# Patient Record
Sex: MALE | Race: BLACK OR AFRICAN AMERICAN | NOT HISPANIC OR LATINO
[De-identification: names, ages, dates, MRNs, and addresses within clinical notes are randomized per-mention and may not be internally consistent; named-entity substitution may affect disease eponyms.]

---

## 2017-07-14 ENCOUNTER — FORM ENCOUNTER (OUTPATIENT)
Age: 82
End: 2017-07-14

## 2017-07-15 ENCOUNTER — OUTPATIENT (OUTPATIENT)
Dept: OUTPATIENT SERVICES | Facility: HOSPITAL | Age: 82
LOS: 1 days | End: 2017-07-15
Payer: MEDICARE

## 2017-07-15 PROCEDURE — 70544 MR ANGIOGRAPHY HEAD W/O DYE: CPT

## 2017-07-15 PROCEDURE — 70544 MR ANGIOGRAPHY HEAD W/O DYE: CPT | Mod: 26

## 2018-10-07 ENCOUNTER — FORM ENCOUNTER (OUTPATIENT)
Age: 83
End: 2018-10-07

## 2018-10-08 ENCOUNTER — APPOINTMENT (OUTPATIENT)
Dept: MRI IMAGING | Facility: HOSPITAL | Age: 83
End: 2018-10-08
Payer: MEDICARE

## 2018-10-08 ENCOUNTER — OUTPATIENT (OUTPATIENT)
Dept: OUTPATIENT SERVICES | Facility: HOSPITAL | Age: 83
LOS: 1 days | End: 2018-10-08
Payer: MEDICARE

## 2018-10-08 PROCEDURE — 70544 MR ANGIOGRAPHY HEAD W/O DYE: CPT | Mod: 26

## 2018-10-08 PROCEDURE — 70544 MR ANGIOGRAPHY HEAD W/O DYE: CPT

## 2019-10-14 ENCOUNTER — FORM ENCOUNTER (OUTPATIENT)
Age: 84
End: 2019-10-14

## 2019-10-15 ENCOUNTER — APPOINTMENT (OUTPATIENT)
Dept: MRI IMAGING | Facility: HOSPITAL | Age: 84
End: 2019-10-15
Payer: MEDICARE

## 2019-10-15 ENCOUNTER — OUTPATIENT (OUTPATIENT)
Dept: OUTPATIENT SERVICES | Facility: HOSPITAL | Age: 84
LOS: 1 days | End: 2019-10-15
Payer: MEDICARE

## 2019-10-15 PROCEDURE — A9579: CPT

## 2019-10-15 PROCEDURE — 70546 MR ANGIOGRAPH HEAD W/O&W/DYE: CPT | Mod: 26

## 2019-10-15 PROCEDURE — A9585: CPT

## 2019-10-15 PROCEDURE — 70546 MR ANGIOGRAPH HEAD W/O&W/DYE: CPT

## 2020-05-05 ENCOUNTER — TRANSCRIPTION ENCOUNTER (OUTPATIENT)
Age: 85
End: 2020-05-05

## 2020-09-28 ENCOUNTER — APPOINTMENT (OUTPATIENT)
Dept: MRI IMAGING | Facility: HOSPITAL | Age: 85
End: 2020-09-28
Payer: MEDICARE

## 2020-09-28 ENCOUNTER — OUTPATIENT (OUTPATIENT)
Dept: OUTPATIENT SERVICES | Facility: HOSPITAL | Age: 85
LOS: 1 days | End: 2020-09-28
Payer: MEDICARE

## 2020-09-28 PROCEDURE — 70551 MRI BRAIN STEM W/O DYE: CPT

## 2020-09-28 PROCEDURE — 70551 MRI BRAIN STEM W/O DYE: CPT | Mod: 26

## 2020-10-15 DIAGNOSIS — D35.2 BENIGN NEOPLASM OF PITUITARY GLAND: ICD-10-CM

## 2020-10-27 ENCOUNTER — APPOINTMENT (OUTPATIENT)
Dept: ENDOCRINOLOGY | Facility: CLINIC | Age: 85
End: 2020-10-27
Payer: MEDICARE

## 2020-10-27 VITALS
HEIGHT: 70 IN | SYSTOLIC BLOOD PRESSURE: 192 MMHG | HEART RATE: 72 BPM | BODY MASS INDEX: 28.77 KG/M2 | WEIGHT: 201 LBS | DIASTOLIC BLOOD PRESSURE: 91 MMHG

## 2020-10-27 PROCEDURE — 99203 OFFICE O/P NEW LOW 30 MIN: CPT | Mod: 25

## 2020-10-28 RX ORDER — LOSARTAN POTASSIUM 100 MG/1
100 TABLET, FILM COATED ORAL
Qty: 90 | Refills: 0 | Status: ACTIVE | COMMUNITY
Start: 2020-09-22
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
## 2020-10-28 NOTE — REASON FOR VISIT
[Initial Evaluation] : an initial evaluation [Pituitary Evaluation/ Disorder] : pituitary evaluation/disorder [FreeTextEntry2] : Dr Alfaro

## 2020-10-28 NOTE — ASSESSMENT
[FreeTextEntry1] : Possible pituitary adenoma seen on MRI\par will check pituitary hormones.  symptomatically may have low testosterone but given his age would likely not replace testosterone unless it is very low (< 200).  Though if low testosterone is due to hyperprolactinemia, it will likely normalize with dopamine agonist.  Since timing of symptoms is coincident with bleeding episode, low T could be stress related but then also should recover once stress has resolved.\par If hormones are normal, then no further treatment or workup necessary.\par

## 2020-10-28 NOTE — PHYSICAL EXAM
[Alert] : alert [Healthy Appearance] : healthy appearance [No Proptosis] : no proptosis [No Lid Lag] : no lid lag [Normal Hearing] : hearing was normal [No LAD] : no lymphadenopathy [Thyroid Not Enlarged] : the thyroid was not enlarged [Clear to Auscultation] : lungs were clear to auscultation bilaterally [Normal S1, S2] : normal S1 and S2 [Regular Rhythm] : with a regular rhythm [Normal Affect] : the affect was normal [Normal Mood] : the mood was normal [Acanthosis Nigricans] : no acanthosis nigricans [de-identified] : looks younger than stated age [de-identified] : no acromegaly or Cushings features

## 2020-10-28 NOTE — DATA REVIEWED
[FreeTextEntry1] : MRI, 9/20:\par approx 3mm focus of hyperintensity medial for R cav segment of ICA -- may represent thrombosed aneurysm or R sided 3mm pit adenoma\par mild microvascular disease \par 3.5mm outpouching of supraclinoid L ICA No

## 2020-10-30 LAB
CORTIS SERPL-MCNC: 10 UG/DL
FSH SERPL-MCNC: 44.5 IU/L
LH SERPL-ACNC: 21.3 IU/L
PROLACTIN SERPL-MCNC: 14.7 NG/ML
T4 FREE SERPL-MCNC: 1.2 NG/DL
T4 SERPL-MCNC: 7 UG/DL
TESTOST SERPL-MCNC: 332 NG/DL
TSH SERPL-ACNC: 1.9 UIU/ML

## 2020-11-04 LAB
ALBUMIN SERPL ELPH-MCNC: 4.2 G/DL
ALP BLD-CCNC: 59 U/L
ALT SERPL-CCNC: 20 U/L
ANION GAP SERPL CALC-SCNC: 11 MMOL/L
AST SERPL-CCNC: 22 U/L
BILIRUB SERPL-MCNC: 0.7 MG/DL
BUN SERPL-MCNC: 21 MG/DL
CALCIUM SERPL-MCNC: 9.6 MG/DL
CHLORIDE SERPL-SCNC: 108 MMOL/L
CO2 SERPL-SCNC: 26 MMOL/L
CREAT SERPL-MCNC: 1.33 MG/DL
GLUCOSE SERPL-MCNC: 96 MG/DL
IGF-1 INTERP: NORMAL
IGF-I BLD-MCNC: 68 NG/ML
POTASSIUM SERPL-SCNC: 4.2 MMOL/L
PROT SERPL-MCNC: 7.2 G/DL
SODIUM SERPL-SCNC: 145 MMOL/L

## 2021-09-28 ENCOUNTER — OUTPATIENT (OUTPATIENT)
Dept: OUTPATIENT SERVICES | Facility: HOSPITAL | Age: 86
LOS: 1 days | End: 2021-09-28
Payer: MEDICARE

## 2021-09-28 ENCOUNTER — APPOINTMENT (OUTPATIENT)
Dept: MRI IMAGING | Facility: HOSPITAL | Age: 86
End: 2021-09-28
Payer: MEDICARE

## 2021-09-28 PROCEDURE — 70544 MR ANGIOGRAPHY HEAD W/O DYE: CPT | Mod: 26,MH

## 2021-09-28 PROCEDURE — 70544 MR ANGIOGRAPHY HEAD W/O DYE: CPT

## 2021-10-14 ENCOUNTER — TRANSCRIPTION ENCOUNTER (OUTPATIENT)
Age: 86
End: 2021-10-14

## 2021-10-15 ENCOUNTER — TRANSCRIPTION ENCOUNTER (OUTPATIENT)
Age: 86
End: 2021-10-15

## 2021-10-24 ENCOUNTER — NON-APPOINTMENT (OUTPATIENT)
Age: 86
End: 2021-10-24

## 2021-10-28 ENCOUNTER — APPOINTMENT (OUTPATIENT)
Dept: ENDOCRINOLOGY | Facility: CLINIC | Age: 86
End: 2021-10-28
Payer: MEDICARE

## 2021-10-28 VITALS
WEIGHT: 201 LBS | HEIGHT: 70 IN | DIASTOLIC BLOOD PRESSURE: 65 MMHG | HEART RATE: 66 BPM | SYSTOLIC BLOOD PRESSURE: 153 MMHG | BODY MASS INDEX: 28.77 KG/M2

## 2021-10-28 PROCEDURE — 99213 OFFICE O/P EST LOW 20 MIN: CPT

## 2021-10-28 NOTE — HISTORY OF PRESENT ILLNESS
[FreeTextEntry1] : no health issues since last visit\par energy is good.  appetite is reduced but he is eating\par no nausea, dizziness\par no falls or problems with balance\par walks dog twice/day, 30-45 minutes\par no headache or vision changes\par weight is the same as last year\par cleans own house, lives with wife of 66 years\par sleep is ok, but interrupted.\par getting injections from primary every 6 weeks ( ? testosterone).  Says PCP is checking labs also; labs done last week, has follow up next week to go over results.\par got flu vaccine, waiting for Moderna booster\par MRI reports from 9/21 reviewed: stable 3mm pit adenoma\par \par Meds:\par metformin 500mg/day for pre diabetes \par amlodipine 10mg, atenolol 50mg, losartan 100mg\par folic acid, iron\par Cialis 5mg for BPH

## 2021-10-28 NOTE — ASSESSMENT
[FreeTextEntry1] : PItuitary adenoma, non functional\par Hypogonadism, primary (high LH, FSH).  on T replacement.\par PT can follow with PCP and return to me if labs become abnormal, or if new symptoms develop (headache, vision changes, significant weight changes, uncontrolled BP or glucose)\par RTO prn

## 2021-10-28 NOTE — DATA REVIEWED
[FreeTextEntry1] : 10/20: IGF1 68, prolactin 14.7, tot T 332, LH 21.3, FSH 44.5, pm cortisol 10, free T4 1.2, TSH 1.90\par \par \par MRI, 9/21: \par 3mm rounded focus on R side of pituitary c/w cystic or proteinaceous pit adenoma\par \par MRI, 9/20:\par approx 3mm focus of hyperintensity medial for R cav segment of ICA -- may represent thrombosed aneurysm or R sided 3mm pit adenoma\par mild microvascular disease \par 3.5mm outpouching of supraclinoid L ICA

## 2021-10-28 NOTE — PHYSICAL EXAM
[Alert] : alert [Healthy Appearance] : healthy appearance [No Proptosis] : no proptosis [No Lid Lag] : no lid lag [Normal Hearing] : hearing was normal [No LAD] : no lymphadenopathy [Thyroid Not Enlarged] : the thyroid was not enlarged [Clear to Auscultation] : lungs were clear to auscultation bilaterally [Normal S1, S2] : normal S1 and S2 [Regular Rhythm] : with a regular rhythm [Acanthosis Nigricans] : no acanthosis nigricans [Normal Affect] : the affect was normal [Normal Mood] : the mood was normal [de-identified] : looks younger than stated age [de-identified] : no acromegaly or Cushings features

## 2022-01-01 ENCOUNTER — OUTPATIENT (OUTPATIENT)
Dept: OUTPATIENT SERVICES | Facility: HOSPITAL | Age: 87
LOS: 1 days | End: 2022-01-01
Payer: MEDICARE

## 2022-01-01 ENCOUNTER — APPOINTMENT (OUTPATIENT)
Dept: MRI IMAGING | Facility: HOSPITAL | Age: 87
End: 2022-01-01

## 2022-01-01 PROCEDURE — 70544 MR ANGIOGRAPHY HEAD W/O DYE: CPT

## 2022-01-01 PROCEDURE — 70544 MR ANGIOGRAPHY HEAD W/O DYE: CPT | Mod: 26,MH

## 2023-01-01 ENCOUNTER — NON-APPOINTMENT (OUTPATIENT)
Age: 88
End: 2023-01-01

## 2023-01-01 ENCOUNTER — APPOINTMENT (OUTPATIENT)
Dept: OTOLARYNGOLOGY | Facility: CLINIC | Age: 88
End: 2023-01-01

## 2023-01-01 ENCOUNTER — RESULT REVIEW (OUTPATIENT)
Age: 88
End: 2023-01-01

## 2023-01-01 ENCOUNTER — APPOINTMENT (OUTPATIENT)
Dept: HEMATOLOGY ONCOLOGY | Facility: CLINIC | Age: 88
End: 2023-01-01

## 2023-01-01 ENCOUNTER — TRANSCRIPTION ENCOUNTER (OUTPATIENT)
Age: 88
End: 2023-01-01

## 2023-01-01 ENCOUNTER — APPOINTMENT (OUTPATIENT)
Dept: INTERVENTIONAL RADIOLOGY/VASCULAR | Facility: HOSPITAL | Age: 88
End: 2023-01-01

## 2023-01-01 ENCOUNTER — OUTPATIENT (OUTPATIENT)
Dept: OUTPATIENT SERVICES | Facility: HOSPITAL | Age: 88
LOS: 1 days | End: 2023-01-01
Payer: MEDICARE

## 2023-01-01 ENCOUNTER — INPATIENT (INPATIENT)
Facility: HOSPITAL | Age: 88
LOS: 0 days | DRG: 951 | End: 2023-09-01
Attending: GENERAL ACUTE CARE HOSPITAL | Admitting: INTERNAL MEDICINE
Payer: MEDICARE

## 2023-01-01 ENCOUNTER — APPOINTMENT (OUTPATIENT)
Dept: HEMATOLOGY ONCOLOGY | Facility: CLINIC | Age: 88
End: 2023-01-01
Payer: MEDICARE

## 2023-01-01 ENCOUNTER — APPOINTMENT (OUTPATIENT)
Dept: OTOLARYNGOLOGY | Facility: CLINIC | Age: 88
End: 2023-01-01
Payer: MEDICARE

## 2023-01-01 ENCOUNTER — APPOINTMENT (OUTPATIENT)
Dept: RADIATION ONCOLOGY | Facility: CLINIC | Age: 88
End: 2023-01-01

## 2023-01-01 ENCOUNTER — APPOINTMENT (OUTPATIENT)
Dept: CT IMAGING | Facility: HOSPITAL | Age: 88
End: 2023-01-01

## 2023-01-01 ENCOUNTER — INPATIENT (INPATIENT)
Facility: HOSPITAL | Age: 88
LOS: 7 days | Discharge: HOSPICE | DRG: 326 | End: 2023-08-25
Attending: GENERAL ACUTE CARE HOSPITAL | Admitting: INTERNAL MEDICINE
Payer: MEDICARE

## 2023-01-01 ENCOUNTER — EMERGENCY (EMERGENCY)
Facility: HOSPITAL | Age: 88
LOS: 1 days | Discharge: HOME | End: 2023-01-01
Attending: EMERGENCY MEDICINE | Admitting: EMERGENCY MEDICINE
Payer: MEDICARE

## 2023-01-01 VITALS
HEART RATE: 69 BPM | SYSTOLIC BLOOD PRESSURE: 178 MMHG | DIASTOLIC BLOOD PRESSURE: 77 MMHG | RESPIRATION RATE: 18 BRPM | WEIGHT: 169.98 LBS

## 2023-01-01 VITALS
HEIGHT: 70 IN | TEMPERATURE: 98.4 F | WEIGHT: 185 LBS | OXYGEN SATURATION: 99 % | BODY MASS INDEX: 26.48 KG/M2 | DIASTOLIC BLOOD PRESSURE: 74 MMHG | HEART RATE: 61 BPM | SYSTOLIC BLOOD PRESSURE: 158 MMHG

## 2023-01-01 VITALS
DIASTOLIC BLOOD PRESSURE: 66 MMHG | HEART RATE: 71 BPM | BODY MASS INDEX: 24.77 KG/M2 | SYSTOLIC BLOOD PRESSURE: 181 MMHG | TEMPERATURE: 98 F | HEIGHT: 70 IN | OXYGEN SATURATION: 97 % | WEIGHT: 173 LBS | RESPIRATION RATE: 16 BRPM

## 2023-01-01 VITALS
WEIGHT: 173.94 LBS | TEMPERATURE: 98 F | HEIGHT: 66 IN | RESPIRATION RATE: 27 BRPM | HEART RATE: 81 BPM | DIASTOLIC BLOOD PRESSURE: 80 MMHG | OXYGEN SATURATION: 90 % | SYSTOLIC BLOOD PRESSURE: 173 MMHG

## 2023-01-01 VITALS
HEART RATE: 67 BPM | RESPIRATION RATE: 18 BRPM | OXYGEN SATURATION: 97 % | SYSTOLIC BLOOD PRESSURE: 170 MMHG | TEMPERATURE: 98 F | DIASTOLIC BLOOD PRESSURE: 80 MMHG

## 2023-01-01 VITALS
DIASTOLIC BLOOD PRESSURE: 73 MMHG | SYSTOLIC BLOOD PRESSURE: 163 MMHG | HEART RATE: 70 BPM | RESPIRATION RATE: 18 BRPM | OXYGEN SATURATION: 97 % | TEMPERATURE: 98 F

## 2023-01-01 VITALS
OXYGEN SATURATION: 96 % | HEIGHT: 66 IN | HEART RATE: 80 BPM | DIASTOLIC BLOOD PRESSURE: 94 MMHG | RESPIRATION RATE: 18 BRPM | SYSTOLIC BLOOD PRESSURE: 138 MMHG

## 2023-01-01 VITALS — RESPIRATION RATE: 18 BRPM

## 2023-01-01 DIAGNOSIS — Z86.79 PERSONAL HISTORY OF OTHER DISEASES OF THE CIRCULATORY SYSTEM: ICD-10-CM

## 2023-01-01 DIAGNOSIS — J96.90 RESPIRATORY FAILURE, UNSPECIFIED, UNSPECIFIED WHETHER WITH HYPOXIA OR HYPERCAPNIA: ICD-10-CM

## 2023-01-01 DIAGNOSIS — I10 ESSENTIAL (PRIMARY) HYPERTENSION: ICD-10-CM

## 2023-01-01 DIAGNOSIS — D63.0 ANEMIA IN NEOPLASTIC DISEASE: ICD-10-CM

## 2023-01-01 DIAGNOSIS — D13.0 BENIGN NEOPLASM OF ESOPHAGUS: ICD-10-CM

## 2023-01-01 DIAGNOSIS — R59.1 GENERALIZED ENLARGED LYMPH NODES: ICD-10-CM

## 2023-01-01 DIAGNOSIS — K22.89 OTHER SPECIFIED DISEASE OF ESOPHAGUS: ICD-10-CM

## 2023-01-01 DIAGNOSIS — C15.9 MALIGNANT NEOPLASM OF ESOPHAGUS, UNSPECIFIED: ICD-10-CM

## 2023-01-01 DIAGNOSIS — E87.0 HYPEROSMOLALITY AND HYPERNATREMIA: ICD-10-CM

## 2023-01-01 DIAGNOSIS — D50.9 IRON DEFICIENCY ANEMIA, UNSPECIFIED: ICD-10-CM

## 2023-01-01 DIAGNOSIS — J96.02 ACUTE RESPIRATORY FAILURE WITH HYPERCAPNIA: ICD-10-CM

## 2023-01-01 DIAGNOSIS — R63.8 OTHER SYMPTOMS AND SIGNS CONCERNING FOOD AND FLUID INTAKE: ICD-10-CM

## 2023-01-01 DIAGNOSIS — R06.02 SHORTNESS OF BREATH: ICD-10-CM

## 2023-01-01 DIAGNOSIS — T50.8X5A ADVERSE EFFECT OF DIAGNOSTIC AGENTS, INITIAL ENCOUNTER: ICD-10-CM

## 2023-01-01 DIAGNOSIS — H91.93 UNSPECIFIED HEARING LOSS, BILATERAL: ICD-10-CM

## 2023-01-01 DIAGNOSIS — J38.01 PARALYSIS OF VOCAL CORDS AND LARYNX, UNILATERAL: ICD-10-CM

## 2023-01-01 DIAGNOSIS — Z51.5 ENCOUNTER FOR PALLIATIVE CARE: ICD-10-CM

## 2023-01-01 DIAGNOSIS — R53.2 FUNCTIONAL QUADRIPLEGIA: ICD-10-CM

## 2023-01-01 DIAGNOSIS — J38.00 PARALYSIS OF VOCAL CORDS AND LARYNX, UNSPECIFIED: ICD-10-CM

## 2023-01-01 DIAGNOSIS — C77.1 SECONDARY AND UNSPECIFIED MALIGNANT NEOPLASM OF INTRATHORACIC LYMPH NODES: ICD-10-CM

## 2023-01-01 DIAGNOSIS — N17.9 ACUTE KIDNEY FAILURE, UNSPECIFIED: ICD-10-CM

## 2023-01-01 DIAGNOSIS — E27.9 DISORDER OF ADRENAL GLAND, UNSPECIFIED: ICD-10-CM

## 2023-01-01 DIAGNOSIS — Z87.891 PERSONAL HISTORY OF NICOTINE DEPENDENCE: ICD-10-CM

## 2023-01-01 DIAGNOSIS — Z66 DO NOT RESUSCITATE: ICD-10-CM

## 2023-01-01 DIAGNOSIS — R53.81 OTHER MALAISE: ICD-10-CM

## 2023-01-01 DIAGNOSIS — J96.01 ACUTE RESPIRATORY FAILURE WITH HYPOXIA: ICD-10-CM

## 2023-01-01 DIAGNOSIS — J15.6 PNEUMONIA DUE TO OTHER GRAM-NEGATIVE BACTERIA: ICD-10-CM

## 2023-01-01 DIAGNOSIS — R49.8 OTHER VOICE AND RESONANCE DISORDERS: ICD-10-CM

## 2023-01-01 DIAGNOSIS — E87.20 ACIDOSIS, UNSPECIFIED: ICD-10-CM

## 2023-01-01 DIAGNOSIS — R63.4 ABNORMAL WEIGHT LOSS: ICD-10-CM

## 2023-01-01 DIAGNOSIS — D64.9 ANEMIA, UNSPECIFIED: ICD-10-CM

## 2023-01-01 DIAGNOSIS — J39.8 OTHER SPECIFIED DISEASES OF UPPER RESPIRATORY TRACT: ICD-10-CM

## 2023-01-01 DIAGNOSIS — Z71.89 OTHER SPECIFIED COUNSELING: ICD-10-CM

## 2023-01-01 DIAGNOSIS — Z87.39 PERSONAL HISTORY OF OTHER DISEASES OF THE MUSCULOSKELETAL SYSTEM AND CONNECTIVE TISSUE: ICD-10-CM
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
## 2023-01-01 DIAGNOSIS — K29.70 GASTRITIS, UNSPECIFIED, WITHOUT BLEEDING: ICD-10-CM

## 2023-01-01 DIAGNOSIS — R13.19 OTHER DYSPHAGIA: ICD-10-CM

## 2023-01-01 DIAGNOSIS — M19.90 UNSPECIFIED OSTEOARTHRITIS, UNSPECIFIED SITE: ICD-10-CM

## 2023-01-01 DIAGNOSIS — Z79.82 LONG TERM (CURRENT) USE OF ASPIRIN: ICD-10-CM

## 2023-01-01 DIAGNOSIS — R59.0 LOCALIZED ENLARGED LYMPH NODES: ICD-10-CM

## 2023-01-01 DIAGNOSIS — K22.2 ESOPHAGEAL OBSTRUCTION: ICD-10-CM

## 2023-01-01 DIAGNOSIS — J69.0 PNEUMONITIS DUE TO INHALATION OF FOOD AND VOMIT: ICD-10-CM

## 2023-01-01 DIAGNOSIS — T17.928A FOOD IN RESPIRATORY TRACT, PART UNSPECIFIED CAUSING OTHER INJURY, INITIAL ENCOUNTER: ICD-10-CM

## 2023-01-01 DIAGNOSIS — Z86.19 PERSONAL HISTORY OF OTHER INFECTIOUS AND PARASITIC DISEASES: ICD-10-CM

## 2023-01-01 DIAGNOSIS — R65.20 SEVERE SEPSIS WITHOUT SEPTIC SHOCK: ICD-10-CM

## 2023-01-01 DIAGNOSIS — G93.40 ENCEPHALOPATHY, UNSPECIFIED: ICD-10-CM

## 2023-01-01 DIAGNOSIS — N14.11 CONTRAST-INDUCED NEPHROPATHY: ICD-10-CM

## 2023-01-01 DIAGNOSIS — E43 UNSPECIFIED SEVERE PROTEIN-CALORIE MALNUTRITION: ICD-10-CM

## 2023-01-01 DIAGNOSIS — J18.9 PNEUMONIA, UNSPECIFIED ORGANISM: ICD-10-CM

## 2023-01-01 DIAGNOSIS — I44.7 LEFT BUNDLE-BRANCH BLOCK, UNSPECIFIED: ICD-10-CM

## 2023-01-01 DIAGNOSIS — Z91.148 PATIENT'S OTHER NONCOMPLIANCE WITH MEDICATION REGIMEN FOR OTHER REASON: ICD-10-CM

## 2023-01-01 DIAGNOSIS — K08.109 COMPLETE LOSS OF TEETH, UNSPECIFIED CAUSE, UNSPECIFIED CLASS: ICD-10-CM

## 2023-01-01 DIAGNOSIS — R49.0 DYSPHONIA: ICD-10-CM

## 2023-01-01 DIAGNOSIS — A41.9 SEPSIS, UNSPECIFIED ORGANISM: ICD-10-CM

## 2023-01-01 DIAGNOSIS — J38.4 EDEMA OF LARYNX: ICD-10-CM

## 2023-01-01 DIAGNOSIS — T46.5X6A UNDERDOSING OF OTHER ANTIHYPERTENSIVE DRUGS, INITIAL ENCOUNTER: ICD-10-CM

## 2023-01-01 DIAGNOSIS — K29.80 DUODENITIS WITHOUT BLEEDING: ICD-10-CM

## 2023-01-01 DIAGNOSIS — R13.14 DYSPHAGIA, PHARYNGOESOPHAGEAL PHASE: ICD-10-CM

## 2023-01-01 DIAGNOSIS — D49.0 NEOPLASM OF UNSPECIFIED BEHAVIOR OF DIGESTIVE SYSTEM: ICD-10-CM

## 2023-01-01 DIAGNOSIS — R13.10 DYSPHAGIA, UNSPECIFIED: ICD-10-CM

## 2023-01-01 DIAGNOSIS — R59.9 ENLARGED LYMPH NODES, UNSPECIFIED: ICD-10-CM

## 2023-01-01 DIAGNOSIS — R06.1 STRIDOR: ICD-10-CM

## 2023-01-01 LAB
-  AMPICILLIN/SULBACTAM: SIGNIFICANT CHANGE UP
-  CEFEPIME: SIGNIFICANT CHANGE UP
-  CEFTRIAXONE: SIGNIFICANT CHANGE UP
-  CIPROFLOXACIN: SIGNIFICANT CHANGE UP
-  GENTAMICIN: SIGNIFICANT CHANGE UP
-  TOBRAMYCIN: SIGNIFICANT CHANGE UP
-  TRIMETHOPRIM/SULFAMETHOXAZOLE: SIGNIFICANT CHANGE UP
ALBUMIN SERPL ELPH-MCNC: 3.1 G/DL — LOW (ref 3.3–5)
ALBUMIN SERPL ELPH-MCNC: 3.3 G/DL — SIGNIFICANT CHANGE UP (ref 3.3–5)
ALBUMIN SERPL ELPH-MCNC: 3.9 G/DL — SIGNIFICANT CHANGE UP (ref 3.3–5)
ALBUMIN SERPL ELPH-MCNC: 4.1 G/DL
ALP BLD-CCNC: 57 U/L
ALP SERPL-CCNC: 46 U/L — SIGNIFICANT CHANGE UP (ref 40–120)
ALP SERPL-CCNC: 47 U/L — SIGNIFICANT CHANGE UP (ref 40–120)
ALP SERPL-CCNC: 57 U/L — SIGNIFICANT CHANGE UP (ref 40–120)
ALT FLD-CCNC: 10 U/L — SIGNIFICANT CHANGE UP (ref 10–45)
ALT FLD-CCNC: 14 U/L — SIGNIFICANT CHANGE UP (ref 10–45)
ALT FLD-CCNC: 15 U/L — SIGNIFICANT CHANGE UP (ref 10–45)
ALT SERPL-CCNC: 14 U/L
ANION GAP SERPL CALC-SCNC: 11 MMOL/L — SIGNIFICANT CHANGE UP (ref 5–17)
ANION GAP SERPL CALC-SCNC: 12 MMOL/L — SIGNIFICANT CHANGE UP (ref 5–17)
ANION GAP SERPL CALC-SCNC: 13 MMOL/L — SIGNIFICANT CHANGE UP (ref 5–17)
ANION GAP SERPL CALC-SCNC: 5 MMOL/L — SIGNIFICANT CHANGE UP (ref 5–17)
ANION GAP SERPL CALC-SCNC: 7 MMOL/L — SIGNIFICANT CHANGE UP (ref 5–17)
ANION GAP SERPL CALC-SCNC: 7 MMOL/L — SIGNIFICANT CHANGE UP (ref 5–17)
ANION GAP SERPL CALC-SCNC: 8 MMOL/L
ANION GAP SERPL CALC-SCNC: 8 MMOL/L — SIGNIFICANT CHANGE UP (ref 5–17)
ANISOCYTOSIS BLD QL: SIGNIFICANT CHANGE UP
ANISOCYTOSIS BLD QL: SLIGHT — SIGNIFICANT CHANGE UP
APPEARANCE UR: CLEAR — SIGNIFICANT CHANGE UP
APTT BLD: 24.9 SEC — SIGNIFICANT CHANGE UP (ref 24.5–35.6)
APTT BLD: 27.6 SEC
APTT BLD: 30.5 SEC — SIGNIFICANT CHANGE UP (ref 24.5–35.6)
AST SERPL-CCNC: 16 U/L — SIGNIFICANT CHANGE UP (ref 10–40)
AST SERPL-CCNC: 19 U/L — SIGNIFICANT CHANGE UP (ref 10–40)
AST SERPL-CCNC: 21 U/L
AST SERPL-CCNC: 28 U/L — SIGNIFICANT CHANGE UP (ref 10–40)
BASE EXCESS BLDA CALC-SCNC: -1.7 MMOL/L — SIGNIFICANT CHANGE UP (ref -2–3)
BASE EXCESS BLDA CALC-SCNC: -2.4 MMOL/L — LOW (ref -2–3)
BASE EXCESS BLDA CALC-SCNC: 0.8 MMOL/L — SIGNIFICANT CHANGE UP (ref -2–3)
BASE EXCESS BLDV CALC-SCNC: -1.5 MMOL/L — SIGNIFICANT CHANGE UP (ref -2–3)
BASE EXCESS BLDV CALC-SCNC: -1.9 MMOL/L — SIGNIFICANT CHANGE UP (ref -2–3)
BASE EXCESS BLDV CALC-SCNC: 1.2 MMOL/L — SIGNIFICANT CHANGE UP (ref -2–3)
BASOPHILS # BLD AUTO: 0 K/UL — SIGNIFICANT CHANGE UP (ref 0–0.2)
BASOPHILS # BLD AUTO: 0.01 K/UL — SIGNIFICANT CHANGE UP (ref 0–0.2)
BASOPHILS # BLD AUTO: 0.03 K/UL — SIGNIFICANT CHANGE UP (ref 0–0.2)
BASOPHILS NFR BLD AUTO: 0 % — SIGNIFICANT CHANGE UP (ref 0–2)
BASOPHILS NFR BLD AUTO: 0.2 % — SIGNIFICANT CHANGE UP (ref 0–2)
BASOPHILS NFR BLD AUTO: 0.5 % — SIGNIFICANT CHANGE UP (ref 0–2)
BILIRUB SERPL-MCNC: 0.3 MG/DL — SIGNIFICANT CHANGE UP (ref 0.2–1.2)
BILIRUB SERPL-MCNC: 0.6 MG/DL — SIGNIFICANT CHANGE UP (ref 0.2–1.2)
BILIRUB SERPL-MCNC: 0.7 MG/DL
BILIRUB SERPL-MCNC: 0.7 MG/DL — SIGNIFICANT CHANGE UP (ref 0.2–1.2)
BILIRUB UR-MCNC: NEGATIVE — SIGNIFICANT CHANGE UP
BLD GP AB SCN SERPL QL: NEGATIVE — SIGNIFICANT CHANGE UP
BLD GP AB SCN SERPL QL: NEGATIVE — SIGNIFICANT CHANGE UP
BUN SERPL-MCNC: 12 MG/DL — SIGNIFICANT CHANGE UP (ref 7–23)
BUN SERPL-MCNC: 14 MG/DL — SIGNIFICANT CHANGE UP (ref 7–23)
BUN SERPL-MCNC: 17 MG/DL
BUN SERPL-MCNC: 23 MG/DL — SIGNIFICANT CHANGE UP (ref 7–23)
BUN SERPL-MCNC: 26 MG/DL — HIGH (ref 7–23)
BUN SERPL-MCNC: 30 MG/DL — HIGH (ref 7–23)
BUN SERPL-MCNC: 35 MG/DL — HIGH (ref 7–23)
BUN SERPL-MCNC: 36 MG/DL — HIGH (ref 7–23)
CA-I SERPL-SCNC: 1.19 MMOL/L — SIGNIFICANT CHANGE UP (ref 1.15–1.33)
CA-I SERPL-SCNC: 1.21 MMOL/L — SIGNIFICANT CHANGE UP (ref 1.15–1.33)
CALCIUM SERPL-MCNC: 8.1 MG/DL — LOW (ref 8.4–10.5)
CALCIUM SERPL-MCNC: 8.7 MG/DL — SIGNIFICANT CHANGE UP (ref 8.4–10.5)
CALCIUM SERPL-MCNC: 8.8 MG/DL — SIGNIFICANT CHANGE UP (ref 8.4–10.5)
CALCIUM SERPL-MCNC: 8.8 MG/DL — SIGNIFICANT CHANGE UP (ref 8.4–10.5)
CALCIUM SERPL-MCNC: 8.9 MG/DL — SIGNIFICANT CHANGE UP (ref 8.4–10.5)
CALCIUM SERPL-MCNC: 9 MG/DL — SIGNIFICANT CHANGE UP (ref 8.4–10.5)
CALCIUM SERPL-MCNC: 9.1 MG/DL — SIGNIFICANT CHANGE UP (ref 8.4–10.5)
CALCIUM SERPL-MCNC: 9.4 MG/DL
CHLORIDE SERPL-SCNC: 101 MMOL/L — SIGNIFICANT CHANGE UP (ref 96–108)
CHLORIDE SERPL-SCNC: 104 MMOL/L — SIGNIFICANT CHANGE UP (ref 96–108)
CHLORIDE SERPL-SCNC: 107 MMOL/L
CHLORIDE SERPL-SCNC: 107 MMOL/L — SIGNIFICANT CHANGE UP (ref 96–108)
CHLORIDE SERPL-SCNC: 107 MMOL/L — SIGNIFICANT CHANGE UP (ref 96–108)
CHLORIDE SERPL-SCNC: 108 MMOL/L — SIGNIFICANT CHANGE UP (ref 96–108)
CHLORIDE SERPL-SCNC: 108 MMOL/L — SIGNIFICANT CHANGE UP (ref 96–108)
CHLORIDE SERPL-SCNC: 112 MMOL/L — HIGH (ref 96–108)
CO2 BLDA-SCNC: 23 MMOL/L — SIGNIFICANT CHANGE UP (ref 19–24)
CO2 BLDA-SCNC: 25 MMOL/L — HIGH (ref 19–24)
CO2 BLDA-SCNC: 25 MMOL/L — HIGH (ref 19–24)
CO2 BLDV-SCNC: 31.2 MMOL/L — HIGH (ref 22–26)
CO2 BLDV-SCNC: 31.7 MMOL/L — HIGH (ref 22–26)
CO2 BLDV-SCNC: 34.3 MMOL/L — HIGH (ref 22–26)
CO2 SERPL-SCNC: 24 MMOL/L — SIGNIFICANT CHANGE UP (ref 22–31)
CO2 SERPL-SCNC: 26 MMOL/L — SIGNIFICANT CHANGE UP (ref 22–31)
CO2 SERPL-SCNC: 27 MMOL/L — SIGNIFICANT CHANGE UP (ref 22–31)
CO2 SERPL-SCNC: 27 MMOL/L — SIGNIFICANT CHANGE UP (ref 22–31)
CO2 SERPL-SCNC: 28 MMOL/L — SIGNIFICANT CHANGE UP (ref 22–31)
CO2 SERPL-SCNC: 29 MMOL/L
CO2 SERPL-SCNC: 29 MMOL/L — SIGNIFICANT CHANGE UP (ref 22–31)
CO2 SERPL-SCNC: 30 MMOL/L — SIGNIFICANT CHANGE UP (ref 22–31)
COLOR SPEC: YELLOW — SIGNIFICANT CHANGE UP
CREAT ?TM UR-MCNC: 184 MG/DL — SIGNIFICANT CHANGE UP
CREAT SERPL-MCNC: 1.06 MG/DL — SIGNIFICANT CHANGE UP (ref 0.5–1.3)
CREAT SERPL-MCNC: 1.09 MG/DL — SIGNIFICANT CHANGE UP (ref 0.5–1.3)
CREAT SERPL-MCNC: 1.21 MG/DL — SIGNIFICANT CHANGE UP (ref 0.5–1.3)
CREAT SERPL-MCNC: 1.23 MG/DL
CREAT SERPL-MCNC: 1.29 MG/DL — SIGNIFICANT CHANGE UP (ref 0.5–1.3)
CREAT SERPL-MCNC: 1.31 MG/DL — HIGH (ref 0.5–1.3)
CREAT SERPL-MCNC: 1.51 MG/DL — HIGH (ref 0.5–1.3)
CREAT SERPL-MCNC: 1.81 MG/DL — HIGH (ref 0.5–1.3)
CULTURE RESULTS: SIGNIFICANT CHANGE UP
DACRYOCYTES BLD QL SMEAR: SLIGHT — SIGNIFICANT CHANGE UP
DIFF PNL FLD: NEGATIVE — SIGNIFICANT CHANGE UP
EGFR: 35 ML/MIN/1.73M2 — LOW
EGFR: 44 ML/MIN/1.73M2 — LOW
EGFR: 52 ML/MIN/1.73M2 — LOW
EGFR: 53 ML/MIN/1.73M2 — LOW
EGFR: 56 ML/MIN/1.73M2
EGFR: 57 ML/MIN/1.73M2 — LOW
EGFR: 64 ML/MIN/1.73M2 — SIGNIFICANT CHANGE UP
EGFR: 67 ML/MIN/1.73M2 — SIGNIFICANT CHANGE UP
EOSINOPHIL # BLD AUTO: 0 K/UL — SIGNIFICANT CHANGE UP (ref 0–0.5)
EOSINOPHIL # BLD AUTO: 0.07 K/UL — SIGNIFICANT CHANGE UP (ref 0–0.5)
EOSINOPHIL # BLD AUTO: 0.18 K/UL — SIGNIFICANT CHANGE UP (ref 0–0.5)
EOSINOPHIL NFR BLD AUTO: 0 % — SIGNIFICANT CHANGE UP (ref 0–6)
EOSINOPHIL NFR BLD AUTO: 1.4 % — SIGNIFICANT CHANGE UP (ref 0–6)
EOSINOPHIL NFR BLD AUTO: 2.9 % — SIGNIFICANT CHANGE UP (ref 0–6)
FERRITIN SERPL-MCNC: 72 NG/ML
FOLATE SERPL-MCNC: >20 NG/ML
GAS PNL BLDA: SIGNIFICANT CHANGE UP
GAS PNL BLDV: 138 MMOL/L — SIGNIFICANT CHANGE UP (ref 136–145)
GAS PNL BLDV: 139 MMOL/L — SIGNIFICANT CHANGE UP (ref 136–145)
GAS PNL BLDV: SIGNIFICANT CHANGE UP
GIANT PLATELETS BLD QL SMEAR: PRESENT — SIGNIFICANT CHANGE UP
GLUCOSE SERPL-MCNC: 103 MG/DL
GLUCOSE SERPL-MCNC: 104 MG/DL — HIGH (ref 70–99)
GLUCOSE SERPL-MCNC: 109 MG/DL — HIGH (ref 70–99)
GLUCOSE SERPL-MCNC: 110 MG/DL — HIGH (ref 70–99)
GLUCOSE SERPL-MCNC: 129 MG/DL — HIGH (ref 70–99)
GLUCOSE SERPL-MCNC: 134 MG/DL — HIGH (ref 70–99)
GLUCOSE SERPL-MCNC: 152 MG/DL — HIGH (ref 70–99)
GLUCOSE SERPL-MCNC: 231 MG/DL — HIGH (ref 70–99)
GLUCOSE UR QL: NEGATIVE — SIGNIFICANT CHANGE UP
GRAM STN FLD: SIGNIFICANT CHANGE UP
HCO3 BLDA-SCNC: 22 MMOL/L — SIGNIFICANT CHANGE UP (ref 21–28)
HCO3 BLDA-SCNC: 24 MMOL/L — SIGNIFICANT CHANGE UP (ref 21–28)
HCO3 BLDA-SCNC: 24 MMOL/L — SIGNIFICANT CHANGE UP (ref 21–28)
HCO3 BLDV-SCNC: 29 MMOL/L — SIGNIFICANT CHANGE UP (ref 22–29)
HCO3 BLDV-SCNC: 29 MMOL/L — SIGNIFICANT CHANGE UP (ref 22–29)
HCO3 BLDV-SCNC: 31 MMOL/L — HIGH (ref 22–29)
HCT VFR BLD CALC: 22.7 % — LOW (ref 39–50)
HCT VFR BLD CALC: 26.1 % — LOW (ref 39–50)
HCT VFR BLD CALC: 29.3 % — LOW (ref 39–50)
HCT VFR BLD CALC: 29.6 % — LOW (ref 39–50)
HCT VFR BLD CALC: 29.8 % — LOW (ref 39–50)
HCT VFR BLD CALC: 30.4 % — LOW (ref 39–50)
HCT VFR BLD CALC: 30.9 % — LOW (ref 39–50)
HGB BLD-MCNC: 7.2 G/DL — LOW (ref 13–17)
HGB BLD-MCNC: 8.4 G/DL — LOW (ref 13–17)
HGB BLD-MCNC: 8.9 G/DL — LOW (ref 13–17)
HGB BLD-MCNC: 8.9 G/DL — LOW (ref 13–17)
HGB BLD-MCNC: 9.1 G/DL — LOW (ref 13–17)
HGB BLD-MCNC: 9.4 G/DL — LOW (ref 13–17)
HGB BLD-MCNC: 9.4 G/DL — LOW (ref 13–17)
HOROWITZ INDEX BLDA+IHG-RTO: 37 — SIGNIFICANT CHANGE UP
HYPOCHROMIA BLD QL: SIGNIFICANT CHANGE UP
IMM GRANULOCYTES NFR BLD AUTO: 0.3 % — SIGNIFICANT CHANGE UP (ref 0–0.9)
IMM GRANULOCYTES NFR BLD AUTO: 0.4 % — SIGNIFICANT CHANGE UP (ref 0–0.9)
IMM GRANULOCYTES NFR BLD AUTO: 0.5 % — SIGNIFICANT CHANGE UP (ref 0–0.9)
IMM GRANULOCYTES NFR BLD AUTO: 0.7 % — SIGNIFICANT CHANGE UP (ref 0–0.9)
IMM GRANULOCYTES NFR BLD AUTO: 0.7 % — SIGNIFICANT CHANGE UP (ref 0–0.9)
INR BLD: 1.04 — SIGNIFICANT CHANGE UP (ref 0.85–1.18)
INR BLD: 1.12 — SIGNIFICANT CHANGE UP (ref 0.85–1.18)
INR PPP: 1.05
IRON SATN MFR SERPL: 17 %
IRON SERPL-MCNC: 36 UG/DL
KETONES UR-MCNC: 15 MG/DL
LACTATE SERPL-SCNC: 1.3 MMOL/L — SIGNIFICANT CHANGE UP (ref 0.5–2)
LACTATE SERPL-SCNC: 1.5 MMOL/L — SIGNIFICANT CHANGE UP (ref 0.5–2)
LEUKOCYTE ESTERASE UR-ACNC: NEGATIVE — SIGNIFICANT CHANGE UP
LYMPHOCYTES # BLD AUTO: 0 % — LOW (ref 13–44)
LYMPHOCYTES # BLD AUTO: 0 K/UL — LOW (ref 1–3.3)
LYMPHOCYTES # BLD AUTO: 0.19 K/UL — LOW (ref 1–3.3)
LYMPHOCYTES # BLD AUTO: 0.3 K/UL — LOW (ref 1–3.3)
LYMPHOCYTES # BLD AUTO: 0.3 K/UL — LOW (ref 1–3.3)
LYMPHOCYTES # BLD AUTO: 0.54 K/UL — LOW (ref 1–3.3)
LYMPHOCYTES # BLD AUTO: 0.69 K/UL — LOW (ref 1–3.3)
LYMPHOCYTES # BLD AUTO: 1.24 K/UL — SIGNIFICANT CHANGE UP (ref 1–3.3)
LYMPHOCYTES # BLD AUTO: 1.7 % — LOW (ref 13–44)
LYMPHOCYTES # BLD AUTO: 13.5 % — SIGNIFICANT CHANGE UP (ref 13–44)
LYMPHOCYTES # BLD AUTO: 20 % — SIGNIFICANT CHANGE UP (ref 13–44)
LYMPHOCYTES # BLD AUTO: 3.6 % — LOW (ref 13–44)
LYMPHOCYTES # BLD AUTO: 3.8 % — LOW (ref 13–44)
LYMPHOCYTES # BLD AUTO: 5.2 % — LOW (ref 13–44)
MACROCYTES BLD QL: SLIGHT — SIGNIFICANT CHANGE UP
MACROCYTES BLD QL: SLIGHT — SIGNIFICANT CHANGE UP
MAGNESIUM SERPL-MCNC: 1.8 MG/DL — SIGNIFICANT CHANGE UP (ref 1.6–2.6)
MAGNESIUM SERPL-MCNC: 1.8 MG/DL — SIGNIFICANT CHANGE UP (ref 1.6–2.6)
MAGNESIUM SERPL-MCNC: 2.3 MG/DL — SIGNIFICANT CHANGE UP (ref 1.6–2.6)
MAGNESIUM SERPL-MCNC: 2.4 MG/DL — SIGNIFICANT CHANGE UP (ref 1.6–2.6)
MAGNESIUM SERPL-MCNC: 2.5 MG/DL — SIGNIFICANT CHANGE UP (ref 1.6–2.6)
MAGNESIUM SERPL-MCNC: 2.6 MG/DL — SIGNIFICANT CHANGE UP (ref 1.6–2.6)
MANUAL SMEAR VERIFICATION: SIGNIFICANT CHANGE UP
MANUAL SMEAR VERIFICATION: SIGNIFICANT CHANGE UP
MCHC RBC-ENTMCNC: 29.6 PG — SIGNIFICANT CHANGE UP (ref 27–34)
MCHC RBC-ENTMCNC: 29.8 PG — SIGNIFICANT CHANGE UP (ref 27–34)
MCHC RBC-ENTMCNC: 29.8 PG — SIGNIFICANT CHANGE UP (ref 27–34)
MCHC RBC-ENTMCNC: 29.9 GM/DL — LOW (ref 32–36)
MCHC RBC-ENTMCNC: 29.9 PG — SIGNIFICANT CHANGE UP (ref 27–34)
MCHC RBC-ENTMCNC: 29.9 PG — SIGNIFICANT CHANGE UP (ref 27–34)
MCHC RBC-ENTMCNC: 30.3 PG — SIGNIFICANT CHANGE UP (ref 27–34)
MCHC RBC-ENTMCNC: 30.4 GM/DL — LOW (ref 32–36)
MCHC RBC-ENTMCNC: 30.4 GM/DL — LOW (ref 32–36)
MCHC RBC-ENTMCNC: 30.7 GM/DL — LOW (ref 32–36)
MCHC RBC-ENTMCNC: 30.9 GM/DL — LOW (ref 32–36)
MCHC RBC-ENTMCNC: 31.1 PG — SIGNIFICANT CHANGE UP (ref 27–34)
MCHC RBC-ENTMCNC: 31.7 GM/DL — LOW (ref 32–36)
MCHC RBC-ENTMCNC: 32.2 GM/DL — SIGNIFICANT CHANGE UP (ref 32–36)
MCV RBC AUTO: 95.4 FL — SIGNIFICANT CHANGE UP (ref 80–100)
MCV RBC AUTO: 96.5 FL — SIGNIFICANT CHANGE UP (ref 80–100)
MCV RBC AUTO: 96.7 FL — SIGNIFICANT CHANGE UP (ref 80–100)
MCV RBC AUTO: 97.4 FL — SIGNIFICANT CHANGE UP (ref 80–100)
MCV RBC AUTO: 98.1 FL — SIGNIFICANT CHANGE UP (ref 80–100)
MCV RBC AUTO: 98.3 FL — SIGNIFICANT CHANGE UP (ref 80–100)
MCV RBC AUTO: 99 FL — SIGNIFICANT CHANGE UP (ref 80–100)
METHOD TYPE: SIGNIFICANT CHANGE UP
MICROCYTES BLD QL: SLIGHT — SIGNIFICANT CHANGE UP
MICROCYTES BLD QL: SLIGHT — SIGNIFICANT CHANGE UP
MONOCYTES # BLD AUTO: 0.15 K/UL — SIGNIFICANT CHANGE UP (ref 0–0.9)
MONOCYTES # BLD AUTO: 0.52 K/UL — SIGNIFICANT CHANGE UP (ref 0–0.9)
MONOCYTES # BLD AUTO: 0.55 K/UL — SIGNIFICANT CHANGE UP (ref 0–0.9)
MONOCYTES # BLD AUTO: 0.58 K/UL — SIGNIFICANT CHANGE UP (ref 0–0.9)
MONOCYTES # BLD AUTO: 0.63 K/UL — SIGNIFICANT CHANGE UP (ref 0–0.9)
MONOCYTES # BLD AUTO: 0.75 K/UL — SIGNIFICANT CHANGE UP (ref 0–0.9)
MONOCYTES # BLD AUTO: 1.25 K/UL — HIGH (ref 0–0.9)
MONOCYTES NFR BLD AUTO: 1.8 % — LOW (ref 2–14)
MONOCYTES NFR BLD AUTO: 12 % — SIGNIFICANT CHANGE UP (ref 2–14)
MONOCYTES NFR BLD AUTO: 12.1 % — SIGNIFICANT CHANGE UP (ref 2–14)
MONOCYTES NFR BLD AUTO: 12.3 % — SIGNIFICANT CHANGE UP (ref 2–14)
MONOCYTES NFR BLD AUTO: 5.2 % — SIGNIFICANT CHANGE UP (ref 2–14)
MONOCYTES NFR BLD AUTO: 6.5 % — SIGNIFICANT CHANGE UP (ref 2–14)
MONOCYTES NFR BLD AUTO: 6.7 % — SIGNIFICANT CHANGE UP (ref 2–14)
MRSA PCR RESULT.: NEGATIVE — SIGNIFICANT CHANGE UP
NEUTROPHILS # BLD AUTO: 10.38 K/UL — HIGH (ref 1.8–7.4)
NEUTROPHILS # BLD AUTO: 3.69 K/UL — SIGNIFICANT CHANGE UP (ref 1.8–7.4)
NEUTROPHILS # BLD AUTO: 3.99 K/UL — SIGNIFICANT CHANGE UP (ref 1.8–7.4)
NEUTROPHILS # BLD AUTO: 7.09 K/UL — SIGNIFICANT CHANGE UP (ref 1.8–7.4)
NEUTROPHILS # BLD AUTO: 7.36 K/UL — SIGNIFICANT CHANGE UP (ref 1.8–7.4)
NEUTROPHILS # BLD AUTO: 7.96 K/UL — HIGH (ref 1.8–7.4)
NEUTROPHILS # BLD AUTO: 8.53 K/UL — HIGH (ref 1.8–7.4)
NEUTROPHILS NFR BLD AUTO: 64.2 % — SIGNIFICANT CHANGE UP (ref 43–77)
NEUTROPHILS NFR BLD AUTO: 72.2 % — SIGNIFICANT CHANGE UP (ref 43–77)
NEUTROPHILS NFR BLD AUTO: 82.1 % — HIGH (ref 43–77)
NEUTROPHILS NFR BLD AUTO: 89 % — HIGH (ref 43–77)
NEUTROPHILS NFR BLD AUTO: 89.2 % — HIGH (ref 43–77)
NEUTROPHILS NFR BLD AUTO: 93.1 % — HIGH (ref 43–77)
NEUTROPHILS NFR BLD AUTO: 98.2 % — HIGH (ref 43–77)
NITRITE UR-MCNC: NEGATIVE — SIGNIFICANT CHANGE UP
NON-GYNECOLOGICAL CYTOLOGY STUDY: SIGNIFICANT CHANGE UP
NRBC # BLD: 0 /100 WBCS — SIGNIFICANT CHANGE UP (ref 0–0)
NRBC # BLD: 1 /100 — HIGH (ref 0–0)
NRBC # BLD: SIGNIFICANT CHANGE UP /100 WBCS (ref 0–0)
NT-PROBNP SERPL-SCNC: 1780 PG/ML — HIGH (ref 0–300)
ORGANISM # SPEC MICROSCOPIC CNT: SIGNIFICANT CHANGE UP
ORGANISM # SPEC MICROSCOPIC CNT: SIGNIFICANT CHANGE UP
OSMOLALITY UR: 503 MOSM/KG — SIGNIFICANT CHANGE UP (ref 300–900)
OVALOCYTES BLD QL SMEAR: SLIGHT — SIGNIFICANT CHANGE UP
OVALOCYTES BLD QL SMEAR: SLIGHT — SIGNIFICANT CHANGE UP
PCO2 BLDA: 34 MMHG — LOW (ref 35–48)
PCO2 BLDA: 34 MMHG — LOW (ref 35–48)
PCO2 BLDA: 45 MMHG — SIGNIFICANT CHANGE UP (ref 35–48)
PCO2 BLDV: 105 MMHG — HIGH (ref 42–55)
PCO2 BLDV: 61 MMHG — HIGH (ref 42–55)
PCO2 BLDV: 80 MMHG — HIGH (ref 42–55)
PH BLDA: 7.33 — LOW (ref 7.35–7.45)
PH BLDA: 7.42 — SIGNIFICANT CHANGE UP (ref 7.35–7.45)
PH BLDA: 7.46 — HIGH (ref 7.35–7.45)
PH BLDV: 7.08 — CRITICAL LOW (ref 7.32–7.43)
PH BLDV: 7.17 — CRITICAL LOW (ref 7.32–7.43)
PH BLDV: 7.29 — LOW (ref 7.32–7.43)
PH UR: 5 — SIGNIFICANT CHANGE UP (ref 5–8)
PHOSPHATE SERPL-MCNC: 2.5 MG/DL — SIGNIFICANT CHANGE UP (ref 2.5–4.5)
PHOSPHATE SERPL-MCNC: 2.6 MG/DL — SIGNIFICANT CHANGE UP (ref 2.5–4.5)
PHOSPHATE SERPL-MCNC: 3 MG/DL — SIGNIFICANT CHANGE UP (ref 2.5–4.5)
PHOSPHATE SERPL-MCNC: 3 MG/DL — SIGNIFICANT CHANGE UP (ref 2.5–4.5)
PHOSPHATE SERPL-MCNC: 3.8 MG/DL — SIGNIFICANT CHANGE UP (ref 2.5–4.5)
PLAT MORPH BLD: ABNORMAL
PLAT MORPH BLD: NORMAL — SIGNIFICANT CHANGE UP
PLATELET # BLD AUTO: 131 K/UL — LOW (ref 150–400)
PLATELET # BLD AUTO: 141 K/UL — LOW (ref 150–400)
PLATELET # BLD AUTO: 145 K/UL — LOW (ref 150–400)
PLATELET # BLD AUTO: 152 K/UL — SIGNIFICANT CHANGE UP (ref 150–400)
PLATELET # BLD AUTO: 164 K/UL — SIGNIFICANT CHANGE UP (ref 150–400)
PLATELET # BLD AUTO: 175 K/UL — SIGNIFICANT CHANGE UP (ref 150–400)
PLATELET # BLD AUTO: 188 K/UL — SIGNIFICANT CHANGE UP (ref 150–400)
PO2 BLDA: 143 MMHG — HIGH (ref 83–108)
PO2 BLDA: 154 MMHG — HIGH (ref 83–108)
PO2 BLDA: 157 MMHG — HIGH (ref 83–108)
PO2 BLDV: 42 MMHG — SIGNIFICANT CHANGE UP (ref 25–45)
PO2 BLDV: 49 MMHG — HIGH (ref 25–45)
PO2 BLDV: 61 MMHG — HIGH (ref 25–45)
POIKILOCYTOSIS BLD QL AUTO: SIGNIFICANT CHANGE UP
POIKILOCYTOSIS BLD QL AUTO: SLIGHT — SIGNIFICANT CHANGE UP
POLYCHROMASIA BLD QL SMEAR: SLIGHT — SIGNIFICANT CHANGE UP
POLYCHROMASIA BLD QL SMEAR: SLIGHT — SIGNIFICANT CHANGE UP
POTASSIUM BLDV-SCNC: 3.6 MMOL/L — SIGNIFICANT CHANGE UP (ref 3.5–5.1)
POTASSIUM BLDV-SCNC: 4.4 MMOL/L — SIGNIFICANT CHANGE UP (ref 3.5–5.1)
POTASSIUM SERPL-MCNC: 3.5 MMOL/L — SIGNIFICANT CHANGE UP (ref 3.5–5.3)
POTASSIUM SERPL-MCNC: 3.8 MMOL/L — SIGNIFICANT CHANGE UP (ref 3.5–5.3)
POTASSIUM SERPL-MCNC: 4 MMOL/L — SIGNIFICANT CHANGE UP (ref 3.5–5.3)
POTASSIUM SERPL-MCNC: 4.1 MMOL/L — SIGNIFICANT CHANGE UP (ref 3.5–5.3)
POTASSIUM SERPL-MCNC: 4.2 MMOL/L — SIGNIFICANT CHANGE UP (ref 3.5–5.3)
POTASSIUM SERPL-MCNC: 4.2 MMOL/L — SIGNIFICANT CHANGE UP (ref 3.5–5.3)
POTASSIUM SERPL-MCNC: 4.5 MMOL/L — SIGNIFICANT CHANGE UP (ref 3.5–5.3)
POTASSIUM SERPL-SCNC: 3.5 MMOL/L — SIGNIFICANT CHANGE UP (ref 3.5–5.3)
POTASSIUM SERPL-SCNC: 3.8 MMOL/L — SIGNIFICANT CHANGE UP (ref 3.5–5.3)
POTASSIUM SERPL-SCNC: 4 MMOL/L
POTASSIUM SERPL-SCNC: 4 MMOL/L — SIGNIFICANT CHANGE UP (ref 3.5–5.3)
POTASSIUM SERPL-SCNC: 4.1 MMOL/L — SIGNIFICANT CHANGE UP (ref 3.5–5.3)
POTASSIUM SERPL-SCNC: 4.2 MMOL/L — SIGNIFICANT CHANGE UP (ref 3.5–5.3)
POTASSIUM SERPL-SCNC: 4.2 MMOL/L — SIGNIFICANT CHANGE UP (ref 3.5–5.3)
POTASSIUM SERPL-SCNC: 4.5 MMOL/L — SIGNIFICANT CHANGE UP (ref 3.5–5.3)
PROT ?TM UR-MCNC: 26 MG/DL — HIGH (ref 0–12)
PROT SERPL-MCNC: 6.1 G/DL — SIGNIFICANT CHANGE UP (ref 6–8.3)
PROT SERPL-MCNC: 6.4 G/DL — SIGNIFICANT CHANGE UP (ref 6–8.3)
PROT SERPL-MCNC: 7.3 G/DL — SIGNIFICANT CHANGE UP (ref 6–8.3)
PROT SERPL-MCNC: 7.6 G/DL
PROT UR-MCNC: NEGATIVE MG/DL — SIGNIFICANT CHANGE UP
PROT/CREAT UR-RTO: 0.1 RATIO — SIGNIFICANT CHANGE UP (ref 0–0.2)
PROTHROM AB SERPL-ACNC: 11.8 SEC — SIGNIFICANT CHANGE UP (ref 9.5–13)
PROTHROM AB SERPL-ACNC: 12.7 SEC — SIGNIFICANT CHANGE UP (ref 9.5–13)
PT BLD: 11.9 SEC
RAPID RVP RESULT: SIGNIFICANT CHANGE UP
RBC # BLD: 2.38 M/UL — LOW (ref 4.2–5.8)
RBC # BLD: 2.7 M/UL — LOW (ref 4.2–5.8)
RBC # BLD: 2.98 M/UL — LOW (ref 4.2–5.8)
RBC # BLD: 3.01 M/UL — LOW (ref 4.2–5.8)
RBC # BLD: 3.04 M/UL — LOW (ref 4.2–5.8)
RBC # BLD: 3.15 M/UL — LOW (ref 4.2–5.8)
RBC # BLD: 3.15 M/UL — LOW (ref 4.2–5.8)
RBC # FLD: 13.9 % — SIGNIFICANT CHANGE UP (ref 10.3–14.5)
RBC # FLD: 14 % — SIGNIFICANT CHANGE UP (ref 10.3–14.5)
RBC # FLD: 14 % — SIGNIFICANT CHANGE UP (ref 10.3–14.5)
RBC # FLD: 14.3 % — SIGNIFICANT CHANGE UP (ref 10.3–14.5)
RBC # FLD: 14.3 % — SIGNIFICANT CHANGE UP (ref 10.3–14.5)
RBC BLD AUTO: ABNORMAL
RBC BLD AUTO: ABNORMAL
RH IG SCN BLD-IMP: POSITIVE — SIGNIFICANT CHANGE UP
RH IG SCN BLD-IMP: POSITIVE — SIGNIFICANT CHANGE UP
S AUREUS DNA NOSE QL NAA+PROBE: POSITIVE
SAO2 % BLDA: 99.1 % — HIGH (ref 94–98)
SAO2 % BLDA: 99.1 % — HIGH (ref 94–98)
SAO2 % BLDA: 99.4 % — HIGH (ref 94–98)
SAO2 % BLDV: 61.9 % — LOW (ref 67–88)
SAO2 % BLDV: 71.8 % — SIGNIFICANT CHANGE UP (ref 67–88)
SAO2 % BLDV: 81.5 % — SIGNIFICANT CHANGE UP (ref 67–88)
SARS-COV-2 RNA SPEC QL NAA+PROBE: SIGNIFICANT CHANGE UP
SARS-COV-2 RNA SPEC QL NAA+PROBE: SIGNIFICANT CHANGE UP
SCHISTOCYTES BLD QL AUTO: SLIGHT — SIGNIFICANT CHANGE UP
SODIUM SERPL-SCNC: 138 MMOL/L — SIGNIFICANT CHANGE UP (ref 135–145)
SODIUM SERPL-SCNC: 141 MMOL/L — SIGNIFICANT CHANGE UP (ref 135–145)
SODIUM SERPL-SCNC: 142 MMOL/L — SIGNIFICANT CHANGE UP (ref 135–145)
SODIUM SERPL-SCNC: 143 MMOL/L — SIGNIFICANT CHANGE UP (ref 135–145)
SODIUM SERPL-SCNC: 144 MMOL/L
SODIUM SERPL-SCNC: 144 MMOL/L — SIGNIFICANT CHANGE UP (ref 135–145)
SODIUM SERPL-SCNC: 146 MMOL/L — HIGH (ref 135–145)
SODIUM SERPL-SCNC: 147 MMOL/L — HIGH (ref 135–145)
SODIUM UR-SCNC: <20 MMOL/L — SIGNIFICANT CHANGE UP
SP GR SPEC: 1.02 — SIGNIFICANT CHANGE UP (ref 1–1.03)
SPECIMEN SOURCE: SIGNIFICANT CHANGE UP
SPHEROCYTES BLD QL SMEAR: SLIGHT — SIGNIFICANT CHANGE UP
SURGICAL PATHOLOGY STUDY: SIGNIFICANT CHANGE UP
T4 AB SER-ACNC: 6.45 UG/DL — SIGNIFICANT CHANGE UP (ref 4.5–11.7)
TARGETS BLD QL SMEAR: SLIGHT — SIGNIFICANT CHANGE UP
TIBC SERPL-MCNC: 211 UG/DL
TROPONIN T, HIGH SENSITIVITY RESULT: 194 NG/L — CRITICAL HIGH (ref 0–51)
TROPONIN T, HIGH SENSITIVITY RESULT: 256 NG/L — CRITICAL HIGH (ref 0–51)
TSH SERPL-ACNC: 3.8 UIU/ML
TSH SERPL-MCNC: 1.76 UIU/ML — SIGNIFICANT CHANGE UP (ref 0.27–4.2)
UIBC SERPL-MCNC: 175 UG/DL
UROBILINOGEN FLD QL: 0.2 E.U./DL — SIGNIFICANT CHANGE UP
VIT B12 SERPL-MCNC: 836 PG/ML
WBC # BLD: 10.39 K/UL — SIGNIFICANT CHANGE UP (ref 3.8–10.5)
WBC # BLD: 11.15 K/UL — HIGH (ref 3.8–10.5)
WBC # BLD: 5.11 K/UL — SIGNIFICANT CHANGE UP (ref 3.8–10.5)
WBC # BLD: 6.21 K/UL — SIGNIFICANT CHANGE UP (ref 3.8–10.5)
WBC # BLD: 7.95 K/UL — SIGNIFICANT CHANGE UP (ref 3.8–10.5)
WBC # BLD: 8.11 K/UL — SIGNIFICANT CHANGE UP (ref 3.8–10.5)
WBC # BLD: 8.27 K/UL — SIGNIFICANT CHANGE UP (ref 3.8–10.5)
WBC # FLD AUTO: 10.39 K/UL — SIGNIFICANT CHANGE UP (ref 3.8–10.5)
WBC # FLD AUTO: 11.15 K/UL — HIGH (ref 3.8–10.5)
WBC # FLD AUTO: 5.11 K/UL — SIGNIFICANT CHANGE UP (ref 3.8–10.5)
WBC # FLD AUTO: 6.21 K/UL — SIGNIFICANT CHANGE UP (ref 3.8–10.5)
WBC # FLD AUTO: 7.95 K/UL — SIGNIFICANT CHANGE UP (ref 3.8–10.5)
WBC # FLD AUTO: 8.11 K/UL — SIGNIFICANT CHANGE UP (ref 3.8–10.5)
WBC # FLD AUTO: 8.27 K/UL — SIGNIFICANT CHANGE UP (ref 3.8–10.5)

## 2023-01-01 PROCEDURE — G1004: CPT

## 2023-01-01 PROCEDURE — 88341 IMHCHEM/IMCYTCHM EA ADD ANTB: CPT

## 2023-01-01 PROCEDURE — 94660 CPAP INITIATION&MGMT: CPT

## 2023-01-01 PROCEDURE — 99285 EMERGENCY DEPT VISIT HI MDM: CPT | Mod: 25

## 2023-01-01 PROCEDURE — 76942 ECHO GUIDE FOR BIOPSY: CPT | Mod: 26

## 2023-01-01 PROCEDURE — 87640 STAPH A DNA AMP PROBE: CPT

## 2023-01-01 PROCEDURE — 99221 1ST HOSP IP/OBS SF/LOW 40: CPT

## 2023-01-01 PROCEDURE — 99233 SBSQ HOSP IP/OBS HIGH 50: CPT

## 2023-01-01 PROCEDURE — 81003 URINALYSIS AUTO W/O SCOPE: CPT

## 2023-01-01 PROCEDURE — 92610 EVALUATE SWALLOWING FUNCTION: CPT

## 2023-01-01 PROCEDURE — 85025 COMPLETE CBC W/AUTO DIFF WBC: CPT

## 2023-01-01 PROCEDURE — 87641 MR-STAPH DNA AMP PROBE: CPT

## 2023-01-01 PROCEDURE — 70491 CT SOFT TISSUE NECK W/DYE: CPT | Mod: 26,MG

## 2023-01-01 PROCEDURE — 88173 CYTOPATH EVAL FNA REPORT: CPT | Mod: 26

## 2023-01-01 PROCEDURE — 85610 PROTHROMBIN TIME: CPT

## 2023-01-01 PROCEDURE — 96367 TX/PROPH/DG ADDL SEQ IV INF: CPT

## 2023-01-01 PROCEDURE — 99291 CRITICAL CARE FIRST HOUR: CPT

## 2023-01-01 PROCEDURE — 86901 BLOOD TYPING SEROLOGIC RH(D): CPT

## 2023-01-01 PROCEDURE — 80048 BASIC METABOLIC PNL TOTAL CA: CPT

## 2023-01-01 PROCEDURE — 36415 COLL VENOUS BLD VENIPUNCTURE: CPT

## 2023-01-01 PROCEDURE — 70491 CT SOFT TISSUE NECK W/DYE: CPT | Mod: MG

## 2023-01-01 PROCEDURE — 70450 CT HEAD/BRAIN W/O DYE: CPT | Mod: MG

## 2023-01-01 PROCEDURE — 99205 OFFICE O/P NEW HI 60 MIN: CPT

## 2023-01-01 PROCEDURE — 74177 CT ABD & PELVIS W/CONTRAST: CPT | Mod: 26

## 2023-01-01 PROCEDURE — 83735 ASSAY OF MAGNESIUM: CPT

## 2023-01-01 PROCEDURE — 94640 AIRWAY INHALATION TREATMENT: CPT

## 2023-01-01 PROCEDURE — 83605 ASSAY OF LACTIC ACID: CPT

## 2023-01-01 PROCEDURE — 99291 CRITICAL CARE FIRST HOUR: CPT | Mod: 25

## 2023-01-01 PROCEDURE — 38505 NEEDLE BIOPSY LYMPH NODES: CPT

## 2023-01-01 PROCEDURE — 71045 X-RAY EXAM CHEST 1 VIEW: CPT | Mod: 26

## 2023-01-01 PROCEDURE — 82803 BLOOD GASES ANY COMBINATION: CPT

## 2023-01-01 PROCEDURE — 84300 ASSAY OF URINE SODIUM: CPT

## 2023-01-01 PROCEDURE — 99233 SBSQ HOSP IP/OBS HIGH 50: CPT | Mod: GC

## 2023-01-01 PROCEDURE — 43753 TX GASTRO INTUB W/ASP: CPT

## 2023-01-01 PROCEDURE — 92611 MOTION FLUOROSCOPY/SWALLOW: CPT | Mod: GN

## 2023-01-01 PROCEDURE — 84100 ASSAY OF PHOSPHORUS: CPT

## 2023-01-01 PROCEDURE — 84132 ASSAY OF SERUM POTASSIUM: CPT

## 2023-01-01 PROCEDURE — 99203 OFFICE O/P NEW LOW 30 MIN: CPT | Mod: 25

## 2023-01-01 PROCEDURE — 97161 PT EVAL LOW COMPLEX 20 MIN: CPT

## 2023-01-01 PROCEDURE — 84443 ASSAY THYROID STIM HORMONE: CPT

## 2023-01-01 PROCEDURE — 99285 EMERGENCY DEPT VISIT HI MDM: CPT

## 2023-01-01 PROCEDURE — 74230 X-RAY XM SWLNG FUNCJ C+: CPT | Mod: 26

## 2023-01-01 PROCEDURE — 96375 TX/PRO/DX INJ NEW DRUG ADDON: CPT

## 2023-01-01 PROCEDURE — 99232 SBSQ HOSP IP/OBS MODERATE 35: CPT | Mod: GC

## 2023-01-01 PROCEDURE — 71045 X-RAY EXAM CHEST 1 VIEW: CPT

## 2023-01-01 PROCEDURE — 87186 SC STD MICRODIL/AGAR DIL: CPT

## 2023-01-01 PROCEDURE — 31575 DIAGNOSTIC LARYNGOSCOPY: CPT

## 2023-01-01 PROCEDURE — 38505 NEEDLE BIOPSY LYMPH NODES: CPT | Mod: LT

## 2023-01-01 PROCEDURE — 99497 ADVNCD CARE PLAN 30 MIN: CPT

## 2023-01-01 PROCEDURE — 93971 EXTREMITY STUDY: CPT | Mod: 26,GC

## 2023-01-01 PROCEDURE — 99223 1ST HOSP IP/OBS HIGH 75: CPT

## 2023-01-01 PROCEDURE — 99239 HOSP IP/OBS DSCHRG MGMT >30: CPT

## 2023-01-01 PROCEDURE — 43235 EGD DIAGNOSTIC BRUSH WASH: CPT

## 2023-01-01 PROCEDURE — 99223 1ST HOSP IP/OBS HIGH 75: CPT | Mod: GC

## 2023-01-01 PROCEDURE — 82330 ASSAY OF CALCIUM: CPT

## 2023-01-01 PROCEDURE — 83880 ASSAY OF NATRIURETIC PEPTIDE: CPT

## 2023-01-01 PROCEDURE — 74230 X-RAY XM SWLNG FUNCJ C+: CPT

## 2023-01-01 PROCEDURE — 86900 BLOOD TYPING SEROLOGIC ABO: CPT

## 2023-01-01 PROCEDURE — 93005 ELECTROCARDIOGRAM TRACING: CPT

## 2023-01-01 PROCEDURE — 99497 ADVNCD CARE PLAN 30 MIN: CPT | Mod: 25

## 2023-01-01 PROCEDURE — 84436 ASSAY OF TOTAL THYROXINE: CPT

## 2023-01-01 PROCEDURE — 84295 ASSAY OF SERUM SODIUM: CPT

## 2023-01-01 PROCEDURE — 96366 THER/PROPH/DIAG IV INF ADDON: CPT

## 2023-01-01 PROCEDURE — 85730 THROMBOPLASTIN TIME PARTIAL: CPT

## 2023-01-01 PROCEDURE — 96374 THER/PROPH/DIAG INJ IV PUSH: CPT

## 2023-01-01 PROCEDURE — 93308 TTE F-UP OR LMTD: CPT | Mod: 26,GC

## 2023-01-01 PROCEDURE — 71260 CT THORAX DX C+: CPT | Mod: MG

## 2023-01-01 PROCEDURE — 71260 CT THORAX DX C+: CPT | Mod: 26

## 2023-01-01 PROCEDURE — 71260 CT THORAX DX C+: CPT

## 2023-01-01 PROCEDURE — 80053 COMPREHEN METABOLIC PANEL: CPT

## 2023-01-01 PROCEDURE — 88342 IMHCHEM/IMCYTCHM 1ST ANTB: CPT | Mod: 26

## 2023-01-01 PROCEDURE — 84156 ASSAY OF PROTEIN URINE: CPT

## 2023-01-01 PROCEDURE — 74177 CT ABD & PELVIS W/CONTRAST: CPT

## 2023-01-01 PROCEDURE — 88305 TISSUE EXAM BY PATHOLOGIST: CPT | Mod: 26

## 2023-01-01 PROCEDURE — 71260 CT THORAX DX C+: CPT | Mod: 26,MG

## 2023-01-01 PROCEDURE — 31500 INSERT EMERGENCY AIRWAY: CPT

## 2023-01-01 PROCEDURE — 76942 ECHO GUIDE FOR BIOPSY: CPT

## 2023-01-01 PROCEDURE — 99285 EMERGENCY DEPT VISIT HI MDM: CPT | Mod: FS

## 2023-01-01 PROCEDURE — 82570 ASSAY OF URINE CREATININE: CPT

## 2023-01-01 PROCEDURE — 96368 THER/DIAG CONCURRENT INF: CPT

## 2023-01-01 PROCEDURE — 88305 TISSUE EXAM BY PATHOLOGIST: CPT

## 2023-01-01 PROCEDURE — 87070 CULTURE OTHR SPECIMN AEROBIC: CPT

## 2023-01-01 PROCEDURE — 71275 CT ANGIOGRAPHY CHEST: CPT | Mod: MA

## 2023-01-01 PROCEDURE — 70450 CT HEAD/BRAIN W/O DYE: CPT | Mod: 26,MG

## 2023-01-01 PROCEDURE — 87635 SARS-COV-2 COVID-19 AMP PRB: CPT

## 2023-01-01 PROCEDURE — 94003 VENT MGMT INPAT SUBQ DAY: CPT

## 2023-01-01 PROCEDURE — 76604 US EXAM CHEST: CPT | Mod: 26,GC

## 2023-01-01 PROCEDURE — 88173 CYTOPATH EVAL FNA REPORT: CPT

## 2023-01-01 PROCEDURE — 99222 1ST HOSP IP/OBS MODERATE 55: CPT | Mod: GC

## 2023-01-01 PROCEDURE — 88341 IMHCHEM/IMCYTCHM EA ADD ANTB: CPT | Mod: 26

## 2023-01-01 PROCEDURE — 0225U NFCT DS DNA&RNA 21 SARSCOV2: CPT

## 2023-01-01 PROCEDURE — 87040 BLOOD CULTURE FOR BACTERIA: CPT

## 2023-01-01 PROCEDURE — 86850 RBC ANTIBODY SCREEN: CPT

## 2023-01-01 PROCEDURE — 83935 ASSAY OF URINE OSMOLALITY: CPT

## 2023-01-01 PROCEDURE — 96365 THER/PROPH/DIAG IV INF INIT: CPT | Mod: XU

## 2023-01-01 PROCEDURE — 71275 CT ANGIOGRAPHY CHEST: CPT | Mod: 26,MA

## 2023-01-01 PROCEDURE — 94002 VENT MGMT INPAT INIT DAY: CPT

## 2023-01-01 PROCEDURE — 84484 ASSAY OF TROPONIN QUANT: CPT

## 2023-01-01 RX ORDER — IOHEXOL 300 MG/ML
30 INJECTION, SOLUTION INTRAVENOUS ONCE
Refills: 0 | Status: COMPLETED | OUTPATIENT
Start: 2023-01-01 | End: 2023-01-01

## 2023-01-01 RX ORDER — ATENOLOL 25 MG/1
50 TABLET ORAL EVERY 12 HOURS
Refills: 0 | Status: DISCONTINUED | OUTPATIENT
Start: 2023-01-01 | End: 2023-01-01

## 2023-01-01 RX ORDER — PANTOPRAZOLE SODIUM 20 MG/1
40 TABLET, DELAYED RELEASE ORAL
Refills: 0 | Status: DISCONTINUED | OUTPATIENT
Start: 2023-01-01 | End: 2023-01-01

## 2023-01-01 RX ORDER — DORZOLAMIDE HYDROCHLORIDE TIMOLOL MALEATE 20; 5 MG/ML; MG/ML
1 SOLUTION/ DROPS OPHTHALMIC
Refills: 0 | DISCHARGE

## 2023-01-01 RX ORDER — CHLORHEXIDINE GLUCONATE 213 G/1000ML
1 SOLUTION TOPICAL
Refills: 0 | Status: DISCONTINUED | OUTPATIENT
Start: 2023-01-01 | End: 2023-01-01

## 2023-01-01 RX ORDER — PANTOPRAZOLE SODIUM 20 MG/1
40 TABLET, DELAYED RELEASE ORAL DAILY
Refills: 0 | Status: DISCONTINUED | OUTPATIENT
Start: 2023-01-01 | End: 2023-01-01

## 2023-01-01 RX ORDER — ACETAMINOPHEN 500 MG
1000 TABLET ORAL ONCE
Refills: 0 | Status: COMPLETED | OUTPATIENT
Start: 2023-01-01 | End: 2023-01-01

## 2023-01-01 RX ORDER — IPRATROPIUM/ALBUTEROL SULFATE 18-103MCG
3 AEROSOL WITH ADAPTER (GRAM) INHALATION EVERY 6 HOURS
Refills: 0 | Status: DISCONTINUED | OUTPATIENT
Start: 2023-01-01 | End: 2023-01-01

## 2023-01-01 RX ORDER — SODIUM CHLORIDE 9 MG/ML
1000 INJECTION, SOLUTION INTRAVENOUS
Refills: 0 | Status: DISCONTINUED | OUTPATIENT
Start: 2023-01-01 | End: 2023-01-01

## 2023-01-01 RX ORDER — PIPERACILLIN AND TAZOBACTAM 4; .5 G/20ML; G/20ML
4.5 INJECTION, POWDER, LYOPHILIZED, FOR SOLUTION INTRAVENOUS ONCE
Refills: 0 | Status: COMPLETED | OUTPATIENT
Start: 2023-01-01 | End: 2023-01-01

## 2023-01-01 RX ORDER — CEFTRIAXONE 500 MG/1
2000 INJECTION, POWDER, FOR SOLUTION INTRAMUSCULAR; INTRAVENOUS EVERY 24 HOURS
Refills: 0 | Status: DISCONTINUED | OUTPATIENT
Start: 2023-01-01 | End: 2023-01-01

## 2023-01-01 RX ORDER — DEXMEDETOMIDINE HYDROCHLORIDE IN 0.9% SODIUM CHLORIDE 4 UG/ML
0.1 INJECTION INTRAVENOUS
Qty: 200 | Refills: 0 | Status: DISCONTINUED | OUTPATIENT
Start: 2023-01-01 | End: 2023-01-01

## 2023-01-01 RX ORDER — AMITRIPTYLINE HYDROCHLORIDE 10 MG/1
10 TABLET, FILM COATED ORAL
Qty: 60 | Refills: 0 | Status: ACTIVE | COMMUNITY
Start: 2023-01-01

## 2023-01-01 RX ORDER — ETOMIDATE 2 MG/ML
20 INJECTION INTRAVENOUS ONCE
Refills: 0 | Status: COMPLETED | OUTPATIENT
Start: 2023-01-01 | End: 2023-01-01

## 2023-01-01 RX ORDER — CHLORHEXIDINE GLUCONATE 213 G/1000ML
15 SOLUTION TOPICAL EVERY 12 HOURS
Refills: 0 | Status: DISCONTINUED | OUTPATIENT
Start: 2023-01-01 | End: 2023-01-01

## 2023-01-01 RX ORDER — MAGNESIUM SULFATE 500 MG/ML
2 VIAL (ML) INJECTION ONCE
Refills: 0 | Status: COMPLETED | OUTPATIENT
Start: 2023-01-01 | End: 2023-01-01

## 2023-01-01 RX ORDER — BUDESONIDE AND FORMOTEROL FUMARATE DIHYDRATE 160; 4.5 UG/1; UG/1
2 AEROSOL RESPIRATORY (INHALATION)
Refills: 0 | DISCHARGE

## 2023-01-01 RX ORDER — SODIUM CHLORIDE 9 MG/ML
1000 INJECTION INTRAMUSCULAR; INTRAVENOUS; SUBCUTANEOUS ONCE
Refills: 0 | Status: COMPLETED | OUTPATIENT
Start: 2023-01-01 | End: 2023-01-01

## 2023-01-01 RX ORDER — DEXMEDETOMIDINE HYDROCHLORIDE IN 0.9% SODIUM CHLORIDE 4 UG/ML
0.2 INJECTION INTRAVENOUS
Qty: 400 | Refills: 0 | Status: DISCONTINUED | OUTPATIENT
Start: 2023-01-01 | End: 2023-01-01

## 2023-01-01 RX ORDER — PANTOPRAZOLE SODIUM 20 MG/1
1 TABLET, DELAYED RELEASE ORAL
Qty: 30 | Refills: 1
Start: 2023-01-01

## 2023-01-01 RX ORDER — AMITRIPTYLINE HCL 25 MG
1 TABLET ORAL
Refills: 0 | DISCHARGE

## 2023-01-01 RX ORDER — LOSARTAN POTASSIUM 100 MG/1
100 TABLET, FILM COATED ORAL DAILY
Refills: 0 | Status: DISCONTINUED | OUTPATIENT
Start: 2023-01-01 | End: 2023-01-01

## 2023-01-01 RX ORDER — SODIUM CHLORIDE 9 MG/ML
500 INJECTION, SOLUTION INTRAVENOUS ONCE
Refills: 0 | Status: COMPLETED | OUTPATIENT
Start: 2023-01-01 | End: 2023-01-01

## 2023-01-01 RX ORDER — ALBUTEROL 90 UG/1
2 AEROSOL, METERED ORAL
Refills: 0 | DISCHARGE

## 2023-01-01 RX ORDER — AZITHROMYCIN 500 MG/1
500 TABLET, FILM COATED ORAL ONCE
Refills: 0 | Status: COMPLETED | OUTPATIENT
Start: 2023-01-01 | End: 2023-01-01

## 2023-01-01 RX ORDER — ATENOLOL 25 MG/1
1 TABLET ORAL
Refills: 0 | DISCHARGE

## 2023-01-01 RX ORDER — PROPOFOL 10 MG/ML
0.5 INJECTION, EMULSION INTRAVENOUS
Qty: 1000 | Refills: 0 | Status: DISCONTINUED | OUTPATIENT
Start: 2023-01-01 | End: 2023-01-01

## 2023-01-01 RX ORDER — CEFTRIAXONE 500 MG/1
2000 INJECTION, POWDER, FOR SOLUTION INTRAMUSCULAR; INTRAVENOUS ONCE
Refills: 0 | Status: COMPLETED | OUTPATIENT
Start: 2023-01-01 | End: 2023-01-01

## 2023-01-01 RX ORDER — SUCCINYLCHOLINE CHLORIDE 100 MG/5ML
100 SYRINGE (ML) INTRAVENOUS ONCE
Refills: 0 | Status: COMPLETED | OUTPATIENT
Start: 2023-01-01 | End: 2023-01-01

## 2023-01-01 RX ORDER — MORPHINE SULFATE 50 MG/1
4 CAPSULE, EXTENDED RELEASE ORAL ONCE
Refills: 0 | Status: DISCONTINUED | OUTPATIENT
Start: 2023-01-01 | End: 2023-01-01

## 2023-01-01 RX ORDER — LOSARTAN POTASSIUM 100 MG/1
1 TABLET, FILM COATED ORAL
Qty: 0 | Refills: 0 | DISCHARGE
Start: 2023-01-01

## 2023-01-01 RX ORDER — IPRATROPIUM/ALBUTEROL SULFATE 18-103MCG
3 AEROSOL WITH ADAPTER (GRAM) INHALATION
Refills: 0 | Status: COMPLETED | OUTPATIENT
Start: 2023-01-01 | End: 2023-01-01

## 2023-01-01 RX ORDER — HEPARIN SODIUM 5000 [USP'U]/ML
5000 INJECTION INTRAVENOUS; SUBCUTANEOUS EVERY 8 HOURS
Refills: 0 | Status: DISCONTINUED | OUTPATIENT
Start: 2023-01-01 | End: 2023-01-01

## 2023-01-01 RX ORDER — CEFTRIAXONE 500 MG/1
1000 INJECTION, POWDER, FOR SOLUTION INTRAMUSCULAR; INTRAVENOUS ONCE
Refills: 0 | Status: COMPLETED | OUTPATIENT
Start: 2023-01-01 | End: 2023-01-01

## 2023-01-01 RX ORDER — MORPHINE SULFATE 50 MG/1
2 CAPSULE, EXTENDED RELEASE ORAL
Refills: 0 | Status: DISCONTINUED | OUTPATIENT
Start: 2023-01-01 | End: 2023-01-01

## 2023-01-01 RX ORDER — MORPHINE SULFATE 50 MG/1
1.25 CAPSULE, EXTENDED RELEASE ORAL
Qty: 10 | Refills: 0
Start: 2023-01-01 | End: 2023-01-01

## 2023-01-01 RX ORDER — MORPHINE SULFATE 50 MG/1
2 CAPSULE, EXTENDED RELEASE ORAL EVERY 4 HOURS
Refills: 0 | Status: DISCONTINUED | OUTPATIENT
Start: 2023-01-01 | End: 2023-01-01

## 2023-01-01 RX ORDER — PILOCARPINE HCL 4 %
1 DROPS OPHTHALMIC (EYE)
Refills: 0 | DISCHARGE

## 2023-01-01 RX ORDER — AMOXICILLIN 500 MG/1
500 CAPSULE ORAL
Qty: 21 | Refills: 0 | Status: COMPLETED | COMMUNITY
Start: 2023-01-01

## 2023-01-01 RX ORDER — LOSARTAN POTASSIUM 100 MG/1
1 TABLET, FILM COATED ORAL
Refills: 0 | DISCHARGE

## 2023-01-01 RX ORDER — AMLODIPINE BESYLATE 2.5 MG/1
10 TABLET ORAL DAILY
Refills: 0 | Status: DISCONTINUED | OUTPATIENT
Start: 2023-01-01 | End: 2023-01-01

## 2023-01-01 RX ORDER — HYDRALAZINE HCL 50 MG
5 TABLET ORAL EVERY 8 HOURS
Refills: 0 | Status: DISCONTINUED | OUTPATIENT
Start: 2023-01-01 | End: 2023-01-01

## 2023-01-01 RX ORDER — VANCOMYCIN HCL 1 G
1250 VIAL (EA) INTRAVENOUS ONCE
Refills: 0 | Status: COMPLETED | OUTPATIENT
Start: 2023-01-01 | End: 2023-01-01

## 2023-01-01 RX ORDER — PIPERACILLIN AND TAZOBACTAM 4; .5 G/20ML; G/20ML
4.5 INJECTION, POWDER, LYOPHILIZED, FOR SOLUTION INTRAVENOUS EVERY 8 HOURS
Refills: 0 | Status: DISCONTINUED | OUTPATIENT
Start: 2023-01-01 | End: 2023-01-01

## 2023-01-01 RX ORDER — SODIUM CHLORIDE 9 MG/ML
1000 INJECTION, SOLUTION INTRAVENOUS ONCE
Refills: 0 | Status: COMPLETED | OUTPATIENT
Start: 2023-01-01 | End: 2023-01-01

## 2023-01-01 RX ORDER — HYDROMORPHONE HYDROCHLORIDE 2 MG/ML
0.5 INJECTION INTRAMUSCULAR; INTRAVENOUS; SUBCUTANEOUS
Refills: 0 | Status: DISCONTINUED | OUTPATIENT
Start: 2023-01-01 | End: 2023-01-01

## 2023-01-01 RX ORDER — ALBUTEROL 90 UG/1
2.5 AEROSOL, METERED ORAL
Refills: 0 | Status: DISCONTINUED | OUTPATIENT
Start: 2023-01-01 | End: 2023-01-01

## 2023-01-01 RX ORDER — EPINEPHRINE 11.25MG/ML
0.5 SOLUTION, NON-ORAL INHALATION ONCE
Refills: 0 | Status: COMPLETED | OUTPATIENT
Start: 2023-01-01 | End: 2023-01-01

## 2023-01-01 RX ORDER — DIAZEPAM 5 MG/1
5 TABLET ORAL
Qty: 1 | Refills: 0 | Status: ACTIVE | COMMUNITY
Start: 2023-01-01

## 2023-01-01 RX ORDER — MORPHINE SULFATE 50 MG/1
2.5 CAPSULE, EXTENDED RELEASE ORAL
Qty: 10 | Refills: 0
Start: 2023-01-01 | End: 2023-01-01

## 2023-01-01 RX ORDER — MIDAZOLAM HYDROCHLORIDE 1 MG/ML
2 INJECTION, SOLUTION INTRAMUSCULAR; INTRAVENOUS ONCE
Refills: 0 | Status: DISCONTINUED | OUTPATIENT
Start: 2023-01-01 | End: 2023-01-01

## 2023-01-01 RX ORDER — DIAZEPAM 5 MG
1 TABLET ORAL
Refills: 0 | DISCHARGE

## 2023-01-01 RX ORDER — IBUPROFEN 800 MG/1
800 TABLET, FILM COATED ORAL
Qty: 30 | Refills: 0 | Status: ACTIVE | COMMUNITY
Start: 2023-01-01

## 2023-01-01 RX ORDER — DEXMEDETOMIDINE HYDROCHLORIDE IN 0.9% SODIUM CHLORIDE 4 UG/ML
0.1 INJECTION INTRAVENOUS
Qty: 400 | Refills: 0 | Status: DISCONTINUED | OUTPATIENT
Start: 2023-01-01 | End: 2023-01-01

## 2023-01-01 RX ORDER — IPRATROPIUM/ALBUTEROL SULFATE 18-103MCG
3 AEROSOL WITH ADAPTER (GRAM) INHALATION ONCE
Refills: 0 | Status: COMPLETED | OUTPATIENT
Start: 2023-01-01 | End: 2023-01-01

## 2023-01-01 RX ADMIN — Medication 3 MILLILITER(S): at 14:08

## 2023-01-01 RX ADMIN — Medication 3 MILLILITER(S): at 03:05

## 2023-01-01 RX ADMIN — CEFTRIAXONE 100 MILLIGRAM(S): 500 INJECTION, POWDER, FOR SOLUTION INTRAMUSCULAR; INTRAVENOUS at 07:50

## 2023-01-01 RX ADMIN — Medication 3 MILLILITER(S): at 03:13

## 2023-01-01 RX ADMIN — PIPERACILLIN AND TAZOBACTAM 25 GRAM(S): 4; .5 INJECTION, POWDER, LYOPHILIZED, FOR SOLUTION INTRAVENOUS at 16:00

## 2023-01-01 RX ADMIN — MORPHINE SULFATE 2 MILLIGRAM(S): 50 CAPSULE, EXTENDED RELEASE ORAL at 14:05

## 2023-01-01 RX ADMIN — Medication 3 MILLILITER(S): at 05:01

## 2023-01-01 RX ADMIN — Medication 3 MILLILITER(S): at 22:16

## 2023-01-01 RX ADMIN — CHLORHEXIDINE GLUCONATE 1 APPLICATION(S): 213 SOLUTION TOPICAL at 06:01

## 2023-01-01 RX ADMIN — CEFTRIAXONE 100 MILLIGRAM(S): 500 INJECTION, POWDER, FOR SOLUTION INTRAMUSCULAR; INTRAVENOUS at 18:16

## 2023-01-01 RX ADMIN — MORPHINE SULFATE 2 MILLIGRAM(S): 50 CAPSULE, EXTENDED RELEASE ORAL at 18:33

## 2023-01-01 RX ADMIN — Medication 1.25 MILLIGRAM(S): at 12:10

## 2023-01-01 RX ADMIN — Medication 40 MILLIGRAM(S): at 02:13

## 2023-01-01 RX ADMIN — HEPARIN SODIUM 5000 UNIT(S): 5000 INJECTION INTRAVENOUS; SUBCUTANEOUS at 14:08

## 2023-01-01 RX ADMIN — Medication 3 MILLILITER(S): at 16:50

## 2023-01-01 RX ADMIN — Medication 0.5 MILLIGRAM(S): at 08:58

## 2023-01-01 RX ADMIN — PIPERACILLIN AND TAZOBACTAM 200 GRAM(S): 4; .5 INJECTION, POWDER, LYOPHILIZED, FOR SOLUTION INTRAVENOUS at 12:34

## 2023-01-01 RX ADMIN — PANTOPRAZOLE SODIUM 40 MILLIGRAM(S): 20 TABLET, DELAYED RELEASE ORAL at 15:35

## 2023-01-01 RX ADMIN — Medication 3 MILLILITER(S): at 03:16

## 2023-01-01 RX ADMIN — CEFTRIAXONE 1000 MILLIGRAM(S): 500 INJECTION, POWDER, FOR SOLUTION INTRAMUSCULAR; INTRAVENOUS at 08:02

## 2023-01-01 RX ADMIN — Medication 3 MILLILITER(S): at 15:34

## 2023-01-01 RX ADMIN — CHLORHEXIDINE GLUCONATE 1 APPLICATION(S): 213 SOLUTION TOPICAL at 04:22

## 2023-01-01 RX ADMIN — Medication 3 MILLILITER(S): at 16:00

## 2023-01-01 RX ADMIN — Medication 2.5 MILLIGRAM(S): at 14:12

## 2023-01-01 RX ADMIN — HEPARIN SODIUM 5000 UNIT(S): 5000 INJECTION INTRAVENOUS; SUBCUTANEOUS at 22:34

## 2023-01-01 RX ADMIN — HEPARIN SODIUM 5000 UNIT(S): 5000 INJECTION INTRAVENOUS; SUBCUTANEOUS at 05:29

## 2023-01-01 RX ADMIN — Medication 1.25 MILLIGRAM(S): at 00:58

## 2023-01-01 RX ADMIN — Medication 1.25 MILLIGRAM(S): at 10:58

## 2023-01-01 RX ADMIN — Medication 40 MILLIGRAM(S): at 22:34

## 2023-01-01 RX ADMIN — Medication 3 MILLILITER(S): at 05:50

## 2023-01-01 RX ADMIN — Medication 3 MILLILITER(S): at 04:06

## 2023-01-01 RX ADMIN — Medication 3 MILLILITER(S): at 09:19

## 2023-01-01 RX ADMIN — Medication 40 MILLIGRAM(S): at 17:25

## 2023-01-01 RX ADMIN — PROPOFOL 0.24 MICROGRAM(S)/KG/MIN: 10 INJECTION, EMULSION INTRAVENOUS at 07:16

## 2023-01-01 RX ADMIN — PIPERACILLIN AND TAZOBACTAM 25 GRAM(S): 4; .5 INJECTION, POWDER, LYOPHILIZED, FOR SOLUTION INTRAVENOUS at 07:01

## 2023-01-01 RX ADMIN — SODIUM CHLORIDE 60 MILLILITER(S): 9 INJECTION, SOLUTION INTRAVENOUS at 18:51

## 2023-01-01 RX ADMIN — Medication 5 MILLIGRAM(S): at 18:49

## 2023-01-01 RX ADMIN — MORPHINE SULFATE 2 MILLIGRAM(S): 50 CAPSULE, EXTENDED RELEASE ORAL at 02:50

## 2023-01-01 RX ADMIN — Medication 0.5 MILLIGRAM(S): at 08:39

## 2023-01-01 RX ADMIN — Medication 3 MILLILITER(S): at 16:36

## 2023-01-01 RX ADMIN — MORPHINE SULFATE 2 MILLIGRAM(S): 50 CAPSULE, EXTENDED RELEASE ORAL at 02:37

## 2023-01-01 RX ADMIN — Medication 40 MILLIGRAM(S): at 21:53

## 2023-01-01 RX ADMIN — CHLORHEXIDINE GLUCONATE 15 MILLILITER(S): 213 SOLUTION TOPICAL at 18:58

## 2023-01-01 RX ADMIN — Medication 3 MILLILITER(S): at 09:39

## 2023-01-01 RX ADMIN — DEXMEDETOMIDINE HYDROCHLORIDE IN 0.9% SODIUM CHLORIDE 1.97 MICROGRAM(S)/KG/HR: 4 INJECTION INTRAVENOUS at 07:09

## 2023-01-01 RX ADMIN — Medication 3 MILLILITER(S): at 09:13

## 2023-01-01 RX ADMIN — Medication 5 MILLIGRAM(S): at 06:26

## 2023-01-01 RX ADMIN — Medication 1.25 MILLIGRAM(S): at 08:32

## 2023-01-01 RX ADMIN — Medication 3 MILLILITER(S): at 22:19

## 2023-01-01 RX ADMIN — Medication 2.5 MILLIGRAM(S): at 19:31

## 2023-01-01 RX ADMIN — PROPOFOL 0.24 MICROGRAM(S)/KG/MIN: 10 INJECTION, EMULSION INTRAVENOUS at 14:53

## 2023-01-01 RX ADMIN — Medication 150 GRAM(S): at 03:15

## 2023-01-01 RX ADMIN — HEPARIN SODIUM 5000 UNIT(S): 5000 INJECTION INTRAVENOUS; SUBCUTANEOUS at 06:01

## 2023-01-01 RX ADMIN — HEPARIN SODIUM 5000 UNIT(S): 5000 INJECTION INTRAVENOUS; SUBCUTANEOUS at 14:01

## 2023-01-01 RX ADMIN — Medication 40 MILLIGRAM(S): at 08:43

## 2023-01-01 RX ADMIN — Medication 5 MILLIGRAM(S): at 12:54

## 2023-01-01 RX ADMIN — ATENOLOL 50 MILLIGRAM(S): 25 TABLET ORAL at 11:17

## 2023-01-01 RX ADMIN — SODIUM CHLORIDE 1000 MILLILITER(S): 9 INJECTION, SOLUTION INTRAVENOUS at 05:56

## 2023-01-01 RX ADMIN — Medication 1.25 MILLIGRAM(S): at 06:09

## 2023-01-01 RX ADMIN — Medication 3 MILLILITER(S): at 15:49

## 2023-01-01 RX ADMIN — MIDAZOLAM HYDROCHLORIDE 2 MILLIGRAM(S): 1 INJECTION, SOLUTION INTRAMUSCULAR; INTRAVENOUS at 12:22

## 2023-01-01 RX ADMIN — CEFTRIAXONE 100 MILLIGRAM(S): 500 INJECTION, POWDER, FOR SOLUTION INTRAMUSCULAR; INTRAVENOUS at 06:41

## 2023-01-01 RX ADMIN — ETOMIDATE 20 MILLIGRAM(S): 2 INJECTION INTRAVENOUS at 06:54

## 2023-01-01 RX ADMIN — Medication 100 MILLIGRAM(S): at 06:53

## 2023-01-01 RX ADMIN — MORPHINE SULFATE 2 MILLIGRAM(S): 50 CAPSULE, EXTENDED RELEASE ORAL at 06:00

## 2023-01-01 RX ADMIN — Medication 3 MILLILITER(S): at 15:17

## 2023-01-01 RX ADMIN — Medication 20 MILLIGRAM(S): at 18:49

## 2023-01-01 RX ADMIN — Medication 40 MILLIGRAM(S): at 08:32

## 2023-01-01 RX ADMIN — Medication 30 MILLIGRAM(S): at 19:31

## 2023-01-01 RX ADMIN — CEFTRIAXONE 100 MILLIGRAM(S): 500 INJECTION, POWDER, FOR SOLUTION INTRAMUSCULAR; INTRAVENOUS at 18:44

## 2023-01-01 RX ADMIN — Medication 3 MILLILITER(S): at 12:11

## 2023-01-01 RX ADMIN — Medication 1.25 MILLIGRAM(S): at 09:17

## 2023-01-01 RX ADMIN — Medication 2 GRAM(S): at 05:01

## 2023-01-01 RX ADMIN — Medication 40 MILLIGRAM(S): at 09:34

## 2023-01-01 RX ADMIN — Medication 2.5 MILLIGRAM(S): at 01:29

## 2023-01-01 RX ADMIN — HEPARIN SODIUM 5000 UNIT(S): 5000 INJECTION INTRAVENOUS; SUBCUTANEOUS at 21:22

## 2023-01-01 RX ADMIN — HEPARIN SODIUM 5000 UNIT(S): 5000 INJECTION INTRAVENOUS; SUBCUTANEOUS at 21:05

## 2023-01-01 RX ADMIN — Medication 3 MILLILITER(S): at 06:15

## 2023-01-01 RX ADMIN — MORPHINE SULFATE 2 MILLIGRAM(S): 50 CAPSULE, EXTENDED RELEASE ORAL at 18:52

## 2023-01-01 RX ADMIN — Medication 1.25 MILLIGRAM(S): at 17:41

## 2023-01-01 RX ADMIN — MORPHINE SULFATE 2 MILLIGRAM(S): 50 CAPSULE, EXTENDED RELEASE ORAL at 05:45

## 2023-01-01 RX ADMIN — Medication 1000 MILLIGRAM(S): at 14:46

## 2023-01-01 RX ADMIN — HEPARIN SODIUM 5000 UNIT(S): 5000 INJECTION INTRAVENOUS; SUBCUTANEOUS at 05:28

## 2023-01-01 RX ADMIN — Medication 3 MILLILITER(S): at 03:10

## 2023-01-01 RX ADMIN — Medication 400 MILLIGRAM(S): at 14:01

## 2023-01-01 RX ADMIN — Medication 3 MILLILITER(S): at 10:38

## 2023-01-01 RX ADMIN — Medication 3 MILLILITER(S): at 20:37

## 2023-01-01 RX ADMIN — Medication 40 MILLIGRAM(S): at 18:44

## 2023-01-01 RX ADMIN — CEFTRIAXONE 100 MILLIGRAM(S): 500 INJECTION, POWDER, FOR SOLUTION INTRAMUSCULAR; INTRAVENOUS at 17:25

## 2023-01-01 RX ADMIN — SODIUM CHLORIDE 1000 MILLILITER(S): 9 INJECTION, SOLUTION INTRAVENOUS at 22:42

## 2023-01-01 RX ADMIN — Medication 40 MILLIGRAM(S): at 22:11

## 2023-01-01 RX ADMIN — Medication 3 MILLILITER(S): at 05:12

## 2023-01-01 RX ADMIN — MORPHINE SULFATE 4 MILLIGRAM(S): 50 CAPSULE, EXTENDED RELEASE ORAL at 12:23

## 2023-01-01 RX ADMIN — Medication 3 MILLILITER(S): at 12:12

## 2023-01-01 RX ADMIN — PROPOFOL 0.24 MICROGRAM(S)/KG/MIN: 10 INJECTION, EMULSION INTRAVENOUS at 05:57

## 2023-01-01 RX ADMIN — HEPARIN SODIUM 5000 UNIT(S): 5000 INJECTION INTRAVENOUS; SUBCUTANEOUS at 23:11

## 2023-01-01 RX ADMIN — ATENOLOL 50 MILLIGRAM(S): 25 TABLET ORAL at 22:03

## 2023-01-01 RX ADMIN — Medication 0.5 MILLIGRAM(S): at 04:52

## 2023-01-01 RX ADMIN — HEPARIN SODIUM 5000 UNIT(S): 5000 INJECTION INTRAVENOUS; SUBCUTANEOUS at 14:52

## 2023-01-01 RX ADMIN — Medication 40 MILLIGRAM(S): at 09:19

## 2023-01-01 RX ADMIN — Medication 3 MILLILITER(S): at 04:02

## 2023-01-01 RX ADMIN — Medication 3 MILLILITER(S): at 10:52

## 2023-01-01 RX ADMIN — Medication 2.5 MILLIGRAM(S): at 07:13

## 2023-01-01 RX ADMIN — SODIUM CHLORIDE 60 MILLILITER(S): 9 INJECTION, SOLUTION INTRAVENOUS at 15:03

## 2023-01-01 RX ADMIN — Medication 3 MILLILITER(S): at 22:03

## 2023-01-01 RX ADMIN — CEFTRIAXONE 100 MILLIGRAM(S): 500 INJECTION, POWDER, FOR SOLUTION INTRAMUSCULAR; INTRAVENOUS at 17:40

## 2023-01-01 RX ADMIN — Medication 3 MILLILITER(S): at 04:19

## 2023-01-01 RX ADMIN — Medication 0.5 MILLILITER(S): at 12:43

## 2023-01-01 RX ADMIN — Medication 3 MILLILITER(S): at 11:08

## 2023-01-01 RX ADMIN — PANTOPRAZOLE SODIUM 40 MILLIGRAM(S): 20 TABLET, DELAYED RELEASE ORAL at 12:39

## 2023-01-01 RX ADMIN — CHLORHEXIDINE GLUCONATE 15 MILLILITER(S): 213 SOLUTION TOPICAL at 05:29

## 2023-01-01 RX ADMIN — HEPARIN SODIUM 5000 UNIT(S): 5000 INJECTION INTRAVENOUS; SUBCUTANEOUS at 06:39

## 2023-01-01 RX ADMIN — Medication 30 MILLIGRAM(S): at 07:13

## 2023-01-01 RX ADMIN — Medication 3 MILLILITER(S): at 05:24

## 2023-01-01 RX ADMIN — Medication 3 MILLILITER(S): at 22:33

## 2023-01-01 RX ADMIN — LOSARTAN POTASSIUM 100 MILLIGRAM(S): 100 TABLET, FILM COATED ORAL at 11:08

## 2023-01-01 RX ADMIN — Medication 3 MILLILITER(S): at 12:13

## 2023-01-01 RX ADMIN — ATENOLOL 50 MILLIGRAM(S): 25 TABLET ORAL at 11:08

## 2023-01-01 RX ADMIN — Medication 40 MILLIGRAM(S): at 01:41

## 2023-01-01 RX ADMIN — MORPHINE SULFATE 2 MILLIGRAM(S): 50 CAPSULE, EXTENDED RELEASE ORAL at 21:20

## 2023-01-01 RX ADMIN — MORPHINE SULFATE 2 MILLIGRAM(S): 50 CAPSULE, EXTENDED RELEASE ORAL at 21:05

## 2023-01-01 RX ADMIN — Medication 40 MILLIGRAM(S): at 09:04

## 2023-01-01 RX ADMIN — HEPARIN SODIUM 5000 UNIT(S): 5000 INJECTION INTRAVENOUS; SUBCUTANEOUS at 15:30

## 2023-01-01 RX ADMIN — Medication 40 MILLIGRAM(S): at 08:57

## 2023-01-01 RX ADMIN — SODIUM CHLORIDE 1000 MILLILITER(S): 9 INJECTION INTRAMUSCULAR; INTRAVENOUS; SUBCUTANEOUS at 07:29

## 2023-01-01 RX ADMIN — Medication 166.67 MILLIGRAM(S): at 13:53

## 2023-01-01 RX ADMIN — Medication 20 MILLIGRAM(S): at 06:26

## 2023-01-01 RX ADMIN — Medication 3 MILLILITER(S): at 21:02

## 2023-01-01 RX ADMIN — Medication 5 MILLIGRAM(S): at 00:37

## 2023-01-01 RX ADMIN — SODIUM CHLORIDE 50 MILLILITER(S): 9 INJECTION, SOLUTION INTRAVENOUS at 14:08

## 2023-01-01 RX ADMIN — SODIUM CHLORIDE 50 MILLILITER(S): 9 INJECTION, SOLUTION INTRAVENOUS at 05:41

## 2023-01-01 RX ADMIN — CHLORHEXIDINE GLUCONATE 1 APPLICATION(S): 213 SOLUTION TOPICAL at 05:29

## 2023-01-01 RX ADMIN — Medication 3 MILLILITER(S): at 09:18

## 2023-01-01 RX ADMIN — Medication 3 MILLILITER(S): at 09:04

## 2023-01-01 RX ADMIN — AZITHROMYCIN 255 MILLIGRAM(S): 500 TABLET, FILM COATED ORAL at 08:02

## 2023-01-01 RX ADMIN — Medication 1 MILLIGRAM(S): at 09:40

## 2023-01-01 RX ADMIN — Medication 3 MILLILITER(S): at 22:10

## 2023-01-01 RX ADMIN — Medication 3 MILLILITER(S): at 21:54

## 2023-01-01 RX ADMIN — CEFTRIAXONE 100 MILLIGRAM(S): 500 INJECTION, POWDER, FOR SOLUTION INTRAMUSCULAR; INTRAVENOUS at 17:51

## 2023-01-01 RX ADMIN — PIPERACILLIN AND TAZOBACTAM 25 GRAM(S): 4; .5 INJECTION, POWDER, LYOPHILIZED, FOR SOLUTION INTRAVENOUS at 23:12

## 2023-01-01 RX ADMIN — Medication 125 MILLIGRAM(S): at 03:15

## 2023-01-01 RX ADMIN — CEFTRIAXONE 100 MILLIGRAM(S): 500 INJECTION, POWDER, FOR SOLUTION INTRAMUSCULAR; INTRAVENOUS at 17:41

## 2023-01-01 RX ADMIN — SODIUM CHLORIDE 500 MILLILITER(S): 9 INJECTION, SOLUTION INTRAVENOUS at 02:10

## 2023-07-07 PROBLEM — R49.8 IMPAIRED VOCAL QUALITY: Status: ACTIVE | Noted: 2023-01-01

## 2023-07-07 PROBLEM — R13.14 DYSPHAGIA, PHARYNGOESOPHAGEAL: Status: ACTIVE | Noted: 2023-01-01

## 2023-07-07 PROBLEM — T17.928A ASPIRATION OF FOOD, INITIAL ENCOUNTER: Status: ACTIVE | Noted: 2023-01-01

## 2023-07-07 NOTE — HISTORY OF PRESENT ILLNESS
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  He saw his internist who said it may be related to his lungs and he saw his pulmonologist who prescribed him an inhaler, which helps. He had a CXR and was told it was nl (no results available). He has instances of dysphagia. He denies odynophagia, otalgia. He has episodes of aspiration, the last one was 2 months ago and was around the time of when hoarseness started. He reports this also started after a viral syndrome. He is also c/o chest congestion and saw his pulmonologist. Has h/o intracranial aneurysm, being followed by neurosurgery. Nonsmoker. No FH or SH pertinent to cc.

## 2023-07-07 NOTE — ASSESSMENT
[FreeTextEntry1] : r vocal cord parslysiis, mostly compensated, with hoarseness, occ dysphagia and aspiration when it began\par explained to pt\par -MRI brain\par -MRI neck\par -modified barium swallow\par -advised to stay well hydrated, eat thickened foods\par -speech and swallowing therapy ordered- brought to assistant to help arrange this \par RTC with MRI brain, neck, modified barium swallow to review findings

## 2023-07-07 NOTE — PHYSICAL EXAM
[] : septum deviated to the right [Midline] : trachea located in midline position [Laryngoscopy Performed] : laryngoscopy was performed, see procedure section for findings [Normal] : extraocular movements are normal [de-identified] : gait steady

## 2023-07-07 NOTE — PROCEDURE
[Hoarseness] : hoarseness not clearly evaluated by indirect laryngoscopy [Dysphagia] : dysphagia not clearly evaluated by indirect laryngoscopy [Topical Lidocaine] : topical lidocaine [Oxymetazoline HCl] : oxymetazoline HCl [Flexible Endoscope] : examined with the flexible endoscope [Normal] : posterior cricoid area had healthy pink mucosa in the interarytenoid area and the esophageal inlet [Aspiration] : aspiration not clearly evaluated by indirect laryngoscopy [Serial Number: ___] : Serial Number: [unfilled] [True Vocal Cords Unilateral Paralysis] : true vocal cord paralysis on the right [de-identified] : done for hoarseness, dysphagia, aspiration\par found to have r vc paralysis - minimal posterior motion

## 2023-08-08 NOTE — CONSULT NOTE ADULT - ASSESSMENT
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     RECOMMENDATIONS:  - CT of skull base to aortic arch with contrast   - Follow up outpatient with Dr. bradshaw for vocal cord injection   - SLP evaluation outpatient   ---  Thank you for the kind referral and for allowing me to share in the care of RAMANDEEP CALDERA If you have any questions, please do not hesitate to contact me.     Sincerely,  Suzanne Alexander PA-C  08-08-23 @ 10:29    
Assessment:     89y Male with past medical history significant for intracranial aneurysm, hearing loss, HTN and arthritis. Presented to ED w/ complaints of 3mo of progressive hoarseness and intermittent episodes of dysphagia. Patient called primary doctor about swallowing concerns and they instructed him to come to ED for further evaluation. ENT consulted on arrival. Bedside scope with evidence of right vocal fold paresis vs paralysis with small glottic gap, compensating left cord, trachea patent, no airway concerns, minimal thick secretions noted post cricoid; recommended f/up outpatient for vocal cord injection. CT chest with mass noted in the upper thoracic esophagus suspicious for neoplasm and thoracic lymphadenopathy. Given study results thoracic surgery has been asked to see patient for further evaluation. Patient seen sitting on stretcher. Says he is able to tolerate PO. Admits to progressive weight loss, hoarseness and intermittent episodes of difficulty swallowing. ED provider discussed CT findings with patient. He has a disabled wife at home and is concerned about making arrangements for her.       Plan:  Problem 1:  Esophageal mass concerning for malignancy  - CT chest done in ED with mass noted in the upper thoracic esophagus and thoracic lymphadenopathy  - thoracic surgery asked to see patient for further evaluation  -defer decision for inpatient oncology w/up vs outpatient f/up to ED/medicine teams; no need to f/up w/ thoracic surgery at this time; please re-consult if needed     Problem 2:  Dysphagia // Vocal Cord Paralysis   - ENT consulted; bedside scope with right vocal fold paresis vs paralysis with small glottic gap, compensating left cord, trachea patent, no airway concerns, minimal thick secretions noted post cricoid; recc outpatient f/up w/ Dr. Delcid for vocal cord injection; SLP camime     Problem 3:  HTN  - currently normotensive   - defer mgmt to primary team     Problem 4:  BL hearing loss    I have reviewed clinical labs tests and reports, radiology tests and reports, as well as old patient medical records, and discussed with the referring physician.

## 2023-08-08 NOTE — ED PROVIDER NOTE - CARE PLAN
1 Principal Discharge DX:	Voice hoarseness   Principal Discharge DX:	Voice hoarseness  Secondary Diagnosis:	Esophageal mass

## 2023-08-08 NOTE — ED PROVIDER NOTE - NSFOLLOWUPINSTRUCTIONS_ED_ALL_ED_FT
you were found to have an esophageal mass highly concerning for neoplasm (cancer) - you need to follow up with oncology for full work up and evaluation of this   please return to ED for any worsening or concerning symptoms

## 2023-08-08 NOTE — ED PROVIDER NOTE - PATIENT PORTAL LINK FT
You can access the FollowMyHealth Patient Portal offered by [facility] by registering at the following website: [email or web address]. By joining [employer]’s FollowMyHealth portal, you will also be able to view your health information using other applications (apps) compatible with our system.

## 2023-08-08 NOTE — ED PROVIDER NOTE - ATTENDING APP SHARED VISIT CONTRIBUTION OF CARE
89 M followed by ENT for evaluation of choking intermittent for months, and had episode of choking and vomiting up food last Friday.  Since then has challenges with passage of food.  Reviewed prior notes by ENT and noted recs for thick diet, MRI and barium swallow being scheduled as outpt.  No chest pain or sob.  VSS but HTN.  Pt swallowing well on exam.  No drooling or stridor.  Belly soft nontender.  BL pulses are nl, equal and symmetric.  Clear lungs.  EKG LBBB (no prior) but patient does not have any anginal equivalent symptoms.  PO challenge passed in ED.  Discussed with ENT who communicated with patient to go to office, not ER for which patient seems to be confused about.  Given clinical picture no indication for emergent imaging or labs.  Do not suspect acute cause, stroke, acs, dissection, aspiration.  Plan continue outpt fu and workup and tx.

## 2023-08-08 NOTE — SWALLOW BEDSIDE ASSESSMENT ADULT - SLP PERTINENT HISTORY OF CURRENT PROBLEM
89 y/o M with PMHx of aneurysm last seen in 2016 with history of R mixed hearing loss and L HF SNHL who presented to [facility] on 8/8/23 with hoarseness for the past 3 months. He reported hx of coughing with oral intake and c/o chest congestion. Nonsmoker. Pt is pending outpatient imaging from Dr. Mcguire.  Pt was seen by ENT today. Laryngoscopy completed revealed Right vocal fold paresis vs paralysis with small glottic gap, compensating left cord, trachea patent, no airway concerns, minimal thick secretions noted post cricoid. CT imaging completed to r/o malignancy.

## 2023-08-08 NOTE — ED ADULT NURSE NOTE - NSFALLHARMRISKINTERV_ED_ALL_ED

## 2023-08-08 NOTE — SWALLOW BEDSIDE ASSESSMENT ADULT - COMMENTS
CT Neck 8/8/23: A 3.9 cm proximal esophagus mass is most concerning for malignancy. There is a right paratracheal node adjacent to it with gross extranodal spread invading the thyroid gland and possibly the recurrent laryngeal nerve. Correlate for a right vocal palsy. 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 Please refer to chest CT for complete detail thereof.    CT chest 8/8/23: Mass in the upper thoracic esophagus suspicious for neoplasm. Thoracic lymphadenopathy as above, likely metastatic. Nonspecific right adrenal nodule. Questionable lesion in the upper pole of the right kidney. Consider follow-up MRI.    CTH 8/8/23: No acute intracranial hemorrhage, mass effect or other acute finding.      Per patient report: He reported baseline cough for the last few months, though noted cough with liquids and solids within the last week. He endorsed voice changes as of the last 3 months. He reported an 18lbs unintentional weight loss in the last 2 years due to reduced appetite. Denied recent hx of PNA.

## 2023-08-08 NOTE — ED PROVIDER NOTE - HOW PATIENT ADDRESSED, PROFILE
All other prescriptive medications can be taken as usual the morning of your procedure.     Please call the office with any major changes to your health if it could affect your upcoming procedure.    We understand circumstances happen; if you need to reschedule your procedure, we would appreciate a 5-day notice if possible, so we have the opportunity to adjust the schedule. -Thank you     GATORADE-COLONOSCOPY PREP    No alcohol or illegal drugs 48 hours prior to the procedure.    One Week Prior To Procedure: August 11th    · Purchase 64 ounces of Gatorade (two 32 oz bottles). No red or purple  · Purchase two bottles of Miralax (238 grams each).  · Purchase 3 Dulcolax laxative tablets (over the counter)  · Avoid seeds, popcorn and nuts for 1 week prior to procedure  · Stop Iron Supplements; including Multivitamins    Day Prior To Procedure August 17th    · Breakfast: You can have two eggs and two slices of toast with butter/margarine OR two slices of toast with butter/margarine and a cup of cottage cheese.   · Lunch: One cup of macaroni and cheese OR one cup of white rice and 8oz vanilla yogurt. Thereafter continue to consume full liquid diet which includes such things as white milk, plain vanilla ice cream, plain vanilla pudding, plain vanilla yogurt,  Ensure/Boost UNTIL 6 PM.  May have  clear liquid diet as stated below all day UNTIL MIDNIGHT.   · Clear liquids allowed: water, clear fruit juice (white grape or apple), broth (without noodles), Jell-O (without fruit), soda, Andrew-Aid, black coffee and tea.  · AVOID any red or purple colored products  · AVOID anything with fruit, vegetables, nuts and seeds.  · Mix the entire contents of 1 Bottle of Miralax into 64 ounces of Gatorade and refrigerate.  · At 12:00 pm take the 3 Dulcolax tablets with 12 ounces of clear liquids  · At 4:00 pm drink the Gatorade mixture at a rate of 8 oz every 10 to 15 minutes, until completing half of the mixture. (Refrigerate the other half  of the mixture)  · Drink at least 5 to 6, 8-ounce glasses of clear liquid before bed.  · Nothing to eat or drink after midnight, except the remaining Gatorade mixture.    Day Of The Procedure August 18th  · Drink the remaining Gatorade mixture starting 5 hours before the procedure (6:00am to 8:00am). Followed by 4 to 5 glasses of water. NO liquids after 8am  · If stools are not clear yellow liquid or formed stool is present, start second bottle of Miralax; 2 scoops in 8 oz of water every 15 to 20 minutes until stools are clear. If stools are still not clear, call Same Day Surgery at [phone] for further instructions.  · Prescribed medication should be taken as instructed.    Arrive at the hospital 1 hour prior to scheduled procedure. Be there at  10:00am.    Hospital: [facility] - Same Day Surgery 2nd Floor.     Date August 18, 2021    Time  11:00am    Arrange for transportation home following procedure, you cannot drive.  No buses or cabs are allowed.    Reminder: Please check with your Insurance Company to see if this procedure is covered.    Any questions about this prep please call our office at [phone]. Radha    Your procedure will be performed by Gastroenterologist: Ramy William MD.    Patient Education     Colonoscopy     A camera attached to a flexible tube with a viewing lens is used to take video pictures.   Colonoscopy is a test to view the inside of your lower digestive tract (colon and rectum). Sometimes it can show the last part of the small intestine (ileum). During the test, small pieces of tissue may be removed for testing. This is called a biopsy. Small growths, such as polyps, may also be removed.   Why is colonoscopy done?  The test is done to help look for colon cancer. And it can help find the source of abdominal pain, bleeding, and changes in bowel habits. It may be needed once a year to every 10 years, depending on factors such as  your:  · Age  · Health history  · Family health history  · Symptoms  · Results from any prior colonoscopy  Risks and possible complications  These include:  · Bleeding               · A puncture or tear in the colon   · Risks of anesthesia  · A cancer lesion not being seen or fully removed  Getting ready   To prepare for the test:  · Talk with your healthcare provider about the risks of the test (see below). Also ask your healthcare provider about alternatives to the test.  · Tell your healthcare provider about any medicines and supplements you take. Also tell him or her about any health conditions you may have.  · Make sure your rectum and colon are empty for the test. Follow the diet and bowel prep instructions exactly. If you don’t, the test may need to be rescheduled.  · Plan for a friend or family member to drive you home after the test.     Colonoscopy provides an inside view of the entire colon.     You may discuss the results with your doctor right away or at a future visit.  During the test   The test is usually done in the hospital on an outpatient basis or at an outpatient clinic. This means you go home the same day. The procedure takes about 30 minutes. During that time:  · You are given relaxing (sedating) medicine through an IV line. You may be drowsy, or fully asleep.  · The healthcare provider will first give you a physical exam to check for anal and rectal problems.  · Then the anus is lubricated and the scope inserted.  · If you are awake, you may have a feeling similar to needing to have a bowel movement. You may also feel pressure as air is pumped into the colon. It’s OK to pass gas during the procedure.  · Biopsy, polyp removal, or other treatments may be done during the test.  After the test   You may have gas right after the test. It can help to try to pass it to help prevent later bloating. Your healthcare provider may discuss the results with you right away. Or you may need to schedule a  follow-up visit to talk about the results. After the test, you can go back to your normal eating and other activities. You may be tired from the sedation and need to rest for a few hours. Discuss your medicines with your provider to understand if they can be restarted right away.  [employer] last reviewed this educational content on 6/1/2019 © 2000-2020 [employer]. [address] [place] [address], [place] [address]. All rights reserved. This information is not intended as a substitute for professional medical care. Always follow your healthcare professional's instructions.              Carlos Frances

## 2023-08-08 NOTE — CONSULT NOTE ADULT - NS ATTEND AMEND GEN_ALL_CORE FT
I agree with the history, physical exam, laryngoscopy, assessment and plan as outlined above.      At this time I am recommending CT neck from aortic arch to skull base fine cuts through the larynx following the recurrent laryngeal nerve  I am also recommending chest x-ray to rule out any aspiration pneumonia  Recommending consultation with speech pathology    We will follow-up imaging and discuss neck steps

## 2023-08-08 NOTE — ED ADULT TRIAGE NOTE - OTHER COMPLAINTS
History of HTN, Brain aneurysm. Patient speaking in full sentences. No drooling noted. Patient with an order for MRI for vocal cord paresis. EKG done.

## 2023-08-08 NOTE — CONSULT NOTE ADULT - SUBJECTIVE AND OBJECTIVE BOX
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 He saw his internist who said it may be related to his lungs and he saw his pulmonologist who prescribed him an inhaler, which helps. He had a CXR and was told it was nl (no results available). He has instances of dysphagia. He denies odynophagia, otalgia. He has episodes of aspiration, the last one was 2 months ago and was around the time of when hoarseness started. He reports this also started after a viral syndrome. He is also c/o chest congestion and saw his pulmonologist. Has h/o intracranial aneurysm, being followed by neurosurgery. Nonsmoker. Patient stated he drank a lot of water yesterday that followed with a long coughing fit and was concerned about his ability to swallow. Patient states he called the doctor office and told him to go to the ED. He states he has not worked with SLP and is pending outpatient imaging from Dr. Mcguire.  Patient denies any SOB, difficulty breathing, and sates he is able to swallow soft foods.       Past Medical History    Unreviewed Unverified: History of aneurysm (V12.59) (Z86.79)    Arthritis (716.90) (M19.90)  History of hypertension (V12.59) (Z86.79)  Hypertension (401.9) (I10)    Surgical History    History of Cataract Surgery  History of Elbow Surgery    Family History    Unreviewed Unverified: Family history of Voice disorder    Family history of cerebrovascular accident (CVA) (V17.1) (Z82.3) : Sibling  Family history of deafness or hearing loss (V19.2) (Z82.2) : Mother, Father  Family history of hypertension (V17.49) (Z82.49) : Mother, Father  Family history of migraine headaches (V17.2) (Z82.0) : Child  Family history of Seizure : Sibling    Current medications   Alphagan P 0.1 % Ophthalmic Solution  Aspirin 81 MG Oral Tablet Delayed Release  Atenolol 50 MG Oral Tablet  Atorvastatin Calcium 20 MG Oral Tablet  Cialis 5 MG Oral Tablet  Dorzolamide HCl-Timolol Mal 22.3-6.8 MG/ML Ophthalmic Solution  Fluticasone Propionate 50 MCG/ACT Nasal Suspension; USE 2 SPRAYS IN EACH  NOSTRIL ONCE DAILY  Lastacaft 0.25 % Ophthalmic Solution  Latanoprost 0.005 % Ophthalmic Solution  Lipitor 10 MG Oral Tablet  Losartan Potassium 100 MG Oral Tablet  Norvasc 10 MG Oral Tablet  Pilocarpine HCl - 4 % Ophthalmic Solution\    ALLERGIES:  No Known Allergies        REVIEW OF SYSTEMS: The patient was asked and responded to a review of systems regarding the following symptoms: constitutional, eye, ears, nose, mouth, throat, cardiovascular, respiratory. Pertinent factors have been included in the HPI.     PHYSICAL EXAMINATION:    The pertinent positive and negative examination findings were:    Constitutional:  in no acute distress, talking in short sentences with hoarse and breathy voice   Neck: no mass and no adenopathy . parotid gland, submandibular gland and thyroid glands are normal. temporomandibular joint is normal.   Ear: external ears are normal bilaterally   Nasal:  external appearance is normal, inferior turbinates and middle turbinates are normal and no abnormal secretions . septum deviated to the right.   Oral Cavity: tongue is normal, teeth are normal, gums are normal, palatine tonsils are normal, lingual tonsils are normal, mucosa is normal   Larynx: laryngoscopy was performed, see procedure section for findings.   Head/Face: no masses and lesions seen, face is symmetric.   Eyes: ocular motility and gaze are normal and extraocular movements are normal.   Respiratory: Regular respirations with no stridor     Vital Signs:  T(C): 36.7 (23 @ 09:00), Max: 36.7 (23 @ 09:00)  HR: 61 (23 @ 09:00) (61 - 69)  BP: 175/75 (23 @ 09:00) (175/75 - 178/77)  RR: 17 (23 @ 09:00) (17 - 18)  SpO2: 96% (23 @ 09:00) (96% - 96%)           PROCEDURE NOTE:PROCEDURE NOTE:    Procedure: Flexible laryngoscopy (CPT 32155)     Pre-Procedure Diagnosis: dysphagia     Post-Procedure Diagnosis: right vocal cord paresis       Indications for Procedure: RAMANDEEP CALDERA is a89y  Male who presents with a history of dysphagia and aspiration . See above full HPI for further details. A detailed assessment of the nasal cavity, nasopharynx, hypopharynx, oropharynx, and larynx was required. An indirect mirror examination was not sufficient for complete evaluation due to patient discomfort or incomplete view. Therefore flexible laryngoscopy was performed.       Description of Procedure: After obtaining verbal consent, a flexible fiberoptic laryngoscope was inserted into the right nasal cavity. The nasal anatomy was examined for evidence of  nasal cavity obstruction. The septum was examined for clinically significant deviation.  Passing the flexible scope into the oropharynx and hypopharynx allowed examination of the base of tongue and vallecula and epiglottis. The piriform sinuses were examined for lesions and pooling of secretions or visible aspiration. The false vocal cords and true vocal cords were examined. The true vocal cords were examined for mobility, symmetry and closure. The visualized portion of the subglottis examined. The pharynx was examined for  visible extrinsic compression. The patient tolerated the procedure well without complications.      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 He saw his internist who said it may be related to his lungs and he saw his pulmonologist who prescribed him an inhaler, which helps. He had a CXR and was told it was nl (no results available). He has instances of dysphagia. He denies odynophagia, otalgia. He has episodes of aspiration, the last one was 2 months ago and was around the time of when hoarseness started. He reports this also started after a viral syndrome. He is also c/o chest congestion and saw his pulmonologist. Has h/o intracranial aneurysm, being followed by neurosurgery. Nonsmoker. Patient stated he drank a lot of water yesterday that followed with a long coughing fit and was concerned about his ability to swallow. Patient states he called the doctor office and told him to go to the ED. He states he has not worked with SLP and is pending outpatient imaging from Dr. Mcguire.  Patient denies any SOB, difficulty breathing, and sates he is able to swallow soft foods.       Past Medical History    Unreviewed Unverified: History of aneurysm (V12.59) (Z86.79)    Arthritis (716.90) (M19.90)  History of hypertension (V12.59) (Z86.79)  Hypertension (401.9) (I10)    Surgical History    History of Cataract Surgery  History of Elbow Surgery    Family History    Unreviewed Unverified: Family history of Voice disorder    Family history of cerebrovascular accident (CVA) (V17.1) (Z82.3) : Sibling  Family history of deafness or hearing loss (V19.2) (Z82.2) : Mother, Father  Family history of hypertension (V17.49) (Z82.49) : Mother, Father  Family history of migraine headaches (V17.2) (Z82.0) : Child  Family history of Seizure : Sibling    Current medications   Alphagan P 0.1 % Ophthalmic Solution  Aspirin 81 MG Oral Tablet Delayed Release  Atenolol 50 MG Oral Tablet  Atorvastatin Calcium 20 MG Oral Tablet  Cialis 5 MG Oral Tablet  Dorzolamide HCl-Timolol Mal 22.3-6.8 MG/ML Ophthalmic Solution  Fluticasone Propionate 50 MCG/ACT Nasal Suspension; USE 2 SPRAYS IN EACH  NOSTRIL ONCE DAILY  Lastacaft 0.25 % Ophthalmic Solution  Latanoprost 0.005 % Ophthalmic Solution  Lipitor 10 MG Oral Tablet  Losartan Potassium 100 MG Oral Tablet  Norvasc 10 MG Oral Tablet  Pilocarpine HCl - 4 % Ophthalmic Solution\    ALLERGIES:  No Known Allergies        REVIEW OF SYSTEMS: The patient was asked and responded to a review of systems regarding the following symptoms: constitutional, eye, ears, nose, mouth, throat, cardiovascular, respiratory. Pertinent factors have been included in the HPI.     PHYSICAL EXAMINATION:    The pertinent positive and negative examination findings were:    Constitutional:  in no acute distress, talking in short sentences with hoarse and breathy voice   Neck: no mass and no adenopathy . parotid gland, submandibular gland and thyroid glands are normal. temporomandibular joint is normal.   Ear: external ears are normal bilaterally   Nasal:  external appearance is normal, inferior turbinates and middle turbinates are normal and no abnormal secretions . septum deviated to the right.   Oral Cavity: tongue is normal, teeth are normal, gums are normal, palatine tonsils are normal, lingual tonsils are normal, mucosa is normal   Larynx: laryngoscopy was performed, see procedure section for findings.   Head/Face: no masses and lesions seen, face is symmetric.   Eyes: ocular motility and gaze are normal and extraocular movements are normal.   Respiratory: Regular respirations with no stridor     Vital Signs:  T(C): 36.7 (23 @ 09:00), Max: 36.7 (23 @ 09:00)  HR: 61 (23 @ 09:00) (61 - 69)  BP: 175/75 (23 @ 09:00) (175/75 - 178/77)  RR: 17 (23 @ 09:00) (17 - 18)  SpO2: 96% (23 @ 09:00) (96% - 96%)           PROCEDURE NOTE:PROCEDURE NOTE:    Procedure: Flexible laryngoscopy (CPT 81894)     Pre-Procedure Diagnosis: dysphagia     Post-Procedure Diagnosis: right vocal cord paralysis, incomplete abduction on left, airway does remain patent      Indications for Procedure: RAMANDEEP CALDERA is a89y  Male who presents with a history of dysphagia and aspiration . See above full HPI for further details. A detailed assessment of the nasal cavity, nasopharynx, hypopharynx, oropharynx, and larynx was required. An indirect mirror examination was not sufficient for complete evaluation due to patient discomfort or incomplete view. Therefore flexible laryngoscopy was performed.       Description of Procedure: After obtaining verbal consent, a flexible fiberoptic laryngoscope was inserted into the right nasal cavity. The nasal anatomy was examined for evidence of  nasal cavity obstruction. The septum was examined for clinically significant deviation.  Passing the flexible scope into the oropharynx and hypopharynx allowed examination of the base of tongue and vallecula and epiglottis. The piriform sinuses were examined for lesions and pooling of secretions or visible aspiration. The false vocal cords and true vocal cords were examined. The true vocal cords were examined for mobility, symmetry and closure. The visualized portion of the subglottis examined. The pharynx was examined for  visible extrinsic compression. The patient tolerated the procedure well without complications.      Findings: Right vocal fold paresis vs paralysis with small glottic gap, compensating left cord, trachea patent, no airway concerns, minimal thick secretions noted post cricoid

## 2023-08-08 NOTE — SWALLOW BEDSIDE ASSESSMENT ADULT - SWALLOW EVAL: DIAGNOSIS
Brief assessment completed in the ED and limited to thin liquids. MBSS order placed by medical team. Delayed clinical indicators of penetration/aspiration noted with thin liquids. Moderate to severe dysphonia marked by soft, breathy and rough vocal quality noted. No dysarthria noted. Oral motor exam WNL. Given complaints, clinical presentation, and work up thus far, agree with modified barium swallow study to further assess swallowing physiology. Dysphagia intervention TBD pending objective assessment results.

## 2023-08-08 NOTE — SWALLOW BEDSIDE ASSESSMENT ADULT - PHARYNGEAL PHASE
Seemingly timely swallow, hyolaryngeal movement appreciated, with delayed clinical indicators of penetration/aspiration noted (weak cough noted).

## 2023-08-08 NOTE — SWALLOW BEDSIDE ASSESSMENT ADULT - SLP GENERAL OBSERVATIONS
Pt was seen fully awake and alert, sitting fully upright at the edge of bed, on room air. A&Ox3, followed simple directives, and communicated wants/needs. Motor speech exam revealed regular pattern, precise speech, and normal rate. No dysarthria. Moderate to severe dysphonia marked by soft, breathy, and rough vocal quality c/w R VC paralysis. Sustained phonation <15 seconds (3 seconds).

## 2023-08-08 NOTE — ED PROVIDER NOTE - CLINICAL SUMMARY MEDICAL DECISION MAKING FREE TEXT BOX
pt c/o hoarseness and dysphagia x 5d after chocking episode -- admits to hoarseness being a chronic problem (upon reviewing ent records- partial cord paralysis and hoarseness, scheduled for outpatient barium and mri), pt states inability to tolerate po -- however given water and observed to drink it w/o any difficulty - did not aspirate, no emesis. slightly hypertensive but has not taken his meds this am, ent pt c/o hoarseness and dysphagia x 5d after chocking episode -- admits to hoarseness being a chronic problem (upon reviewing ent records- partial cord paralysis and hoarseness, scheduled for outpatient barium and mri), pt states inability to tolerate po -- however given water and observed to drink it w/o any difficulty - did not aspirate, no emesis. slightly hypertensive but has not taken his meds this am, ent scoped and partial cord paralysis confirmed - recommending imaging - pt found to have esophageal mass and mets / lymph nodes, ct surg consulted as per ent's request - ct surg recommending oncology w/u - either inpatient or outpatient - admission offered to pt but her prefers to go home and work cancer up as outpatient, pt encouraged to return for any worsening or concerning symptoms or if he changes his mind and decides to get this done as inpatient, pt w/full decision making capacity and expressed understanding of dx

## 2023-08-08 NOTE — ED PROVIDER NOTE - ENMT, MLM
Airway patent, Nasal mucosa clear. Mouth with poor dentition, multiple missing teeth. Throat has no vesicles, no oropharyngeal exudates and uvula is midline. no stridor, no cervical lymphadenopathy b/l

## 2023-08-08 NOTE — ED PROVIDER NOTE - OBJECTIVE STATEMENT
The pt is a 89 y/o M, who presents to ED c/o hoarse voice and inability to tolerate po x 5 d after chocking episode. Pt w/chronic hoarseness and being seen by ENT (dr arevalo), states is unable to keep anything po down. Denies cp, sob, n/v/d, abd pain, h/a, dizziness.

## 2023-08-08 NOTE — CONSULT NOTE ADULT - SUBJECTIVE AND OBJECTIVE BOX
Surgeon: Dr. Dorado     Requesting Physician: ED Provider     HISTORY OF PRESENT ILLNESS:  89y Male with past medical history significant for intracranial aneurysm, hearing loss, HTN and arthritis. Presented to ED w/ complaints of 3mo of progressive hoarseness and intermittent episodes of dysphagia. Patient called primary doctor over swallowing concerns and they instructed him to come to ED for further evaluation. ENT consulted on arrival.  ENT bedside scope with evidence of right vocal fold paresis vs paralysis with small glottic gap, compensating left cord, trachea patent, no airway concerns, minimal thick secretions noted post cricoid; recommended f/up outpatient for vocal cord injection. CT chest with mass in the upper thoracic esophagus suspicious for neoplasm and thoracic lymphadenopathy. Given study results thoracic surgery has been asked to see patient for further evaluation. Patient seen sitting on stretcher. Says he is able to tolerate PO. Admits to progressive weight loss.     PAST MEDICAL & SURGICAL HISTORY:  HTN  Hx of Aneurysm   Arthritis     MEDICATIONS:  Alphagan P 0.1 % Ophthalmic Solution  Aspirin 81 MG Oral Tablet Delayed Release  Atenolol 50 MG Oral Tablet  Atorvastatin Calcium 20 MG Oral Tablet  Cialis 5 MG Oral Tablet  Dorzolamide HCl-Timolol Mal 22.3-6.8 MG/ML Ophthalmic Solution  Fluticasone Propionate 50 MCG/ACT Nasal Suspension; USE 2 SPRAYS IN EACH  NOSTRIL ONCE DAILY  Lastacaft 0.25 % Ophthalmic Solution  Latanoprost 0.005 % Ophthalmic Solution  Lipitor 10 MG Oral Tablet  Losartan Potassium 100 MG Oral Tablet  Norvasc 10 MG Oral Tablet  Pilocarpine HCl - 4 % Ophthalmic Solution\    Allergies  No Known Allergies    SOCIAL HISTORY:  Smoker: No  ETOH use:  No  Ilicit Drug use:  No  Assisted device use: cane   Live with: wife     Review of Systems:  CONSTITUTIONAL: + weight loss, + fatigue; denies fevers / chills, sweats                            NEURO:  Denies seizures, syncope, confusion                                                                                  EYES:  Denies blurry vision, discharge, pain, loss of vision                                                                                    ENT:  + dysphagia, Denies difficulty hearing, vertigo                         CV:  Denies chest pain, palpitations, HUNTER, orthopnea                                                                                           RESPIRATORY:  Denies Wheezing, SOB, cough                                                          GI:  Denies nausea, vomiting, diarrhea                                                                      : Denies hematuria, dysuria, urgency, incontinence                                                                                          MUSCULOSKELETAL  + arthritis                                                            SKIN/BREAST:  Denies rash, itching, hair loss, masses                                                                                              PSYCH:  Denies depression, anxiety, suicidal ideation                                                                                                                              ENDOCRINE:  Denies cold intolerance, heat intolerance, polydipsia                                                                      Vital Signs Last 24 Hrs  T(C): 36.7 (08 Aug 2023 09:00), Max: 36.7 (08 Aug 2023 09:00)  T(F): 98.1 (08 Aug 2023 09:00), Max: 98.1 (08 Aug 2023 09:00)  HR: 61 (08 Aug 2023 09:00) (61 - 69)  BP: 175/75 (08 Aug 2023 09:00) (175/75 - 178/77)  BP(mean): --  RR: 17 (08 Aug 2023 09:00) (17 - 18)  SpO2: 96% (08 Aug 2023 09:00) (96% - 96%)    Parameters below as of 08 Aug 2023 09:00  Patient On (Oxygen Delivery Method): room air    Physical Exam:  General: elderly appearing male in NAD, hoarse with conversation   Neurological: alert and oriented, UE and LE strength equal b/l, facial symmetry present  Cardiovascular: RRR, Clear S1 and S2, no murmurs appreciated  Respiratory: chest expansion symmetrical, CTA b/l, no wheezing noted  Gastrointestinal: +BS, soft, NT, ND  Extremities: moving spontaneously, no calf tenderness or edema.  Vascular: warm, well perfused. DP/PT pulses palpable b/l.    LABS:                        8.4    5.11  )-----------( 141      ( 08 Aug 2023 10:48 )             26.1     08-08    143  |  108  |  12  ----------------------------<  109<H>  3.8   |  27  |  1.09    Ca    8.7      08 Aug 2023 10:48    Urinalysis Basic - ( 08 Aug 2023 10:48 )    Color: x / Appearance: x / SG: x / pH: x  Gluc: 109 mg/dL / Ketone: x  / Bili: x / Urobili: x   Blood: x / Protein: x / Nitrite: x   Leuk Esterase: x / RBC: x / WBC x   Sq Epi: x / Non Sq Epi: x / Bacteria: x    RADIOLOGY & ADDITIONAL STUDIES:  CT Chest:  IMPRESSION:  Mass in the upper thoracic esophagus suspicious for neoplasm.  Thoracic lymphadenopathy as above, likely metastatic.  Nonspecific right adrenal nodule. Questionable lesion in the upper pole of the right kidney. Consider follow-up MRI.    CT Neck:   A 3.9 cm proximal esophagus mass is most concerning for malignancy.  There is a right paratracheal node adjacent to it with gross extranodal spread invading the thyroid gland and possibly the recurrent laryngeal nerve. Correlate for a right vocal palsy.  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 Please refer to chest CT for complete detail thereof.

## 2023-08-15 PROBLEM — R59.1 LYMPHADENOPATHY: Status: ACTIVE | Noted: 2023-01-01

## 2023-08-15 PROBLEM — J38.01 VOCAL CORD PARALYSIS, UNILATERAL COMPLETE: Status: ACTIVE | Noted: 2023-01-01

## 2023-08-15 PROBLEM — D49.0: Status: ACTIVE | Noted: 2023-01-01

## 2023-08-15 PROBLEM — D64.9 ANEMIA OF UNKNOWN ETIOLOGY: Status: ACTIVE | Noted: 2023-01-01

## 2023-08-15 NOTE — REVIEW OF SYSTEMS
[Fatigue] : fatigue [Recent Change In Weight] : ~T recent weight change [Dysphagia] : dysphagia [Loss of Hearing] : loss of hearing [Hoarseness] : hoarseness [Cough] : cough [SOB on Exertion] : shortness of breath during exertion [Diarrhea: Grade 0] : Diarrhea: Grade 0 [Joint Pain] : joint pain [Swollen Glands] : swollen glands [Negative] : Allergic/Immunologic [Fever] : no fever [Chills] : no chills [Night Sweats] : no night sweats [Nosebleeds] : no nosebleeds [Odynophagia] : no odynophagia [Mucosal Pain] : no mucosal pain [Shortness Of Breath] : no shortness of breath [Wheezing] : no wheezing [Joint Stiffness] : no joint stiffness [Muscle Pain] : no muscle pain [Muscle Weakness] : no muscle weakness [Easy Bleeding] : no tendency for easy bleeding

## 2023-08-15 NOTE — PHYSICAL EXAM
[Ambulatory and capable of all self care but unable to carry out any work activities] : Status 2- Ambulatory and capable of all self care but unable to carry out any work activities. Up and about more than 50% of waking hours [Thin] : thin [Normal] : normal appearance, no rash, nodules, vesicles, ulcers, erythema [de-identified] : no acute distress  [de-identified] : supple.  + LAD.  R side thyroid enlargement [de-identified] : + lymphadenopathy - large 3-4 cm node l lower cervical/supraclavicular region

## 2023-08-15 NOTE — HISTORY OF PRESENT ILLNESS
[de-identified] : RAMANDEEP CALDERA is a 89 year old male here for hospital follow up of esophagus mass with lymphadenopathy, r/o malignancy.  Oncology history: -he was initially seen by Dr. Mcguire on 7/7/23 with 3-month history of hoarseness and dysphagia and plan was to have MRI brain, neck, and barium swallow. -presented to [facility] ED on 8/8/23 with hoarseness and worsening dysphagia x 5 days, he was unable to keep anything po down at that time.  -CT chest on 8/8/23 showed mass in the upper thoracic esophagus suspicious for neoplasm, 3 x 2.5 cm left supraclavicular lymphadenopathy. Left paratracheal lymph node measuring 1 cm in short axis. 2.5 x 2.2 cm retrocrural lymphadenopathy. Mildly enlarged bilateral hilar lymph nodes, nonspecific right adrenal nodule. Questionable 1.4 cm hypodense lesion in the upper pole of the right kidney. Consider follow-up MRI. 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   PMH: HTN, intracranial aneurysm(2016), mixed hearing loss, arthritis PSH: cataract surgery, thyroid bx in 9/2022, benign as per pt FM: no family history of cancer SH : former heavy smoker(stopped 40 years ago), former alcohol use (stopped 15 years ago), lives with his wife in [place]  [de-identified] : He presents to clinic with his granddaughter. He has lost over 20 lbs in the past 2 years. He is able to eat soft foods and continues to have hoarseness, has labored breathing. Denies chest pain, nausea, vomiting, diarrhea, constipation, dizziness, headaches, or abdominal pain.  He is able to perform ADLs with some assistance.

## 2023-08-15 NOTE — ASSESSMENT
[FreeTextEntry1] : Carlos Frances is a 89 year old man who presents w/ progressive hoarseness, dysphagia to solid foods, and weight loss.  CT neck/chest in the ER 8/8/23 showed a mass in the upper esophagus at the thoracic inlet extending into the surrounding fatty tissue.  Along with adenopathy in the L supraclavicular, paratracheal and retrocrural regions.  Scoped by ENT in the ER and found to have R vocal cord paralysis.  Labs in the ER also show severe normocytic anemia with a hgb of 8.4.  Plan: 1.  proximal esophageal mass w/ lymphadenopathy --reviewed scan findings w/ pt and his granddaughter.  explained concerns for malignancy --refer to IR for biopsy ASAP - consider targeting the palpable L supraclavicular LN --CT a/p to complete staging --differential includes primary esophageal neoplasm, lymphoma, thyroid malignancy, etc.  await pathology before discussing prognosis, treatment plan.  Pt and his granddaughter Myriam understand that this is extremely serious and the likelihood of malignancy --discussed that if symptoms of dysphagia, hoarseness worsen while awaiting test results, or if he develops SOB, he should return to ER for admission for expedited workup  2.  Anemia  --complete eval w/ iron studies, B12/folate, TSH, LFTs --Pt, PTT pre-procedure for biopsy  3.  vocal cord paralysis --has appt w/ Dr Delcid 8/31/23 for management of this  4.  weight loss --managing with a soft diet and thickened liquids --consult oncology dietician.  RTC 1-2 weeks after biopsy for treatment planning.

## 2023-08-17 NOTE — CONSULT NOTE ADULT - PROBLEM SELECTOR RECOMMENDATION 5
Patient was made a DNR/ and do not reintubate once extubated. Introduced role of palliative medicine to the granddaughters and support was provided. Will continue to follow for GOC and symptomatic management. Patients goddaughter's stated that he was independent. Emotional support was provided.  As discussed during the palliative IDT meeting, the patients PSSA screening did not identify any current psychosocial need or spiritual support deficits.  - Uatsdin/Spiritual practice: Unknown   - Coping: [ ] well [ ] with difficulty [ ] poor coping  [x] unknown   - Support system: [ x] strong [ ] adequate [ ] inadequate  - All questions answered, emotional support provided  -  primary team   - Please contact Palliative Medicine 24/7 at [phone] for any acute symptoms or further questions  - Will continue to follow with you

## 2023-08-17 NOTE — CONSULT NOTE ADULT - ASSESSMENT
89 year male with PMH of HTN, intracranial aneurysm, esophageal mass with lymphadenopathy who presents with shortness of breath. Hematology/Oncology consulted for Esophageal Mass    #Esophageal mass  - noted on CT scan 8/8/23 with supraclavicular, left paratracheal (with invasion into thyroid gland), retrocrural, and hilar lymphadenopathy. Nonspecific right adrenal nodule and 1.4 cm hypodense lesion in right kidney also noted  - seen by Dr. Priest 8/15/23 and had planned for IR biopsy 8/18/23   - concerning for malignancy, differential includes esophageal neoplasm vs lymphoma vs thyroid malignancy, though ultimately would need biopsy to determine treatment pathway.   - if/when hemodynamically stable, would obtain core needle biopsy as treatment options would differ depending on diagnosis  - would also obtain CT A/P to complete staging as clinical status improves   - given ongoing aspiration events, may also need to consider alternative means of nutrition, and so agree with palliative care consultation to assist with goals of care needs  - of note, per discussion with granddaughter, patient does want full treatment at this time and would like to know treatment options for her malignancy when they are available  - appreciate excellent care of primary team     Discussed with Dr. Jacob. Recommendations are considered final after attending attestation

## 2023-08-17 NOTE — ED ADULT TRIAGE NOTE - CHIEF COMPLAINT QUOTE
Pt reports SOB and wheezing x1 hour. Pt reports he was seen on Tuesday for same and dx with lung CA.

## 2023-08-17 NOTE — PROCEDURE NOTE - NSUS ED ADDITIONAL DETAIL1 FT
Bradycardic, normal-mildly reduced systolic function although likely in setting of bradycardia. VTI normal. No RWMA, VTI 20 cm.
A-line pattern present bilaterally anteriorly. Bilateral lower lobe consolidations.

## 2023-08-17 NOTE — H&P ADULT - ASSESSMENT
89 yo M PMH recently diagnosed esophageal cancer, aspirations and vocal cord paralysis presents to the ED for 1 day of worsening dyspnea found to be hypoxic to 90% w/ AMS and worsening CO2 retention, subsequently intubated and admitted to the MICU for further management.     NEURO  #Intubated 2/2 AHRF w/ hypercapnia  Intubated, sedated after inability to maintain airway. Currently in NAD.  Pt intubated after worsening of mental status and questionable ability to maintain airway.   - ABG  - c propofol for now , wean as tolerated, and evaluate potential for extubation       PULMONARY   #Acute hypoxic and hypercapnic respiratory failure  Possibly 2/2 aspiration PNA vs esophogeal mass effect on trachea.  CT pointing towards most likely bilateral aspiration PNA, but cannot r/o increased mass effect compromising airway.  - will hold off on ENT consult at this time while we continue to workup underlying cause   - c intubation and propofol for now, will evaluate potential for extubation with ABGs  - vanc and zosyn for potential underlying b/l aspiration PNA contribution   - wean O2 as tolerated  - repeat blood gas      #Potential History of COPD/Asthma  Called pharmacy for med rec, on albuterol and symbicort at home.  - can start q6 duonebs   - CTM respiratory status      CARDIOVASCULAR  #HTN  On atenolol at home. BPs stable.  - hold home atenolol for now       GI  #Esophogeal Obstruction  Mass effect contributing to aspiration risk.   - Can consider PEG given multiple aspirations, will hold off for now until goals of care are determined and oncology input received      ENDOCRINE  - GITA      RENAL  #BRETT  BUN stable, creatinine slightly elevated at 1.31. Likely 2/2 poor PO intake given dysphagia and hypoxemia. Can't rule out transient intrinsic insult now s/p CTA with contrast.   - maintenance IVF completed, will hold off on restarting pending updated labs   - avoid nephrotoxic meds if able      INFECTIOUS DISEASE  #Bilat Pneumonia  Likely aspiration given history of esophogeal mass  - broaden to vanc and zosyn   - Bcx x2 and sputum cx, f/u UA and Ucx      HEMATOLOGY/ONCOLOGY  # Esophageal cancer  New diagnosis, oncology f/u outpt and was scheduled for a L supraclavicular node biopsy on 8/18. Oncology consulted, verbally let us know that he has never had the mass biopsied. A biopsy  would be needed to determine route of care (if solic cancer would lean radiation, if lymphoma would lean medical/chemo management)                                                - palliative consult   - will consider GOC and have that discussion with family to determine plan regarding whether they would like to do a biopsy vs a different GOC approach      PPX  F: none at this time  E: replete  N: NPO  Dispo: Full  code   89 yo M PMH recently diagnosed esophageal cancer, aspirations and vocal cord paralysis presents to the ED for 1 day of worsening dyspnea found to be hypoxic to 90% w/ AMS and worsening CO2 retention, subsequently intubated and admitted to the MICU for further management.     NEURO  #Intubated 2/2 AHRF w/ hypercapnia  Intubated, sedated after inability to maintain airway. Currently in NAD.  Pt intubated after worsening of mental status and questionable ability to maintain airway.   - ABG  - c propofol for now , wean as tolerated, and evaluate potential for extubation       PULMONARY   #Acute hypoxic and hypercapnic respiratory failure  Possibly 2/2 aspiration PNA vs esophogeal mass effect on trachea.  CT pointing towards most likely bilateral aspiration PNA, but cannot r/o increased mass effect compromising airway.  - will hold off on ENT consult at this time while we continue to workup underlying cause   - c intubation and propofol for now, will evaluate potential for extubation with ABGs  - vanc and zosyn for potential underlying b/l aspiration PNA contribution   - wean O2 as tolerated  - repeat blood gas      #Potential History of COPD/Asthma  Called pharmacy for med rec, on albuterol and symbicort at home.  - can start q6 duonebs   - CTM respiratory status      CARDIOVASCULAR  #HTN  On atenolol at home. BPs stable.  - hold home atenolol for now       GI  #Esophogeal Obstruction  Mass effect contributing to aspiration risk.   - Can consider PEG given multiple aspirations, will hold off for now until goals of care are determined and oncology input received      ENDOCRINE  - GITA      RENAL  #BRETT  BUN stable, creatinine slightly elevated at 1.31. Likely 2/2 poor PO intake given dysphagia and hypoxemia. Can't rule out transient intrinsic insult now s/p CTA with contrast.   - maintenance IVF completed, will hold off on restarting pending updated labs   - avoid nephrotoxic meds if able      INFECTIOUS DISEASE  #Bilat Pneumonia  Likely aspiration given history of esophogeal mass  - broaden to vanc and zosyn   - Bcx x2 and sputum cx, f/u UA and Ucx      HEMATOLOGY/ONCOLOGY  # Esophageal cancer   New diagnosis, oncology f/u outpt had described a differential of (primary esophogeal, thyroid, lymphoma) and was scheduled for a L supraclavicular node biopsy on 8/18. Oncology consulted, verbally let us know that he has never had the mass or enlarged lymph nodes biopsied. A biopsy would be needed to determine route of care (if solid cancer would lean radiation, if lymphoma would lean medical/chemo management). Cannot r/o other primary cancer etiology and subsequent mets.                                               - palliative consult   - will consider GOC and have that discussion with family to determine plan regarding whether they would like to do a biopsy vs a different GOC approach      PPX  F: none at this time  E: replete  N: NPO  Dispo: Full  code   89 yo M PMH recently diagnosed esophageal cancer, aspirations and vocal cord paralysis presents to the ED for 1 day of worsening dyspnea found to be hypoxic to 90% w/ AMS and worsening CO2 retention, subsequently intubated and admitted to the MICU for further management.     NEURO  #Intubated 2/2 AHRF w/ hypercapnia  Intubated, sedated after inability to maintain airway. Currently in NAD.  Pt intubated after worsening of mental status and questionable ability to maintain airway.   - ABG  - c propofol for now , wean as tolerated, and evaluate potential for extubation       PULMONARY   #Acute hypoxic and hypercapnic respiratory failure  Possibly 2/2 aspiration PNA vs esophogeal mass effect on trachea.  CT pointing towards most likely bilateral aspiration PNA, but cannot r/o increased mass effect compromising airway.  - will hold off on ENT consult at this time while we continue to workup underlying cause   - c intubation and propofol for now, will evaluate potential for extubation with ABGs  - vanc and zosyn for potential underlying b/l aspiration PNA contribution   - wean O2 as tolerated  - repeat blood gas      #Potential History of COPD/Asthma  Called pharmacy for med rec, on albuterol and symbicort at home.  - can start q6 duonebs   - CTM respiratory status      CARDIOVASCULAR  #HTN  On atenolol at home. BPs stable.  - hold home atenolol for now       GI  #Esophogeal Obstruction  Mass effect contributing to aspiration risk.   - Can consider PEG given multiple aspirations, will hold off for now until goals of care are determined and oncology input received      ENDOCRINE  - GITA      RENAL  #BRETT  BUN stable, creatinine slightly elevated at 1.31. Likely 2/2 poor PO intake given dysphagia and hypoxemia. Can't rule out transient intrinsic insult now s/p CTA with contrast.   - maintenance IVF completed, will hold off on restarting pending updated labs   - avoid nephrotoxic meds if able      INFECTIOUS DISEASE  #Bilat Pneumonia  Likely aspiration given history of esophogeal mass  - broaden to vanc and zosyn. Vanc at 1250 mg once and vanc trough tomorrow 11AM, will adjust appropriately   - Bcx x2 and sputum cx, f/u UA and Ucx      HEMATOLOGY/ONCOLOGY  # Esophageal cancer   New diagnosis, oncology f/u outpt had described a differential of (primary esophogeal, thyroid, lymphoma) and was scheduled for a L supraclavicular node biopsy on 8/18. Oncology consulted, verbally let us know that he has never had the mass or enlarged lymph nodes biopsied. A biopsy would be needed to determine route of care (if solid cancer would lean radiation, if lymphoma would lean medical/chemo management). Cannot r/o other primary cancer etiology and subsequent mets.                                               - palliative consult   - will consider GOC and have that discussion with family to determine plan regarding whether they would like to do a biopsy vs a different GOC approach      PPX  F: none at this time  E: replete  N: NPO  Dispo: Full  code   89 yo M PMH recently diagnosed esophageal cancer, aspirations and vocal cord paralysis presents to the ED for 1 day of worsening dyspnea found to be hypoxic to 90% w/ AMS and worsening CO2 retention, subsequently intubated and admitted to the MICU for further management.     NEURO  #Intubated 2/2 AHRF w/ hypercapnia  Intubated, sedated after inability to maintain airway. Currently in NAD.  Pt intubated after worsening of mental status and questionable ability to maintain airway.   - ABG  - c propofol for now , wean as tolerated, and evaluate potential for extubation       PULMONARY   #Acute hypoxic and hypercapnic respiratory failure  Possibly 2/2 aspiration PNA vs esophogeal mass effect on trachea.  CT pointing towards most likely bilateral aspiration PNA, but cannot r/o increased mass effect compromising airway.  - will hold off on ENT consult at this time while we continue to workup underlying cause   - c intubation and propofol for now, will evaluate potential for extubation with ABGs  - vanc and zosyn for potential underlying b/l aspiration PNA contribution   - wean O2 as tolerated  - repeat blood gas      #Potential History of COPD/Asthma  Called pharmacy for med rec, on albuterol and symbicort at home.  - can start q6 duonebs   - CTM respiratory status      CARDIOVASCULAR  #HTN  On atenolol at home. BPs stable.  - hold home atenolol for now       GI  #Esophogeal Obstruction  Mass effect contributing to aspiration risk.   - Can consider PEG given multiple aspirations, will hold off for now until goals of care are determined and oncology input received      ENDOCRINE  - GITA      RENAL  #BRETT  BUN stable, creatinine slightly elevated at 1.31. Likely 2/2 poor PO intake given dysphagia and hypoxemia. Can't rule out transient intrinsic insult now s/p CTA with contrast.   - maintenance IVF completed, will hold off on restarting pending updated labs   - avoid nephrotoxic meds if able      INFECTIOUS DISEASE  #Bilat Pneumonia  Likely aspiration given history of esophogeal mass  - broaden to vanc and zosyn. Vanc at 1250 mg once and vanc trough tomorrow 11AM, will adjust appropriately   - Bcx x2 and sputum cx, f/u UA and Ucx      HEMATOLOGY/ONCOLOGY  # Esophageal cancer   New diagnosis, oncology f/u outpt had described a differential of (primary esophogeal, thyroid, lymphoma) and was scheduled for a L supraclavicular node biopsy on 8/18. Oncology consulted, verbally let us know that he has never had the mass or enlarged lymph nodes biopsied. A biopsy would be needed to determine route of care (if solid cancer would lean radiation, if lymphoma would lean medical/chemo management). Cannot r/o other primary cancer etiology and subsequent mets.                                               - palliative consult   - will consider GOC and have that discussion with family to determine plan regarding whether they would like to do a biopsy vs a different GOC approach      PPX  F: none at this time  E: replete  N: NPO  DVT PPx: heparin subq  Dispo: Full  code   89 yo M PMH recently diagnosed esophageal cancer, aspirations and vocal cord paralysis presents to the ED for 1 day of worsening dyspnea found to be hypoxic to 90% w/ AMS and worsening CO2 retention, subsequently intubated and admitted to the MICU for further management.     NEURO  #Intubated 2/2 AHRF w/ hypercapnia  Intubated, sedated after inability to maintain airway. Currently in NAD.  Pt intubated after worsening of mental status and questionable ability to maintain airway.   - ABG  - c propofol for now , wean as tolerated, and evaluate potential for extubation       PULMONARY   #Acute hypoxic and hypercapnic respiratory failure  Possibly 2/2 aspiration PNA vs esophogeal mass effect on trachea.  CT pointing towards most likely bilateral aspiration PNA, but cannot r/o increased mass effect compromising airway.  - will hold off on ENT consult at this time while we continue to workup underlying cause   - c intubation and propofol for now, will evaluate potential for extubation with ABGs  - vanc and zosyn for potential underlying b/l aspiration PNA contribution   - wean O2 as tolerated  - repeat blood gas      #Potential History of COPD/Asthma  Called pharmacy for med rec, on albuterol and symbicort at home.  - can start q6 duonebs   - CTM respiratory status      CARDIOVASCULAR  #HTN  On atenolol at home. BPs stable.  - hold home atenolol for now       GI  #Esophogeal Obstruction  Mass effect contributing to aspiration risk.   - Can consider PEG given multiple aspirations, will hold off for now until goals of care are determined and oncology input received      ENDOCRINE  - GITA      RENAL  #BRETT  BUN stable, creatinine slightly elevated at 1.31. Likely 2/2 poor PO intake given dysphagia and hypoxemia. Can't rule out transient intrinsic insult now s/p CTA with contrast.   - maintenance IVF completed, will hold off on restarting pending updated labs   - avoid nephrotoxic meds if able      INFECTIOUS DISEASE  #Bilat Pneumonia  Likely aspiration given history of esophogeal mass  - broaden to vanc and zosyn. Vanc at 1250 mg once and vanc trough tomorrow 11AM, will adjust appropriately   - Bcx x2 and sputum cx, f/u UA and Ucx      HEMATOLOGY/ONCOLOGY  # Esophageal cancer   New diagnosis, oncology f/u outpt had described a differential of (primary esophogeal, thyroid, lymphoma) and was scheduled for a L supraclavicular node biopsy on 8/18. Oncology consulted, verbally let us know that he has never had the mass or enlarged lymph nodes biopsied. A biopsy would be needed to determine route of care (if solid cancer would lean radiation, if lymphoma would lean medical/chemo management). Cannot r/o other primary cancer etiology and subsequent mets.                                               - palliative consult   - will consider GOC and have that discussion with family to determine plan regarding whether they would like to do a biopsy vs a different GOC approach  - can consider TSH T4 on labs, will modify pending onc recs       PPX  F: none at this time  E: replete  N: NPO  DVT PPx: heparin subq  Dispo: Full  code   89 yo M PMH recently diagnosed esophageal cancer, aspirations and vocal cord paralysis presents to the ED for 1 day of worsening dyspnea found to be hypoxic to 90% w/ AMS and worsening CO2 retention, subsequently intubated and admitted to the MICU for further management.     NEURO  #Intubated 2/2 AHRF w/ hypercapnia  Intubated, sedated after inability to maintain airway. Currently in NAD.  Pt intubated after worsening of mental status and questionable ability to maintain airway.   - ABG  - c propofol for now , wean as tolerated, and evaluate potential for extubation       PULMONARY   #Acute hypoxic and hypercapnic respiratory failure  Possibly 2/2 aspiration PNA vs esophogeal mass effect on trachea.  CT pointing towards most likely bilateral aspiration PNA, but cannot r/o increased mass effect compromising airway.  - will hold off on ENT consult at this time while we continue to workup underlying cause   - c intubation and propofol for now, will evaluate potential for extubation with ABGs  - vanc and zosyn for potential underlying b/l aspiration PNA contribution   - wean O2 as tolerated  - repeat blood gas      #Potential History of COPD/Asthma  Called pharmacy for med rec, on albuterol and symbicort at home.  - can start q6 duonebs   - CTM respiratory status      CARDIOVASCULAR  #HTN  On atenolol at home. BPs stable.  - hold home atenolol for now       GI  #Esophogeal Obstruction  Mass effect contributing to aspiration risk.   - Can consider PEG given multiple aspirations, will hold off for now until goals of care are determined and oncology input received      ENDOCRINE  - GITA      RENAL  #BRETT  BUN stable, creatinine slightly elevated at 1.31. Likely 2/2 poor PO intake given dysphagia and hypoxemia. Can't rule out transient intrinsic insult now s/p CTA with contrast.   - maintenance IVF completed, will hold off on restarting pending updated labs   - avoid nephrotoxic meds if able      INFECTIOUS DISEASE  #Bilat Pneumonia  Likely aspiration given history of esophogeal mass  - broaden to vanc and zosyn. Vanc at 1250 mg once and vanc trough tomorrow 11AM, will adjust appropriately   - Bcx x2 and sputum cx, f/u UA and Ucx      HEMATOLOGY/ONCOLOGY  # Esophageal cancer   New diagnosis, oncology f/u outpt had described a differential of (primary esophogeal, thyroid, lymphoma) and was scheduled for a L supraclavicular node biopsy on 8/18. Oncology consulted, verbally let us know that he has never had the mass or enlarged lymph nodes biopsied. A biopsy would be needed to determine route of care (if solid cancer would lean radiation, if lymphoma would lean medical/chemo management). Cannot r/o other primary cancer etiology and subsequent mets.                                               - palliative consult   - will consider GOC and have that discussion with family to determine plan regarding whether they would like to do a biopsy vs a different GOC approach  - can consider TSH T4 on labs, will modify pending onc recs   - can consider GI basis for malignancy as well given L supraclavicular node enlargement       PPX  F: none at this time  E: replete  N: NPO  DVT PPx: heparin subq  Dispo: Full  code

## 2023-08-17 NOTE — ED PROVIDER NOTE - PROGRESS NOTE DETAILS
pt with stridor, hx of vocal cord paralysis, feeling anxious on bipap, will try small dose of ativan PNA on CT - will give ABX. will consult ICU pt with increasing somnolence. repeat VBG with worsening hypercarbia and acidosis. pt intubated for resp failure and airway protection. [phone] Nanette (granddaughter) - alerted to pt status yary: pt received at sign out as pending micu admission - pt seen and to be admitted

## 2023-08-17 NOTE — H&P ADULT - NSHPPHYSICALEXAM_GEN_ALL_CORE
General: intubated, sedated, NAD   Head: MMM; PERRL  Neck: Supple; no JVP elevation   Respiratory: CTAB; no wheezes/rales/rhonchi, mechanical vent sounds  Cardiovascular: RRR; S1/S2+, no murmurs, rubs gallops   Gastrointestinal: Soft; NTND; bowel sounds normal and present  Extremities: cool, well perfused, no edema/cyanosis  Neurological: A&Ox0  Skin: Clean and intact. Good skin turgor. Without open wounds and sores

## 2023-08-17 NOTE — CONSULT NOTE ADULT - ASSESSMENT
Assessment & Plan     89 y.o M w/ PMHx of recently diagnosed esophageal cancer, aspirations and vocal cord paralysis presents to the ED for 1 day of worsening dyspnea.    NEURO  #intubated and sedated  Pt intubated after worsening of mental status and questionable ability to maintain airway. On propofol  - wean as tolerated      PULMONARY   # Acute hypoxic and hypercapnic resp failure  Pt presenting with 1 day worsening dyspnea. Hx of esophageal cancer ?mass effect on trachea w/ airway compromise. However no stridor on exam. Likely 2/2 bilat aspiration PNA given CT finding and previous risk and hx. Per documentation no hx of smoking. ENT were following pt on last visit on Aug 8th. Did not adequately respond to BiPAP with worsening hypercapnic and mental status and worsening blood gas and Co2 retention. Intubated on 450/12 -> 20/5/50%  - ENT consult for eval of mass and potential mass effect  - Treat underlying infection  - wean O2 as tolerated  - repeat blood gas  - NPO for now until further eval    GI  - PPI if >48 hr intubation    ENDOCRINE  GITA    RENAL  #BRETT  likely 2/2 poor PO intake given dysphagia and hypoxemia and now s/p CTA with contrast  - maintenance IVF  - avoid nephrotoxic meds if appropriate     INFECTIOUS DISEASE  # Bilat PNA  Likely aspiration given Hx.   - broaden to vanc and zosyn   - Bcx x2 and sputum cx, f/u UA and Ucx  - IVF     HEMATOLOGY  # Esophageal cancer  New diagnosis, onocology f/u outpt  - palliative consult and GOC    CODE STATUS: FULL code    CASE DISCUSSED WITH CRITICAL CARE FELLOW         Assessment & Plan     89 y.o M w/ PMHx of recently diagnosed esophageal cancer, aspirations and vocal cord paralysis presents to the ED for 1 day of worsening dyspnea.    NEURO  #intubated and sedated  Pt intubated after worsening of mental status and questionable ability to maintain airway. On propofol  - wean as tolerated      PULMONARY   # Acute hypoxic and hypercapnic resp failure  Pt presenting with 1 day worsening dyspnea. Hx of esophageal cancer ?mass effect on trachea w/ airway compromise. However no stridor on exam. Likely 2/2 bilat aspiration PNA given CT finding and previous risk and hx. Per documentation no hx of smoking. ENT were following pt on last visit on Aug 8th. Did not adequately respond to BiPAP with worsening hypercapnic and mental status and worsening blood gas and Co2 retention. Intubated on 450/12 -> 20/5/50%  - ENT consult for eval of mass and potential mass effect  - Treat underlying infection  - wean O2 as tolerated  - repeat blood gas  - NPO for now until further eval    GI  - PPI if >48 hr intubation    ENDOCRINE  GITA    RENAL  #BRETT  likely 2/2 poor PO intake given dysphagia and hypoxemia and now s/p CTA with contrast  - maintenance IVF  - avoid nephrotoxic meds if appropriate     INFECTIOUS DISEASE  # Bilat PNA  Likely aspiration given Hx.   - broaden to vanc and zosyn   - Bcx x2 and sputum cx, f/u UA and Ucx  - IVF     HEMATOLOGY  # Esophageal cancer  New diagnosis, onocology f/u outpt  - palliative consult and GOC    CODE STATUS: FULL code  DISPO: MICU    CASE DISCUSSED WITH CRITICAL CARE FELLOW

## 2023-08-17 NOTE — GOALS OF CARE CONVERSATION - ADVANCED CARE PLANNING - CONVERSATION DETAILS
Spoke with grand-daughter Nanette at bedside upon arrival and explained patient currently presented to hospital with hypercapnic respiratory failure now intubated and being treated for pneumonia. Discussed our hope that he will improve with antibiotics and time but will respect both the patient's wish and family's wish that we not prolong any unnecessary suffering and should his heart stop we will not perform CPR. If upon extubation patient fails extubation-would not re-intubate.  Additional discussion had regarding concern for esophageal cancer-and desire for possible diagnosis-family is amenable to LN FNA to obtain diagnosis. Emotional support provided.

## 2023-08-17 NOTE — ED PROVIDER NOTE - CARE PLAN
1 Principal Discharge DX:	PNA (pneumonia)   Principal Discharge DX:	PNA (pneumonia)  Secondary Diagnosis:	Acute respiratory failure with hypercapnia

## 2023-08-17 NOTE — ED ADULT NURSE NOTE - NSSEPSISSUSPECTED_ED_A_ED
Patient Instructions by Ella Shabazz APN at 07/19/17 09:17 AM     Author:  Ella Shabazz APN Service:  (none) Author Type:  Nurse Practitioner     Filed:  07/19/17 09:17 AM Encounter Date:  7/19/2017 Status:  Addendum     :  Ella Shabazz APN (Nurse Practitioner)            PATIENT INFORMATION   Use cool compresses several times per day for comfort and swelling.   If itching, may use benadryl or zyrtec.  Monitor for signs of infection (increased redness, swelling, pain)  Ibuprofen as needed for pain.     Follow-Up  -- New, worse or persistent symptoms      Additional Educational Resources:  For additional resources regarding your symptoms, diagnosis, or further health information, please visit the Health Resources section on [email or web address] or the Online Health Resources section in [employer].          Wasp sting  Definition   This article describes the effects of a wasp sting.   This article is for information only. DO NOT use it to treat or manage a sting. If you or someone you are with is stung, call your local emergency number (such as 911) or the National Poison Control Center at 1-785.306.5752.   Poisonous Ingredient   Wasp venom is poisonous. It is injected into you when you are stung.   Where Found   Wasps carry this venom. Some people are allergic to the venom and have a serious reaction if they are stung. Most people do not need emergency medical treatment if they are stung.   Symptoms   Below are symptoms of a wasp sting in different parts of the body.   Note: The symptoms marked with an asterisk (*) are from an allergic reaction to the venom, not from the venom itself.   Eyes, ears, nose, and throat   · Swelling of throat, lips, and mouth *  Stomach and intestines   · Abdominal cramping  · Diarrhea  · Nausea  · Vomiting  Heart and blood vessels   · Severe decrease in blood pressure  · Collapse*  Lungs   · Difficulty breathing *  Skin   · Hives *  · Itching  · Swelling and pain at  site of sting  Home Care   For severe reactions:   Call 911 if the person has an allergic reaction (severe swelling or difficulty breathing). You may need to go to the hospital if the reaction is severe.   If you know you are allergic to bee, wasp, or yellow jacket stings, carry a bee sting kit with you and know how to use it. The kit requires a prescription.   To remove the bee stinger:   Carefully scrape a blunt edge (such as a butter knife) over the area to remove the stinger. Do this only if the person can stay still and it is safe to do so. You can also pull out the stinger with tweezers or your fingers, but avoid pinching the venom sac at the end of the stinger. If this sac is broken, more venom will be released.   Clean the area thoroughly with soap and water.   For mild symptoms:   Place ice (wrapped in a clean cloth or other covering) on the site of the sting for 10 minutes and then off for 10 minutes. Repeat this process. If the person has blood flow problems, reduce the time the ice is used to prevent possible skin damage.   If the person has only mild symptoms, give them the antihistamine medicine diphenhydramine (Benadryl) by mouth if they can swallow. This may be all that's needed to treat a mild reaction.   Before Calling Emergency   Have this information ready:   · Person's age, weight, and condition  · Type of insect  · Time the sting occurred  · Location of the sting  Poison Control   The National Poison Control Center (1-683.609.1858) can be called from anywhere in the [place]. This national hotline number will let you talk to experts in poisoning. They will give you further instructions.   This is a free and confidential service. All local poison control centers in the [place] use this national number. You should call if you have any questions about poisoning or poison prevention. It does NOT need to be an emergency. You can call for any reason, 24 hours a day, 7 days a week.   What  to Expect at the Emergency Room   If an emergency room visit is necessary, the health care provider will measure and monitor the person's vital signs, including temperature, pulse, breathing rate, and blood pressure. Symptoms will be treated. The person may also receive:   · Blood and urine tests  · Breathing support, including oxygen, tube through the mouth into the throat, and breathing machine  · Chest x-ray  · EKG (electrocardiogram, or heart tracing)  · Intravenous fluids (through a vein)  · Medicines to treat symptoms  [place] (Prognosis)   If an allergic reaction occurs, death may occur within 1 hour. The faster a person gets medical help, the better the chance for recovery.   Symptoms in people who do not have a sting allergy are likely to go away completely within a week.   References   Hector TB, Jamshid MANZANARES Jr. Arthropod envenomation and parasitism. In: Myron PS, ed. [facility] Medicine. 6th ed. [facility] [place]: Elsedarlyn Huitron; 2011:chap 50.   Theresa EJ. Venomous animal injuries. In: Rahul LYONS, adam. [facility] Emergency Medicine: Concepts and Clinical Practice. 8th ed. [place], [place]: Fabian Lucas; 2014:chap 62.      Review Date: 7/14/2015  Reviewed By: Ranulfo Ely MD, MHA, Emergency Medicine, [facility], [place], [place]. Also reviewed by David Zieve, MD, MHA, Kennedi Perdomo, PhD, and the A.D.A.M. Editorial team.           Revision History        User Key Date/Time User Provider Type Action    > [N/A] 07/19/17 09:17 AM Ella Shabazz APN Nurse Practitioner Addend     [N/A] 07/19/17 09:17 AM Ella Shabazz APN Nurse Practitioner Addend     [N/A] 07/19/17 09:17 AM Ella Shabazz APN Nurse Practitioner Sign             No

## 2023-08-17 NOTE — ED PROCEDURE NOTE - CPROC ED TIME OUT STATEMENT1
“Patient's name, , procedure and correct site were confirmed during the [place] Timeout.”
“Patient's name, , procedure and correct site were confirmed during the [place] Timeout.”

## 2023-08-17 NOTE — CONSULT NOTE ADULT - ASSESSMENT
89 year old man with respiratory failure, Functional quadriplegia, Pneumonia, Esophageal cancer and encounter for palliative care.

## 2023-08-17 NOTE — CONSULT NOTE ADULT - SUBJECTIVE AND OBJECTIVE BOX
==========ICU Consult ==========    Consult reason: AHRF    HPI:  89 y.o M w/ PMHx of recently diagnosed esophageal cancer, aspirations and vocal cord paralysis presents to the ED for 1 day of worsening dyspnea. Pt recently presented to [facility] on 08/08 for 3 months of hoarseness and difficulty with swallowing was evaluated by ENT, found to have esophageal mass/cancer with R vocal cord paralysis. Pt was evaluated by S&S that required MBSS for further recommendation.    89M hx htn, intracranial aneurysm, c/o SOB. pt states worsening SOB tonight. no fevers. no vomiting. states has vocal cord paralysis. pt was recently diagnosed with esophageal mass and is being worked up by oncologist.      PAST MEDICAL & SURGICAL HISTORY:  HTN (hypertension)      History of intracranial aneurysm      Esophageal mass          PAST MEDICAL & SURGICAL HISTORY:  HTN (hypertension)    History of intracranial aneurysm    Esophageal mass          Allergies    No Known Allergies    Intolerances      Home Medications:        PAST MEDICAL & SURGICAL HISTORY:  HTN (hypertension)      History of intracranial aneurysm      Esophageal mass          FAMILY HISTORY:  :      ROS:  See HPI     ICU Vital Signs Last 24 Hrs  T(C): 36.8 (17 Aug 2023 02:45), Max: 36.8 (17 Aug 2023 02:45)  T(F): 98.3 (17 Aug 2023 02:45), Max: 98.3 (17 Aug 2023 02:45)  HR: 79 (17 Aug 2023 08:02) (71 - 101)  BP: 113/58 (17 Aug 2023 08:02) (103/60 - 191/98)  BP(mean): --  ABP: --  ABP(mean): --  RR: 18 (17 Aug 2023 08:02) (17 - 27)  SpO2: 97% (17 Aug 2023 08:02) (90% - 100%)    O2 Parameters below as of 17 Aug 2023 08:02  Patient On (Oxygen Delivery Method): room air            PHYSICAL EXAM  General: NAD  Head: NC/AT; MMM; PERRL; EOMI;  Neck: Supple; no JVD  Respiratory: CTAB; no wheezes/rales/rhonchi  Cardiovascular: Regular rhythm/rate; S1/S2+, no murmurs, rubs gallops   Gastrointestinal: Soft; NTND; bowel sounds normal and present  Extremities: WWP; no edema/cyanosis  Neurological: A&Ox3, CNII-XII grossly intact; no obvious focal deficits  Skin: Clean and intact. Good skin turgor. Without open wounds and sores        08-16-23 @ 07:01  -  08-17-23 @ 07:00  --------------------------------------------------------  IN:  Total IN: 0 mL    OUT:    Voided (mL): 350 mL  Total OUT: 350 mL    Total NET: -350 mL      08-17-23 @ 07:01  -  08-17-23 @ 08:08  --------------------------------------------------------  IN:  Total IN: 0 mL    OUT:    Indwelling Catheter - Urethral (mL): 375 mL  Total OUT: 375 mL    Total NET: -375 mL          LABS:                          9.1    6.21  )-----------( 188      ( 17 Aug 2023 03:08 )             29.6                                               08-17    141  |  104  |  14  ----------------------------<  104<H>  4.0   |  26  |  1.31<H>    Ca    9.1      17 Aug 2023 03:08  Mg     1.8     08-17    TPro  7.3  /  Alb  3.9  /  TBili  0.6  /  DBili  x   /  AST  28  /  ALT  14  /  AlkPhos  57  08-17      PT/INR - ( 17 Aug 2023 03:08 )   PT: 11.8 sec;   INR: 1.04          PTT - ( 17 Aug 2023 03:08 )  PTT:30.5 sec                                       Urinalysis Basic - ( 17 Aug 2023 03:08 )    Color: x / Appearance: x / SG: x / pH: x  Gluc: 104 mg/dL / Ketone: x  / Bili: x / Urobili: x   Blood: x / Protein: x / Nitrite: x   Leuk Esterase: x / RBC: x / WBC x   Sq Epi: x / Non Sq Epi: x / Bacteria: x                                                  LIVER FUNCTIONS - ( 17 Aug 2023 03:08 )  Alb: 3.9 g/dL / Pro: 7.3 g/dL / ALK PHOS: 57 U/L / ALT: 14 U/L / AST: 28 U/L / GGT: x                                                                                               Mode: AC/ CMV (Assist Control/ Continuous Mandatory Ventilation)  RR (machine): 12  TV (machine): 450  FiO2: 50  PEEP: 5  ITime: 1  MAP: 7.2  PIP: 17                                          MEDICATIONS  (STANDING):  chlorhexidine 0.12% Liquid 15 milliLiter(s) Oral Mucosa every 12 hours  propofol Infusion 0.5 MICROgram(s)/kG/Min (0.24 mL/Hr) IV Continuous <Continuous>    MEDICATIONS  (PRN):         ==========ICU Consult ==========    Consult reason: AHRF    HPI:  89 y.o M w/ PMHx of recently diagnosed esophageal cancer, aspirations and vocal cord paralysis presents to the ED for 1 day of worsening dyspnea. Pt recently presented to [facility] on 08/08 for 3 months of hoarseness and difficulty with swallowing was evaluated by ENT, found to have esophageal mass/cancer with R vocal cord paralysis. Pt was evaluated by S&S that required MBSS for further recommendation. This AM pt presented to the ED for worsening shortness of breath and increasing lethargy/fatigue. On arrival pt was found to be hypoxic to 90% breathing at 27/min. BiPAP was initiated however pt became more lethargic w/ AMS and worsening CO2 retention. Pt was intubated and MICU consulted.     In the ED:  Initial vital signs: T: 98.3 R: 101 : XX, R: XX, SpO2: XX% on RA  Labs: significant for  Imaging:  CXR:   EKG:   Medications:   Consults: none       PAST MEDICAL & SURGICAL HISTORY:  HTN (hypertension)      History of intracranial aneurysm      Esophageal mass          PAST MEDICAL & SURGICAL HISTORY:  HTN (hypertension)    History of intracranial aneurysm    Esophageal mass          Allergies    No Known Allergies    Intolerances      Home Medications:        PAST MEDICAL & SURGICAL HISTORY:  HTN (hypertension)      History of intracranial aneurysm      Esophageal mass          FAMILY HISTORY:  :      ROS:  See HPI     ICU Vital Signs Last 24 Hrs  T(C): 36.8 (17 Aug 2023 02:45), Max: 36.8 (17 Aug 2023 02:45)  T(F): 98.3 (17 Aug 2023 02:45), Max: 98.3 (17 Aug 2023 02:45)  HR: 79 (17 Aug 2023 08:02) (71 - 101)  BP: 113/58 (17 Aug 2023 08:02) (103/60 - 191/98)  BP(mean): --  ABP: --  ABP(mean): --  RR: 18 (17 Aug 2023 08:02) (17 - 27)  SpO2: 97% (17 Aug 2023 08:02) (90% - 100%)    O2 Parameters below as of 17 Aug 2023 08:02  Patient On (Oxygen Delivery Method): room air            PHYSICAL EXAM  General: NAD  Head: NC/AT; MMM; PERRL; EOMI;  Neck: Supple; no JVD  Respiratory: CTAB; no wheezes/rales/rhonchi  Cardiovascular: Regular rhythm/rate; S1/S2+, no murmurs, rubs gallops   Gastrointestinal: Soft; NTND; bowel sounds normal and present  Extremities: WWP; no edema/cyanosis  Neurological: A&Ox3, CNII-XII grossly intact; no obvious focal deficits  Skin: Clean and intact. Good skin turgor. Without open wounds and sores        08-16-23 @ 07:01  -  08-17-23 @ 07:00  --------------------------------------------------------  IN:  Total IN: 0 mL    OUT:    Voided (mL): 350 mL  Total OUT: 350 mL    Total NET: -350 mL      08-17-23 @ 07:01  -  08-17-23 @ 08:08  --------------------------------------------------------  IN:  Total IN: 0 mL    OUT:    Indwelling Catheter - Urethral (mL): 375 mL  Total OUT: 375 mL    Total NET: -375 mL          LABS:                          9.1    6.21  )-----------( 188      ( 17 Aug 2023 03:08 )             29.6                                               08-17    141  |  104  |  14  ----------------------------<  104<H>  4.0   |  26  |  1.31<H>    Ca    9.1      17 Aug 2023 03:08  Mg     1.8     08-17    TPro  7.3  /  Alb  3.9  /  TBili  0.6  /  DBili  x   /  AST  28  /  ALT  14  /  AlkPhos  57  08-17      PT/INR - ( 17 Aug 2023 03:08 )   PT: 11.8 sec;   INR: 1.04          PTT - ( 17 Aug 2023 03:08 )  PTT:30.5 sec                                       Urinalysis Basic - ( 17 Aug 2023 03:08 )    Color: x / Appearance: x / SG: x / pH: x  Gluc: 104 mg/dL / Ketone: x  / Bili: x / Urobili: x   Blood: x / Protein: x / Nitrite: x   Leuk Esterase: x / RBC: x / WBC x   Sq Epi: x / Non Sq Epi: x / Bacteria: x                                                  LIVER FUNCTIONS - ( 17 Aug 2023 03:08 )  Alb: 3.9 g/dL / Pro: 7.3 g/dL / ALK PHOS: 57 U/L / ALT: 14 U/L / AST: 28 U/L / GGT: x                                                                                               Mode: AC/ CMV (Assist Control/ Continuous Mandatory Ventilation)  RR (machine): 12  TV (machine): 450  FiO2: 50  PEEP: 5  ITime: 1  MAP: 7.2  PIP: 17                                          MEDICATIONS  (STANDING):  chlorhexidine 0.12% Liquid 15 milliLiter(s) Oral Mucosa every 12 hours  propofol Infusion 0.5 MICROgram(s)/kG/Min (0.24 mL/Hr) IV Continuous <Continuous>    MEDICATIONS  (PRN):         ==========ICU Consult ==========    Consult reason: AHRF    HPI:  89 y.o M w/ PMHx of recently diagnosed esophageal cancer, aspirations and vocal cord paralysis presents to the ED for 1 day of worsening dyspnea. Pt recently presented to [facility] on 08/08 for 3 months of hoarseness and difficulty with swallowing was evaluated by ENT, found to have esophageal mass/cancer with R vocal cord paralysis. Pt was evaluated by S&S that required MBSS for further recommendation. This AM pt presented to the ED for worsening shortness of breath and increasing lethargy/fatigue. On arrival pt was found to be hypoxic to 90% breathing at 27/min. BiPAP was initiated however pt became more lethargic w/ AMS and worsening CO2 retention. Pt was intubated and MICU consulted.     In the ED:  Initial vital signs: T: 98.3 HR: 101, BP: 191/98, R: 27, SpO2: 90% on RA  Labs: significant for WBC 6.2, Hgb 9.1, Trop 256 -> 194, Na 141, K 4.0, Cr 1.31, lactate 1.8, BNP 1780, VpH 7.29 -> 7.08 -> 7.17  Imaging:  CXR:   EKG:   Medications:   Consults: none       PAST MEDICAL & SURGICAL HISTORY:  HTN (hypertension)      History of intracranial aneurysm      Esophageal mass          PAST MEDICAL & SURGICAL HISTORY:  HTN (hypertension)    History of intracranial aneurysm    Esophageal mass          Allergies    No Known Allergies    Intolerances      Home Medications:        PAST MEDICAL & SURGICAL HISTORY:  HTN (hypertension)      History of intracranial aneurysm      Esophageal mass          FAMILY HISTORY:  :      ROS:  See HPI     ICU Vital Signs Last 24 Hrs  T(C): 36.8 (17 Aug 2023 02:45), Max: 36.8 (17 Aug 2023 02:45)  T(F): 98.3 (17 Aug 2023 02:45), Max: 98.3 (17 Aug 2023 02:45)  HR: 79 (17 Aug 2023 08:02) (71 - 101)  BP: 113/58 (17 Aug 2023 08:02) (103/60 - 191/98)  BP(mean): --  ABP: --  ABP(mean): --  RR: 18 (17 Aug 2023 08:02) (17 - 27)  SpO2: 97% (17 Aug 2023 08:02) (90% - 100%)    O2 Parameters below as of 17 Aug 2023 08:02  Patient On (Oxygen Delivery Method): room air            PHYSICAL EXAM  General: NAD  Head: NC/AT; MMM; PERRL; EOMI;  Neck: Supple; no JVD  Respiratory: CTAB; no wheezes/rales/rhonchi  Cardiovascular: Regular rhythm/rate; S1/S2+, no murmurs, rubs gallops   Gastrointestinal: Soft; NTND; bowel sounds normal and present  Extremities: WWP; no edema/cyanosis  Neurological: A&Ox3, CNII-XII grossly intact; no obvious focal deficits  Skin: Clean and intact. Good skin turgor. Without open wounds and sores        08-16-23 @ 07:01  -  08-17-23 @ 07:00  --------------------------------------------------------  IN:  Total IN: 0 mL    OUT:    Voided (mL): 350 mL  Total OUT: 350 mL    Total NET: -350 mL      08-17-23 @ 07:01  -  08-17-23 @ 08:08  --------------------------------------------------------  IN:  Total IN: 0 mL    OUT:    Indwelling Catheter - Urethral (mL): 375 mL  Total OUT: 375 mL    Total NET: -375 mL          LABS:                          9.1    6.21  )-----------( 188      ( 17 Aug 2023 03:08 )             29.6                                               08-17    141  |  104  |  14  ----------------------------<  104<H>  4.0   |  26  |  1.31<H>    Ca    9.1      17 Aug 2023 03:08  Mg     1.8     08-17    TPro  7.3  /  Alb  3.9  /  TBili  0.6  /  DBili  x   /  AST  28  /  ALT  14  /  AlkPhos  57  08-17      PT/INR - ( 17 Aug 2023 03:08 )   PT: 11.8 sec;   INR: 1.04          PTT - ( 17 Aug 2023 03:08 )  PTT:30.5 sec                                       Urinalysis Basic - ( 17 Aug 2023 03:08 )    Color: x / Appearance: x / SG: x / pH: x  Gluc: 104 mg/dL / Ketone: x  / Bili: x / Urobili: x   Blood: x / Protein: x / Nitrite: x   Leuk Esterase: x / RBC: x / WBC x   Sq Epi: x / Non Sq Epi: x / Bacteria: x                                                  LIVER FUNCTIONS - ( 17 Aug 2023 03:08 )  Alb: 3.9 g/dL / Pro: 7.3 g/dL / ALK PHOS: 57 U/L / ALT: 14 U/L / AST: 28 U/L / GGT: x                                                                                               Mode: AC/ CMV (Assist Control/ Continuous Mandatory Ventilation)  RR (machine): 12  TV (machine): 450  FiO2: 50  PEEP: 5  ITime: 1  MAP: 7.2  PIP: 17                                          MEDICATIONS  (STANDING):  chlorhexidine 0.12% Liquid 15 milliLiter(s) Oral Mucosa every 12 hours  propofol Infusion 0.5 MICROgram(s)/kG/Min (0.24 mL/Hr) IV Continuous <Continuous>    MEDICATIONS  (PRN):         ==========ICU Consult ==========    Consult reason: AHRF    HPI:  89 y.o M w/ PMHx of recently diagnosed esophageal cancer, aspirations and vocal cord paralysis presents to the ED for 1 day of worsening dyspnea. Pt recently presented to [facility] on 08/08 for 3 months of hoarseness and difficulty with swallowing was evaluated by ENT, found to have esophageal mass/cancer with R vocal cord paralysis. Pt was evaluated by S&S that required MBSS for further recommendation. This AM pt presented to the ED for worsening shortness of breath and increasing lethargy/fatigue. On arrival pt was found to be hypoxic to 90% breathing at 27/min. BiPAP was initiated however pt became more lethargic w/ AMS and worsening CO2 retention. Pt was intubated and MICU consulted.     In the ED:  Initial vital signs: T: 98.3 HR: 101, BP: 191/98, R: 27, SpO2: 90% on RA  Labs: significant for WBC 6.2, Hgb 9.1, Trop 256 -> 194, Na 141, K 4.0, Cr 1.31, lactate 1.8, BNP 1780, VpH 7.29 -> 7.08 -> 7.17  Imaging:  CTA chest: There are new, bibasilar patchy lung opacities since prior CT chest 8/8/2023, most suggestive of pneumonia. Stable upper thoracic esophageal mass and stable lymphadenopathy, likely metastatic. Negative for pulmonary embolus.  EKG: NSR, LBBB   Medications: CTX 2g,  Azithro 500, Mg 2g, Solumedrol 125mg, duonebs  Consults: MICU      PAST MEDICAL & SURGICAL HISTORY:  HTN (hypertension)      History of intracranial aneurysm      Esophageal mass          PAST MEDICAL & SURGICAL HISTORY:  HTN (hypertension)    History of intracranial aneurysm    Esophageal mass          Allergies    No Known Allergies    Intolerances      Home Medications:        PAST MEDICAL & SURGICAL HISTORY:  HTN (hypertension)      History of intracranial aneurysm      Esophageal mass          FAMILY HISTORY:  :      ROS:  See HPI     ICU Vital Signs Last 24 Hrs  T(C): 36.8 (17 Aug 2023 02:45), Max: 36.8 (17 Aug 2023 02:45)  T(F): 98.3 (17 Aug 2023 02:45), Max: 98.3 (17 Aug 2023 02:45)  HR: 79 (17 Aug 2023 08:02) (71 - 101)  BP: 113/58 (17 Aug 2023 08:02) (103/60 - 191/98)  BP(mean): --  ABP: --  ABP(mean): --  RR: 18 (17 Aug 2023 08:02) (17 - 27)  SpO2: 97% (17 Aug 2023 08:02) (90% - 100%)    O2 Parameters below as of 17 Aug 2023 08:02  Patient On (Oxygen Delivery Method): room air            PHYSICAL EXAM  General: intubated and sedated   Head: MMM; PERRL  Neck: Supple; no JVD  Respiratory: CTAB; no wheezes/rales/rhonchi  Cardiovascular: Regular rhythm/rate; S1/S2+, no murmurs, rubs gallops   Gastrointestinal: Soft; NTND; bowel sounds normal and present  Extremities: WWP; no edema/cyanosis  Neurological: A&Ox0  Skin: Clean and intact. Good skin turgor. Without open wounds and sores        08-16-23 @ 07:01  -  08-17-23 @ 07:00  --------------------------------------------------------  IN:  Total IN: 0 mL    OUT:    Voided (mL): 350 mL  Total OUT: 350 mL    Total NET: -350 mL      08-17-23 @ 07:01  -  08-17-23 @ 08:08  --------------------------------------------------------  IN:  Total IN: 0 mL    OUT:    Indwelling Catheter - Urethral (mL): 375 mL  Total OUT: 375 mL    Total NET: -375 mL          LABS:                          9.1    6.21  )-----------( 188      ( 17 Aug 2023 03:08 )             29.6                                               08-17    141  |  104  |  14  ----------------------------<  104<H>  4.0   |  26  |  1.31<H>    Ca    9.1      17 Aug 2023 03:08  Mg     1.8     08-17    TPro  7.3  /  Alb  3.9  /  TBili  0.6  /  DBili  x   /  AST  28  /  ALT  14  /  AlkPhos  57  08-17      PT/INR - ( 17 Aug 2023 03:08 )   PT: 11.8 sec;   INR: 1.04          PTT - ( 17 Aug 2023 03:08 )  PTT:30.5 sec                                       Urinalysis Basic - ( 17 Aug 2023 03:08 )    Color: x / Appearance: x / SG: x / pH: x  Gluc: 104 mg/dL / Ketone: x  / Bili: x / Urobili: x   Blood: x / Protein: x / Nitrite: x   Leuk Esterase: x / RBC: x / WBC x   Sq Epi: x / Non Sq Epi: x / Bacteria: x                                                  LIVER FUNCTIONS - ( 17 Aug 2023 03:08 )  Alb: 3.9 g/dL / Pro: 7.3 g/dL / ALK PHOS: 57 U/L / ALT: 14 U/L / AST: 28 U/L / GGT: x                                                                                               Mode: AC/ CMV (Assist Control/ Continuous Mandatory Ventilation)  RR (machine): 12  TV (machine): 450  FiO2: 50  PEEP: 5  ITime: 1  MAP: 7.2  PIP: 17                                          MEDICATIONS  (STANDING):  chlorhexidine 0.12% Liquid 15 milliLiter(s) Oral Mucosa every 12 hours  propofol Infusion 0.5 MICROgram(s)/kG/Min (0.24 mL/Hr) IV Continuous <Continuous>    MEDICATIONS  (PRN):

## 2023-08-17 NOTE — ED PROCEDURE NOTE - CPROC ED GASTRIC INTUB DETAIL1
The orogastric tube (see size above) was inserted via the anatomic location./Gastric tube connected to low continuous suction.

## 2023-08-17 NOTE — ED ADULT NURSE REASSESSMENT NOTE - NS ED NURSE REASSESS COMMENT FT1
Received patient. ET tube placed well w/o kinked, 25cm on upper lip. Urine drainaged t/ ching 14Fr. continued to monitor.
Patient noted to be less arousable with vitals as charted, repeat lab work sent. MD and RN at bedside with patient, decision made to intubate patient. Patient intubated with 7.5 ETT, 24 at the lip, started on prop gtt.

## 2023-08-17 NOTE — H&P ADULT - CRITICAL CARE ATTENDING COMMENT
Acute hypoxemic, hypercarbic respiratory failure requiring mechanical ventilation. New recent diagnosis of esophageal cancer c/b vocal cord paralysis due to compression. C/b aspiration pneumonia. GOC discussion in progress regarding code status, further chemo if offered. Plan as per above.

## 2023-08-17 NOTE — PATIENT PROFILE ADULT - FUNCTIONAL ASSESSMENT - BASIC MOBILITY 5.
Palliative Care Progress Note    Reason for Palliative Care: Goals of Care/Level of Care     Subjective   Seen this morning, Delilah is awake and resting in bed.  She reports moving her bowels this morning, and passing gas.  She has no abdominal pain or nausea, but does have significant pain in her knees, bilaterally. This is chronic pain, d/t her arthritis. She normally uses norco 10/325mg for this (about 6 times a day)            Objective     Vital Signs:   Vital Last Value (24 Hour) 24 Hour Range   Temperature 98.1 °F (36.7 °C) (01/07/22 1112) Temp  Min: 97.3 °F (36.3 °C)  Max: 98.8 °F (37.1 °C)   Pulse 64 (01/07/22 1112) Pulse  Min: 64  Max: 80   Arterial   Blood Pressure   No data recorded   Non-Invasive  Blood Pressure (!) 151/70 (01/07/22 1112) BP  Min: 117/64  Max: 151/70   Central Venous Pressure   No data recorded   Respiratory 18 (01/07/22 1112) Resp  Min: 16  Max: 18   SpO2 95 % (01/07/22 1112) SpO2  Min: 92 %  Max: 98 %     Physical Exam  Vitals and nursing note reviewed.   Constitutional:       Appearance: She is obese.   HENT:      Head: Normocephalic and atraumatic.      Nose:      Comments: NGT in place to LIS, draining dark brown bile     Mouth/Throat:      Mouth: Mucous membranes are moist.      Pharynx: Oropharynx is clear.   Eyes:      Pupils: Pupils are equal, round, and reactive to light.   Cardiovascular:      Rate and Rhythm: Normal rate and regular rhythm.   Pulmonary:      Effort: Pulmonary effort is normal.      Breath sounds: Normal breath sounds.   Abdominal:      General: Bowel sounds are normal.      Palpations: Abdomen is soft.   Musculoskeletal:         General: Normal range of motion.      Cervical back: Normal range of motion.   Skin:     General: Skin is warm and dry.   Neurological:      Mental Status: She is alert and oriented to person, place, and time.   Psychiatric:         Mood and Affect: Mood normal.         Behavior: Behavior normal.         Labs & Imaging  Recent Labs    Lab 01/06/22  0506 01/05/22  1305   SODIUM 140 137   POTASSIUM 4.0 4.6   CHLORIDE 104 101   CO2 31 31   BUN 28* 25*   CREATININE 0.58 0.60   CALCIUM 8.7 9.5   ALBUMIN 2.2* 2.8*   BILIRUBIN 1.3* 1.1*   ALKPT 55 80   GPT 23 31   AST 12 31   GLUCOSE 134* 162*      Recent Labs   Lab 01/06/22  0506   WBC 5.7   RBC 4.14   HGB 13.8   HCT 41.7   *        Results for orders placed or performed during the hospital encounter of 01/05/22 (from the past 72 hour(s))   CT ABDOMEN PELVIS W CONTRAST - IV contrast only    Impression    1.   Small bowel obstruction, with the majority of the small bowel loops  within a large ventral hernia. Transitional point is within the hernia,  subjacent to the peritoneal reflection near the umbilicus.  2.   Findings likely representing stercoral colitis, with large formed  stool in the rectal vault. This should be disimpacted.  3.   Severe biliary tree dilation, and dilation of what is possibly the  gallbladder. Abrupt caliber change of the distal common bile duct at the  ampulla. This could be due to to an obstructing lesion or calculus, and can  be further evaluated with MRCP, when clinically appropriate.  4.   Multiple scattered hypodense pancreatic lesions are indeterminate, and  also can be further characterized with MRCP.  5.   Bibasilar groundglass opacities and atelectasis, with small bilateral  pleural effusions. Cardiomegaly with small pericardial effusion. Severe  atherosclerosis.  6.   Speckled appearance of the spleen, with multiple small hypodensities.  7.   Multiple other incidental and nonacute findings, as described above.    Electronically Signed by: NITA SANCHEZ M.D.   Signed on: 1/5/2022 3:53 PM      XR Abdomen Series W Single View Chest    Impression    1.  Left retrocardiac density may represent pneumonia and/or atelectasis.  2.  No bowel obstruction.    Electronically Signed by: PETER PEREIRA M.D.   Signed on: 1/6/2022 1:07 PM           Assessment   Ms. Mazariegos  Hernán is a 89 yo woman with  -Abdominal pain  -Small bowel obstruction  -Constipation  -Incisional hernia  Plan   Patient voices she would not wish for surgery, or other aggressive workup.   -Her primary goal moving forward is pain management   -She would prefer to be at home, and not return to the hospital in the future    Code status updated to Full LET (POLST on file indicates this, confirmed today with Ms. Jim)    Son, Yazan Jim is HCPOA, awaiting him to email copy of this form    Ongoing pain management, balance pain control with concern for obstruction   -She was previously taking fentanyl duragesic 112mcg/hr   -norco 10/325mg PRN (using 6 times per day per patient)   -Currently on Fentanyl duragesic 12mcg/hr and PRN toradol    -Give her chronic pain history and history of opioids, this is likely not enough to control her pain    Lactose intolerance per family    Advance Care Planning/Goals of Care   Medical Decision-making capacity: Yes   Spoke today on the phone with patient's daughter, and, on the phone for 20 minutes.  Provided medical update, also provided details of conversation that I had with her mother at bedside regarding goals of care.  And tells me that the whole family would need to be involved in conversation regarding potential surgery, acknowledged this, but also informed her that her mother has the ability to make her own medical decisions at this time and that we will be inclined to honor them.  She ultimately agrees with and endorses her mother's wish for comfort focused care moving forward.  She tells me she and her brother, Yazan, are not on speaking terms, and that actually Yazan is her mother's HC POA.  Apparently Yazan does not use his cell phone, and griffin recommended  That I call his house phone.    I then called Yazan's house phone and spoke with his wife, Vipul.  She affirmed that Yazan is HC POA and will email me this paperwork.  Also provided medical update as well as details of  goals of care conversation held with mother-in-law.  Vipul also agrees primary goal moving forward is comfort focused, remain at home, possibly with help of hospice if indicated.      Total combined time for today's Face to Face encounter was 35 minutes, with > 50% of that time spent counseling and coordinating care regarding the above.  Additional time spent Advance Care Plannin minutes.  Discussed With: Dr. Banks, RN    Thank you for involving Palliative and Supportive Care. Please contact the covering provider via [employer] with further questions or concerns.    Tila Queen CNP      Metrics          LVAD: No  #1 Post-Visit: No  Reason if No #1 on Chart Prior to Discharge from Palliative Care: N/A  #2 Post-Visit: Yes  #3 Post-Visit: Yes      1 = Total assistance

## 2023-08-17 NOTE — PATIENT PROFILE ADULT - NSPROEXTENSIONSOFSELF_GEN_A_NUR
Patient of [place]  Last seen by Fatuma Bustillos at [place] office  History Nephrolithiasis in the setting of hypercalcemia, small stone burden  Last seen 5/9/2018, provider stressed proper hydration and ordered for  FU 1 year with KUB and U/S  Patient had CT renal study on 02/14/2020 which showed    1   6 mm left mid ureteral stone causes mild hydronephrosis  2  There are tiny punctate calculi in the distal right ureter, present in retrospect on the prior study and stable   Follow up appointment 02/21/2020  Spoke with Patient who reported intermittent Left flank pain currently 3/10 but rising to 8/10 at times  Patient is taking Flomax and Ibuprofen as prescribed with some relief  Patient denies hematuria, frequency, urgency or severe pain  Does report "a little nausea" starting last night and continuing today and a "big headache"  Patient also states that she had a fever "off and on" last week but denies fever today  Patient encouraged to hydrate well and given ER precautions  Patient repeats back understanding  Routing to provider to review and advise  dentures

## 2023-08-17 NOTE — CONSULT NOTE ADULT - SUBJECTIVE AND OBJECTIVE BOX
CARLOS CALDERA          MRN-[id number]            (10/09/1932)    HPI:  89 yo M PMH recently diagnosed esophageal cancer, aspirations and vocal cord paralysis who presented to the ED for 1 day of worsening dyspnea. Pt recently presented to [facility] on  for 3 months of hoarseness and difficulty swallowing, evaluated by ENT, found to have esophageal mass/cancer with R vocal cord paralysis. This AM pt presented to the ED for worsening shortness of breath and increasing lethargy/fatigue. On arrival pt was found to be hypoxic to 90% breathing at 27/min. BiPAP was initiated however pt became more lethargic w/ AMS and worsening CO2 retention. Pt was intubated and admitted to the MICU.     In the ED:  Initial vital signs: T: 98.3 HR: 101, BP: 191/98, R: 27, SpO2: 90% on RA  Labs: significant for WBC 6.2, Hgb 9.1, Trop 256 -> 194, Na 141, K 4.0, Cr 1.31, lactate 1.8, BNP 1780, VpH 7.29 -> 7.08 -> 7.17  Imaging: CTA chest: New, bibasilar patchy lung opacities since prior CT chest 2023, most suggestive of pneumonia. Stable upper thoracic esophageal mass and stable lymphadenopathy, likely metastatic. Negative for pulmonary embolus.  EKG: NSR, LBBB   Medications: CTX 2g,  Azithro 500, Mg 2g, Solumedrol 125mg, duonebs    In the MICU patient arrived intubated on propofol, in NAD, and was transitioned to zosyn.   Patient's family was called to let them know pt was in the hospital and to collect collateral on the patient. The patient's wife Shilpi noted that Mr. Caldera  was scheduled to get a biopsy of his left supraclavicular node that was noted to be enlarged, biopsy was scheduled for tomorrow . Spoke with Mr. Caldera's granddaughter as well who worked as a palliative care nurse, she stated that they had family discussions prior to this admission/event regarding Mr. Caldera's goals of care and health plans moving forward. She noted that Mr. Caldera believed he was "indio to be alive" and was open to continued discussions about how he wanted his life/health care plans to play out.  (17 Aug 2023 10:18)    PAST MEDICAL & SURGICAL HISTORY:  HTN (hypertension)      History of intracranial aneurysm      Esophageal mass      FAMILY HISTORY: unable to obtain  due to intubation and sedation        SOCIAL HISTORY: : unable to obtain  due to intubation and sedation     ROS:    Unable to attain due to:  due to intubation and sedation                     Dyspnea (Linda 0-10):     0                   N/V (Y/N):           N                  Secretions (Y/N) :           N     Agitation(Y/N): N  Pain (Y/N):     N  -Provocation/Palliation: n/a   -Quality/Quantity: n/a   -Radiating: n/a   -Severity: n/a   -Timing/Frequency: n/a   -Impact on ADLs: n/a     General:  unable to obtain   HEENT:     unable to obtain   Neck:  unable to obtain   CVS:   unable to obtain   Resp:   unable to obtain   GI:   unable to obtain   :  unable to obtain   Musc:   unable to obtain   Neuro:  unable to obtain   Psych:   unable to obtain   Skin:   unable to obtain   Lymph:   unable to obtain     ALLERGIES:  Allergies    No Known Allergies    Intolerances        Opiate Naive (Y/N): Y  -iStop reviewed (Y/N): Y (Ref#:    [id number]        )    MEDICATIONS:      MEDICATIONS  (STANDING):  albuterol/ipratropium for Nebulization 3 milliLiter(s) Nebulizer every 6 hours  chlorhexidine 0.12% Liquid 15 milliLiter(s) Oral Mucosa every 12 hours  chlorhexidine 4% Liquid 1 Application(s) Topical <User Schedule>  heparin   Injectable 5000 Unit(s) SubCutaneous every 8 hours  piperacillin/tazobactam IVPB.- 4.5 Gram(s) IV Intermittent once  piperacillin/tazobactam IVPB.. 4.5 Gram(s) IV Intermittent every 8 hours  propofol Infusion 0.5 MICROgram(s)/kG/Min (0.24 mL/Hr) IV Continuous <Continuous>    MEDICATIONS  (PRN):      LABS:    CBC:                        9.1    6.21  )-----------( 188      ( 17 Aug 2023 03:08 )             29.6     CMP:        141  |  104  |  14  ----------------------------<  104<H>  4.0   |  26  |  1.31<H>    Ca    9.1      17 Aug 2023 03:08  Mg     1.8         TPro  7.3  /  Alb  3.9  /  TBili  0.6  /  DBili  x   /  AST  28  /  ALT  14  /  AlkPhos  57      PT/INR - ( 17 Aug 2023 03:08 )   PT: 11.8 sec;   INR: 1.04          PTT - ( 17 Aug 2023 03:08 )  PTT:30.5 sec  Urinalysis Basic - ( 17 Aug 2023 03:08 )    Color: x / Appearance: x / SG: x / pH: x  Gluc: 104 mg/dL / Ketone: x  / Bili: x / Urobili: x   Blood: x / Protein: x / Nitrite: x   Leuk Esterase: x / RBC: x / WBC x   Sq Epi: x / Non Sq Epi: x / Bacteria: x    IMAGING:    < from: CT Angio Chest PE Protocol w/ IV Cont (23 @ 04:41) >  ACC: [id number] EXAM:  CT ANGIO CHEST PULM ART WAWIC   ORDERED BY: RAFAELA MASCORRO     PROCEDURE DATE:  2023      IMPRESSION:  There are new, bibasilar patchy lung opacities since prior CT chest   2023, most suggestive of pneumonia.    Stable upper thoracic esophageal mass and stable lymphadenopathy, likely   metastatic.    Negative for pulmonary embolus.      < from: Xray Chest 1 View-PORTABLE IMMEDIATE (Xray Chest 1 View-PORTABLE IMMEDIATE .) (23 @ 07:20) >    ACC: [id number] EXAM:  XR CHEST PORTABLE IMMED 1V   ORDERED BY: RAFAELA MASCORRO     PROCEDURE DATE:  2023    IMPRESSION:    Endotracheal tube in good position. Nasogastric tube courses off image   below hemidiaphragm. Small bibasilar focal atelectasis/infiltrates. Upper   lungs clear. No pneumothorax. Cannot exclude small pleural effusions.    < end of copied text >    PHYSICAL EXAM:  T(C): 36.5 (23 @ 14:22), Max: 36.8 (23 @ 02:45)  HR: 80 (23 @ 15:20) (58 - 101)  BP: 113/55 (23 @ 14:00) (85/50 - 191/98)  RR: 20 (23 @ 14:00) (17 - 28)  SpO2: 100% (23 @ 15:20) (90% - 100%)  Wt(kg): 78.9kg  Daily Height in cm: 167.64 (17 Aug 2023 02:45)    Daily   CAPILLARY BLOOD GLUCOSE      I&O's Summary    16 Aug 2023 07:01  -  17 Aug 2023 07:00  --------------------------------------------------------  IN: 0 mL / OUT: 350 mL / NET: -350 mL    17 Aug 2023 07:01  -  17 Aug 2023 15:56  --------------------------------------------------------  IN: 673 mL / OUT: 670 mL / NET: 3 mL      GENERAL:  [ ]Alert  [ ]Oriented x   [ x]Lethargic due to sedation  [ ]Cachexia [ ]Verbal  [ x]Non-Verbal  Behavioral:   [ ] Anxiety  [ ] Delirium [ ] Agitation [x ] Other - calm  HEENT:  [ ]Normal   [ ]Dry mouth   [x ]ET Tube  [ ]Oral lesions  PULMONARY:   [ ]Clear  [ ]Tachypnea  [ ]Audible excessive secretions   [x ]Rhonchi     [ ]Right [ ]Left [ ]Bilateral  [ ]Crackles        [ ]Right [ ]Left [ ]Bilateral  [ ]Wheezing     [ ]Right [ ]Left [ ]Bilateral  CARDIOVASCULAR:    [x ]Regular [ ]Irregular [ ]Tachy  [ ]Tree [ ]Murmur [ ]Other  GASTROINTESTINAL:  [ ]Soft  [ ]Distended   [ ]+BS  [ ]Non tender [ ]Tender  [ ]PEG [ ]OGT/NGT  [] flexiseal with output  Last BM:   GENITOURINARY:  [ ]Normal [ ] Incontinent   [ ]Oliguria/Anuria   [x ]Mulligan  MUSCULOSKELETAL:   [ ]Normal   [ ]Weakness  [ ]Bed/Wheelchair bound [ ]Edema  NEUROLOGIC:   [ ]No focal deficits  [ ] Cognitive impairment  [ ] Dysphagia [ ]Dysarthria [ ] Paresis [  ]Other   SKIN:   [ ]Normal   [ ]Pressure ulcer(s)  [ ]Rash     Preadmit Karnofsky: 80 %           Current Karnofsky:     20%  Cachexia (Y/N): N  BMI: 28.1kg/m2     ADVANCED DIRECTIVES:    DNR/ DNI     No documented HCP form found on [facility]     No Living will / POA / Advance directives found on [place] / [facility].     No documented GOC notes on [place]    DECISION MAKER: The patient is not able to participate in symptomatic assessment or make make complex medical decisions  LEGAL SURROGATE: No documented HCP in paper chart / [place] / [facility].  Alternate surrogate:     Wife/Caregiver: Shilpi Caldera  Phone: [phone]    Granddaughter/Emergency Contact (lives in [place]): Nanette Soto  Phone: [phone]    Son (lives in [place]): Carlos Caldera  Phone: [phone]    Brother (lives in [place]): Britton Caldera  Phone: [phone]    Granddaughter (lives in [place]): Agnieszka Khalif  Phone: [phone]    GOALS OF CARE DISCUSSION:       Palliative care info/counseling provided	           Family meeting       Advanced Directives addressed please see Advance Care Planning Note	           See previous Palliative Medicine Note       Documentation of GOC: DNR/DNI	          REFERRALS:	        Unit SW/Case Mgmt       PT/OT

## 2023-08-17 NOTE — CONSULT NOTE ADULT - PROBLEM SELECTOR RECOMMENDATION 4
Patient is known to Dr. Priest and was planning to get a biopsy in the community as this is what the patient wanted. Per patients granddaughters at the bedside plan to get biopsy. Patient son is on the way from [place] and will be in the morning. Appreciate Oncology involvement.

## 2023-08-17 NOTE — PROCEDURE NOTE - NSUSCPTCODES_ED_ALL
37651 US Chest (PTX, Pleural Effussion/CHF vs COPD)
98584 Echocardiography Transthoracic with Image 2D (Echo/FAST)
19639 Duplex Scan of Extremity Veins (Unilateral)

## 2023-08-17 NOTE — H&P ADULT - HISTORY OF PRESENT ILLNESS
89 yo M w/ PMHx of recently diagnosed esophageal cancer, aspirations and vocal cord paralysis presents to the ED for 1 day of worsening dyspnea. Pt recently presented to [facility] on 08/08 for 3 months of hoarseness and difficulty with swallowing was evaluated by ENT, found to have esophageal mass/cancer with R vocal cord paralysis. Pt was evaluated by S&S that required MBSS for further recommendation. This AM pt presented to the ED for worsening shortness of breath and increasing lethargy/fatigue. On arrival pt was found to be hypoxic to 90% breathing at 27/min. BiPAP was initiated however pt became more lethargic w/ AMS and worsening CO2 retention. Pt was intubated and MICU consulted.     In the ED:  Initial vital signs: T: 98.3 HR: 101, BP: 191/98, R: 27, SpO2: 90% on RA  Labs: significant for WBC 6.2, Hgb 9.1, Trop 256 -> 194, Na 141, K 4.0, Cr 1.31, lactate 1.8, BNP 1780, VpH 7.29 -> 7.08 -> 7.17  Imaging:  CTA chest: There are new, bibasilar patchy lung opacities since prior CT chest 8/8/2023, most suggestive of pneumonia. Stable upper thoracic esophageal mass and stable lymphadenopathy, likely metastatic. Negative for pulmonary embolus.  EKG: NSR, LBBB   Medications: CTX 2g,  Azithro 500, Mg 2g, Solumedrol 125mg, duonebs  Consults: MICU    bipsy   started discussion on life plans, indio to be matt shukla 89 yo M PMH recently diagnosed esophageal cancer, aspirations and vocal cord paralysis who presented to the ED for 1 day of worsening dyspnea. Pt recently presented to [facility] on 08/08 for 3 months of hoarseness and difficulty swallowing, evaluated by ENT, found to have esophageal mass/cancer with R vocal cord paralysis. This AM pt presented to the ED for worsening shortness of breath and increasing lethargy/fatigue. On arrival pt was found to be hypoxic to 90% breathing at 27/min. BiPAP was initiated however pt became more lethargic w/ AMS and worsening CO2 retention. Pt was intubated and admitted to the MICU.     In the ED:  Initial vital signs: T: 98.3 HR: 101, BP: 191/98, R: 27, SpO2: 90% on RA  Labs: significant for WBC 6.2, Hgb 9.1, Trop 256 -> 194, Na 141, K 4.0, Cr 1.31, lactate 1.8, BNP 1780, VpH 7.29 -> 7.08 -> 7.17  Imaging: CTA chest: New, bibasilar patchy lung opacities since prior CT chest 8/8/2023, most suggestive of pneumonia. Stable upper thoracic esophageal mass and stable lymphadenopathy, likely metastatic. Negative for pulmonary embolus.  EKG: NSR, LBBB   Medications: CTX 2g,  Azithro 500, Mg 2g, Solumedrol 125mg, duonebs    In the MICU patient arrived intubated on propofol, in NAD, and was transitioned to zosyn.   Patient's family was called to let them know pt was in the hospital and to collect collateral on the patient. The patient's wife Shilpi noted that Mr. Frances  was scheduled to get a biopsy of his left supraclavicular node that was noted to be enlarged, biopsy was scheduled for tomorrow 8/18. Spoke with Mr. Frances's granddaughter as well who worked as a palliative care nurse, she stated that they had family discussions prior to this admission/event regarding Mr. Frances's goals of care and health plans moving forward. She noted that Mr. Frances believed he was "indio to be alive" and was open to continued discussions about how he wanted his life/health care plans to play out.

## 2023-08-17 NOTE — ED PROVIDER NOTE - NSICDXPASTMEDICALHX_GEN_ALL_CORE_FT
PAST MEDICAL HISTORY:  Esophageal mass     History of intracranial aneurysm     HTN (hypertension)

## 2023-08-17 NOTE — ED ADULT NURSE NOTE - OBJECTIVE STATEMENT
Patient is a 89yoM presenting to the ED with sob x 1hr pta. Pt is axox3, spont labored breathing on RA. Pt was recently dx with lung CA, endorsing worsening sob throughout the night, denies chest pain. Denies ha/dizziness, speaking in clear sentences.

## 2023-08-17 NOTE — CONSULT NOTE ADULT - PROBLEM SELECTOR RECOMMENDATION 3
Patient found to have b/l pneumonia, currently on Azithromycin, and ceftriaxone and Zosyn. Continue care as per ICU team.

## 2023-08-17 NOTE — ED ADULT NURSE NOTE - NSFALLUNIVINTERV_ED_ALL_ED
Bed/Stretcher in lowest position, wheels locked, appropriate side rails in place/Call bell, personal items and telephone in reach/Instruct patient to call for assistance before getting out of bed/chair/stretcher/Non-slip footwear applied when patient is off stretcher/Shirley to call system/Physically safe environment - no spills, clutter or unnecessary equipment/Purposeful proactive rounding/Room/bathroom lighting operational, light cord in reach

## 2023-08-17 NOTE — PATIENT PROFILE ADULT - FALL HARM RISK - RISK INTERVENTIONS

## 2023-08-17 NOTE — ED PROVIDER NOTE - OBJECTIVE STATEMENT
89M hx htn, intracranial aneurysm, c/o SOB. pt states worsening SOB tonight. no fevers. no vomiting. states has vocal cord paralysis. pt was recently diagnosed with esophageal mass and is being worked up by oncologist.

## 2023-08-17 NOTE — CONSULT NOTE ADULT - SUBJECTIVE AND OBJECTIVE BOX
Hematology Oncology Consult Note     89 year male with PMH of HTN, intracranial aneurysm, esophageal mass with lymphadenopathy who presents with shortness of breath.     Patient was recently seen by Dr. Priest in OP setting 8/15 after recent imaging findings of esophageal mass on CT scan 8/8. During hospital admission, patient presented to [facility] on 8/8 for 3 months of hoarseness and difficulty with swallowing. He initially had seen a pulmonologist who prescribed him an inhaler, though continueed to have episodes of aspiration. 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 Patient was evaluated by Speech and required MBSS for further recommendation.    In the ED this admission, patient's lab significant for WBC 6.2, Hgb 9.1, Trop elevated 256 --> 194, Na 141, K 4.0, Creatinine 1.31, Lactate 1.8, BNP 1780. Found to be aciditotic 7.29 --> 7.08 --> 7.17. Patient was intubated due to worsening mental status. He was treated with antibiotics (now on pip/tazo), solumedrol and duonebs.     Patient currently intubated/sedated, not requiring vasopressors. Functional status     Oncologic History  -he was initially seen by Dr. Mcguire on 7/7/23 with 3-month history of hoarseness and dysphagia and plan was to have MRI brain, neck, and barium swallow.  -presented to [facility] ED on 8/8/23 with hoarseness and worsening dysphagia x 5 days, he was unable to keep anything po down at that time.  -CT chest on 8/8/23 showed mass in the upper thoracic esophagus suspicious for neoplasm, 3 x 2.5 cm left supraclavicular lymphadenopathy. Left paratracheal lymph node measuring 1 cm in short axis. 2.5 x 2.2 cm retrocrural lymphadenopathy. Mildly enlarged bilateral hilar lymph nodes, nonspecific right adrenal nodule. Questionable 1.4 cm hypodense lesion in the upper pole of the right kidney. Consider follow-up MRI.  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), lives with his wife in [place]     PAST MEDICAL & SURGICAL HISTORY:  HTN (hypertension)  History of intracranial aneurysm  Esophageal mass    Allergies:  No Known Allergies  Medications:  heparin   Injectable 5000 Unit(s) SubCutaneous every 8 hours    Social History:    PHYSICAL EXAM:    T(F): 97.7 (08-17-23 @ 14:22), Max: 98.3 (08-17-23 @ 02:45)  HR: 80 (08-17-23 @ 15:20) (58 - 101)  BP: 113/55 (08-17-23 @ 14:00) (85/50 - 191/98)  RR: 20 (08-17-23 @ 14:00) (17 - 28)  SpO2: 100% (08-17-23 @ 15:20) (90% - 100%)  Wt(kg): --    Daily Height in cm: 167.64 (17 Aug 2023 02:45)    Daily     Gen: well developed, well nourished, comfortable  HEENT: normocephalic/atraumatic, no conjunctival pallor, no scleral icterus, no oral thrush/mucosal bleeding/mucositis  Neck: supple, no masses, no JVD  Breasts: deferred  Back: nontender  Cardiovascular: RR, nl S1S2, no murmurs/rubs/gallops  Respiratory: clear air entry b/l  Gastrointestinal: BS+, soft, NT/ND, no masses, no splenomegaly, no hepatomegaly, no evidence for ascites  Genitourinary: deferred  Rectal: deferred  Extremities: no clubbing/cyanosis, no edema, no calf tenderness  Vascular:  DP/PT 2+ b/l  Neurological: CN 2-12 grossly intact, no focal deficits  Skin: no rash on visible skin  Lymph Nodes:  no cervical/supraclavicular LAD, no axillary/groin LAD  Musculoskeletal:  full ROM  Psychiatric:  mood stable        Labs:                          9.1    6.21  )-----------( 188      ( 17 Aug 2023 03:08 )             29.6     CBC Full  -  ( 17 Aug 2023 03:08 )  WBC Count : 6.21 K/uL  RBC Count : 3.04 M/uL  Hemoglobin : 9.1 g/dL  Hematocrit : 29.6 %  Platelet Count - Automated : 188 K/uL  Mean Cell Volume : 97.4 fl  Mean Cell Hemoglobin : 29.9 pg  Mean Cell Hemoglobin Concentration : 30.7 gm/dL  Auto Neutrophil # : 3.99 K/uL  Auto Lymphocyte # : 1.24 K/uL  Auto Monocyte # : 0.75 K/uL  Auto Eosinophil # : 0.18 K/uL  Auto Basophil # : 0.03 K/uL  Auto Neutrophil % : 64.2 %  Auto Lymphocyte % : 20.0 %  Auto Monocyte % : 12.1 %  Auto Eosinophil % : 2.9 %  Auto Basophil % : 0.5 %    PT/INR - ( 17 Aug 2023 03:08 )   PT: 11.8 sec;   INR: 1.04          PTT - ( 17 Aug 2023 03:08 )  PTT:30.5 sec    08-17    141  |  104  |  14  ----------------------------<  104<H>  4.0   |  26  |  1.31<H>    Ca    9.1      17 Aug 2023 03:08  Mg     1.8     08-17    TPro  7.3  /  Alb  3.9  /  TBili  0.6  /  DBili  x   /  AST  28  /  ALT  14  /  AlkPhos  57  08-17      Urinalysis Basic - ( 17 Aug 2023 03:08 )    Color: x / Appearance: x / SG: x / pH: x  Gluc: 104 mg/dL / Ketone: x  / Bili: x / Urobili: x   Blood: x / Protein: x / Nitrite: x   Leuk Esterase: x / RBC: x / WBC x   Sq Epi: x / Non Sq Epi: x / Bacteria: x         Hematology Oncology Consult Note     89 year male with PMH of HTN, intracranial aneurysm, esophageal mass with lymphadenopathy who presents with shortness of breath.     Patient was recently seen by Dr. Priest in OP setting 8/15 after recent imaging findings of esophageal mass on CT scan 8/8. During hospital admission, patient presented to [facility] on 8/8 for 3 months of hoarseness and difficulty with swallowing. He initially had seen a pulmonologist who prescribed him an inhaler, though continueed to have episodes of aspiration. 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 Patient was evaluated by Speech and required MBSS for further recommendation.    In the ED this admission, patient's lab significant for WBC 6.2, Hgb 9.1, Trop elevated 256 --> 194, Na 141, K 4.0, Creatinine 1.31, Lactate 1.8, BNP 1780. Found to be aciditotic 7.29 --> 7.08 --> 7.17. Patient was intubated due to worsening mental status. He was treated with antibiotics (now on pip/tazo), solumedrol and duonebs.     Patient currently intubated/sedated, not requiring vasopressors. Functional status     Oncologic History  -he was initially seen by Dr. Mcguire on 7/7/23 with 3-month history of hoarseness and dysphagia and plan was to have MRI brain, neck, and barium swallow.  -presented to [facility] ED on 8/8/23 with hoarseness and worsening dysphagia x 5 days, he was unable to keep anything po down at that time.  -CT chest on 8/8/23 showed mass in the upper thoracic esophagus suspicious for neoplasm, 3 x 2.5 cm left supraclavicular lymphadenopathy. Left paratracheal lymph node measuring 1 cm in short axis. 2.5 x 2.2 cm retrocrural lymphadenopathy. Mildly enlarged bilateral hilar lymph nodes, nonspecific right adrenal nodule. Questionable 1.4 cm hypodense lesion in the upper pole of the right kidney. Consider follow-up MRI.  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), lives with his wife in [place]     PAST MEDICAL & SURGICAL HISTORY:  HTN (hypertension)  History of intracranial aneurysm  Esophageal mass    Allergies:  No Known Allergies  Medications:  heparin   Injectable 5000 Unit(s) SubCutaneous every 8 hours    Social History:    PHYSICAL EXAM:    T(F): 97.7 (08-17-23 @ 14:22), Max: 98.3 (08-17-23 @ 02:45)  HR: 80 (08-17-23 @ 15:20) (58 - 101)  BP: 113/55 (08-17-23 @ 14:00) (85/50 - 191/98)  RR: 20 (08-17-23 @ 14:00) (17 - 28)  SpO2: 100% (08-17-23 @ 15:20) (90% - 100%)  Wt(kg): --    Daily Height in cm: 167.64 (17 Aug 2023 02:45)    Daily     Gen: well developed, well nourished, comfortable  HEENT: normocephalic/atraumatic, no conjunctival pallor, no scleral icterus, no oral thrush/mucosal bleeding/mucositis  Neck: supple, no masses, no JVD  Breasts: deferred  Back: nontender  Cardiovascular: RR, nl S1S2, no murmurs/rubs/gallops  Respiratory: clear air entry b/l  Gastrointestinal: BS+, soft, NT/ND, no masses, no splenomegaly, no hepatomegaly, no evidence for ascites  Genitourinary: deferred  Rectal: deferred  Extremities: no clubbing/cyanosis, no edema, no calf tenderness  Vascular:  DP/PT 2+ b/l  Neurological: CN 2-12 grossly intact, no focal deficits  Skin: no rash on visible skin  Lymph Nodes:  no cervical/supraclavicular LAD, no axillary/groin LAD  Musculoskeletal:  full ROM  Psychiatric:  mood stable        Labs:                          9.1    6.21  )-----------( 188      ( 17 Aug 2023 03:08 )             29.6     CBC Full  -  ( 17 Aug 2023 03:08 )  WBC Count : 6.21 K/uL  RBC Count : 3.04 M/uL  Hemoglobin : 9.1 g/dL  Hematocrit : 29.6 %  Platelet Count - Automated : 188 K/uL  Mean Cell Volume : 97.4 fl  Mean Cell Hemoglobin : 29.9 pg  Mean Cell Hemoglobin Concentration : 30.7 gm/dL  Auto Neutrophil # : 3.99 K/uL  Auto Lymphocyte # : 1.24 K/uL  Auto Monocyte # : 0.75 K/uL  Auto Eosinophil # : 0.18 K/uL  Auto Basophil # : 0.03 K/uL  Auto Neutrophil % : 64.2 %  Auto Lymphocyte % : 20.0 %  Auto Monocyte % : 12.1 %  Auto Eosinophil % : 2.9 %  Auto Basophil % : 0.5 %    PT/INR - ( 17 Aug 2023 03:08 )   PT: 11.8 sec;   INR: 1.04          PTT - ( 17 Aug 2023 03:08 )  PTT:30.5 sec    08-17    141  |  104  |  14  ----------------------------<  104<H>  4.0   |  26  |  1.31<H>    Ca    9.1      17 Aug 2023 03:08  Mg     1.8     08-17    TPro  7.3  /  Alb  3.9  /  TBili  0.6  /  DBili  x   /  AST  28  /  ALT  14  /  AlkPhos  57  08-17      Urinalysis Basic - ( 17 Aug 2023 03:08 )    Color: x / Appearance: x / SG: x / pH: x  Gluc: 104 mg/dL / Ketone: x  / Bili: x / Urobili: x   Blood: x / Protein: x / Nitrite: x   Leuk Esterase: x / RBC: x / WBC x   Sq Epi: x / Non Sq Epi: x / Bacteria: x         Hematology Oncology Consult Note     89 year male with PMH of HTN, intracranial aneurysm, esophageal mass with lymphadenopathy who presents with shortness of breath.     Patient was recently seen by Dr. Priest in OP setting 8/15 after recent imaging findings of esophageal mass on CT scan 8/8. During hospital admission, patient presented to [facility] on 8/8 for 3 months of hoarseness and difficulty with swallowing. He initially had seen a pulmonologist who prescribed him an inhaler, though continueed to have episodes of aspiration. 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 Patient was evaluated by Speech and required MBSS for further recommendation.    In the ED this admission, patient's lab significant for WBC 6.2, Hgb 9.1, Trop elevated 256 --> 194, Na 141, K 4.0, Creatinine 1.31, Lactate 1.8, BNP 1780. Found to be aciditotic 7.29 --> 7.08 --> 7.17. Patient was intubated due to worsening mental status. He was treated with antibiotics (now on pip/tazo), solumedrol and duonebs.     Patient currently intubated/sedated, not requiring vasopressors. 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    Oncologic History  -he was initially seen by Dr. Mcguire on 7/7/23 with 3-month history of hoarseness and dysphagia and plan was to have MRI brain, neck, and barium swallow.  -presented to [facility] ED on 8/8/23 with hoarseness and worsening dysphagia x 5 days, he was unable to keep anything po down at that time.  -CT chest on 8/8/23 showed mass in the upper thoracic esophagus suspicious for neoplasm, 3 x 2.5 cm left supraclavicular lymphadenopathy. Left paratracheal lymph node measuring 1 cm in short axis. 2.5 x 2.2 cm retrocrural lymphadenopathy. Mildly enlarged bilateral hilar lymph nodes, nonspecific right adrenal nodule. Questionable 1.4 cm hypodense lesion in the upper pole of the right kidney. Consider follow-up MRI.  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), lives with his wife in [place]     PAST MEDICAL & SURGICAL HISTORY:  HTN (hypertension)  History of intracranial aneurysm  Esophageal mass    Allergies:  No Known Allergies  Medications:  heparin   Injectable 5000 Unit(s) SubCutaneous every 8 hours    Social History:    PHYSICAL EXAM:    T(F): 97.7 (08-17-23 @ 14:22), Max: 98.3 (08-17-23 @ 02:45)  HR: 80 (08-17-23 @ 15:20) (58 - 101)  BP: 113/55 (08-17-23 @ 14:00) (85/50 - 191/98)  RR: 20 (08-17-23 @ 14:00) (17 - 28)  SpO2: 100% (08-17-23 @ 15:20) (90% - 100%)  Wt(kg): --    Daily Height in cm: 167.64 (17 Aug 2023 02:45)    Daily     Gen: intubated/sedated  Neck: supple, no masses, no JVD  Breasts: deferred  Back: nontender  Cardiovascular: RR, nl S1S2, no murmurs/rubs/gallops  Respiratory: clear air entry b/l  Gastrointestinal: BS+, soft, NT/ND, no masses, no splenomegaly, no hepatomegaly, no evidence for ascites  Extremities: no clubbing/cyanosis, no edema, no calf tenderness  Vascular:  DP/PT 2+ b/l  Neurological: CN 2-12 grossly intact, no focal deficits  Skin: no rash on visible skin  Lymph Nodes:  no lymphadenopathy          Labs:                          9.1    6.21  )-----------( 188      ( 17 Aug 2023 03:08 )             29.6     CBC Full  -  ( 17 Aug 2023 03:08 )  WBC Count : 6.21 K/uL  RBC Count : 3.04 M/uL  Hemoglobin : 9.1 g/dL  Hematocrit : 29.6 %  Platelet Count - Automated : 188 K/uL  Mean Cell Volume : 97.4 fl  Mean Cell Hemoglobin : 29.9 pg  Mean Cell Hemoglobin Concentration : 30.7 gm/dL  Auto Neutrophil # : 3.99 K/uL  Auto Lymphocyte # : 1.24 K/uL  Auto Monocyte # : 0.75 K/uL  Auto Eosinophil # : 0.18 K/uL  Auto Basophil # : 0.03 K/uL  Auto Neutrophil % : 64.2 %  Auto Lymphocyte % : 20.0 %  Auto Monocyte % : 12.1 %  Auto Eosinophil % : 2.9 %  Auto Basophil % : 0.5 %    PT/INR - ( 17 Aug 2023 03:08 )   PT: 11.8 sec;   INR: 1.04          PTT - ( 17 Aug 2023 03:08 )  PTT:30.5 sec    08-17    141  |  104  |  14  ----------------------------<  104<H>  4.0   |  26  |  1.31<H>    Ca    9.1      17 Aug 2023 03:08  Mg     1.8     08-17    TPro  7.3  /  Alb  3.9  /  TBili  0.6  /  DBili  x   /  AST  28  /  ALT  14  /  AlkPhos  57  08-17      Urinalysis Basic - ( 17 Aug 2023 03:08 )    Color: x / Appearance: x / SG: x / pH: x  Gluc: 104 mg/dL / Ketone: x  / Bili: x / Urobili: x   Blood: x / Protein: x / Nitrite: x   Leuk Esterase: x / RBC: x / WBC x   Sq Epi: x / Non Sq Epi: x / Bacteria: x

## 2023-08-17 NOTE — ED PROVIDER NOTE - CLINICAL SUMMARY MEDICAL DECISION MAKING FREE TEXT BOX
SOB, diffuse wheezing on exam, stridor, no drooling, increased work of breathing. recently diagnosed with esophageal mass. no hx of asthma, however prior smoker  -check labs  -ekg  -duonebs, solumedrol, mag  -CT chest r/o PE vs PNA

## 2023-08-18 NOTE — PROGRESS NOTE ADULT - PROBLEM SELECTOR PLAN 5
20 minutes was spent discussing advance care planning with the patient family face to face and over the phone.

## 2023-08-18 NOTE — PROGRESS NOTE ADULT - SUBJECTIVE AND OBJECTIVE BOX
INTERVAL HPI/OVERNIGHT EVENTS:  COVID neg. gave 500 LR bolus x2 for decreased UOP. gave 1L LR bolus for decreased UOP. worsening kidney function 2/2 contrast induced nephropathy?. switched to precedex.     SUBJECTIVE:   Patient seen and evaluated at bedside s/p extubation in the AM. Patient developed inspiratory stridor, denies shortness of breath and dyspnea.     ROS:   All negative except as listed above.    VITAL SIGNS:  ICU Vital Signs Last 24 Hrs  T(C): 36.8 (18 Aug 2023 05:06), Max: 37.1 (17 Aug 2023 22:22)  T(F): 98.2 (18 Aug 2023 05:06), Max: 98.8 (17 Aug 2023 22:22)  HR: 91 (18 Aug 2023 09:34) (58 - 91)  BP: 153/67 (18 Aug 2023 09:00) (85/50 - 153/67)  BP(mean): 97 (18 Aug 2023 09:00) (63 - 97)  RR: 20 (18 Aug 2023 09:00) (14 - 28)  SpO2: 100% (18 Aug 2023 09:34) (96% - 100%)    O2 Parameters below as of 18 Aug 2023 09:34  Patient On (Oxygen Delivery Method): nasal cannula        INS & OUTS:   @ :  -   @ 07:00  --------------------------------------------------------  IN: 1540.8 mL / OUT: 969 mL / NET: 571.8 mL     @ :  -   @ 09:51  --------------------------------------------------------  IN: 50 mL / OUT: 280 mL / NET: -230 mL        PHYSICAL EXAM:  General: NAD   Head: MMM; PERRL  Neck: Supple; no JVP elevation  Respiratory: inspiratory stridor, no wheezes/rales/rhonchi  Cardiovascular: RRR; normal S1/S2, no murmurs, rubs gallops   Gastrointestinal: Soft; NTND; normoactive bowel sounds  Extremities: warm, well perfused, no edema/cyanosis  Neurological: AAOx3  Skin: Clean and intact. Good skin turgor. Without open wounds and sores      MEDICATIONS:  MEDICATIONS  (STANDING):  albuterol/ipratropium for Nebulization 3 milliLiter(s) Nebulizer every 6 hours  cefTRIAXone   IVPB 2000 milliGRAM(s) IV Intermittent every 24 hours  chlorhexidine 2% Cloths 1 Application(s) Topical <User Schedule>  heparin   Injectable 5000 Unit(s) SubCutaneous every 8 hours  methylPREDNISolone sodium succinate Injectable 40 milliGRAM(s) IV Push every 24 hours    MEDICATIONS  (PRN):      ALLERGIES:  Allergies    No Known Allergies    Intolerances        LABS:                        7.2    10.39 )-----------( 164      ( 18 Aug 2023 03:30 )             22.7     08-18    138  |  101  |  23  ----------------------------<  231<H>  3.5   |  24  |  1.81<H>    Ca    8.1<L>      18 Aug 2023 03:30  Phos  3.0     08-18  Mg     1.8     -    TPro  6.1  /  Alb  3.1<L>  /  TBili  0.7  /  DBili  x   /  AST  16  /  ALT  10  /  AlkPhos  46  08-18    PT/INR - ( 18 Aug 2023 03:30 )   PT: 12.7 sec;   INR: 1.12          PTT - ( 18 Aug 2023 03:30 )  PTT:24.9 sec  Urinalysis Basic - ( 18 Aug 2023 03:33 )    Color: Yellow / Appearance: Clear / S.020 / pH: x  Gluc: x / Ketone: 15 mg/dL  / Bili: Negative / Urobili: 0.2 E.U./dL   Blood: x / Protein: NEGATIVE mg/dL / Nitrite: NEGATIVE   Leuk Esterase: NEGATIVE / RBC: x / WBC x   Sq Epi: x / Non Sq Epi: x / Bacteria: x      ABG:  pH, Arterial: 7.46 (23 @ 04:36)  pCO2, Arterial: 34 mmHg (23 @ 04:36)  pO2, Arterial: 154 mmHg (23 @ 04:36)  pH, Arterial: 7.42 (23 @ 13:33)  pCO2, Arterial: 34 mmHg (23 @ 13:33)  pO2, Arterial: 143 mmHg (23 @ 13:33)      Micro:    Culture - Blood (collected 23 @ 05:55)  Source: .Blood Blood-Venous  Preliminary Report (23 @ 07:00):    No growth at 1 day.    Culture - Blood (collected 23 @ 05:55)  Source: .Blood Blood-Venous  Preliminary Report (23 @ 07:00):    No growth at 1 day.          RADIOLOGY & ADDITIONAL TESTS: Reviewed. INTERVAL HPI/OVERNIGHT EVENTS:  COVID neg. gave 500 LR bolus x2 for decreased UOP. gave 1L LR bolus for decreased UOP. worsening kidney function 2/2 contrast induced nephropathy. switched to precedex.     SUBJECTIVE:   Patient seen and evaluated at bedside s/p extubation in the AM. Patient developed inspiratory stridor, denies shortness of breath and dyspnea.     ROS:   All negative except as listed above.    VITAL SIGNS:  ICU Vital Signs Last 24 Hrs  T(C): 36.8 (18 Aug 2023 05:06), Max: 37.1 (17 Aug 2023 22:22)  T(F): 98.2 (18 Aug 2023 05:06), Max: 98.8 (17 Aug 2023 22:22)  HR: 91 (18 Aug 2023 09:34) (58 - 91)  BP: 153/67 (18 Aug 2023 09:00) (85/50 - 153/67)  BP(mean): 97 (18 Aug 2023 09:00) (63 - 97)  RR: 20 (18 Aug 2023 09:00) (14 - 28)  SpO2: 100% (18 Aug 2023 09:34) (96% - 100%)    O2 Parameters below as of 18 Aug 2023 09:34  Patient On (Oxygen Delivery Method): nasal cannula        INS & OUTS:   @ :  -   @ 07:00  --------------------------------------------------------  IN: 1540.8 mL / OUT: 969 mL / NET: 571.8 mL     @ :  -   @ 09:51  --------------------------------------------------------  IN: 50 mL / OUT: 280 mL / NET: -230 mL        PHYSICAL EXAM:  General: NAD   Head: MMM; PERRL  Neck: Supple; no JVP elevation  Respiratory: inspiratory stridor, no wheezes/rales/rhonchi  Cardiovascular: RRR; normal S1/S2, no murmurs, rubs gallops   Gastrointestinal: Soft; NTND; normoactive bowel sounds  Extremities: warm, well perfused, no edema/cyanosis  Neurological: AAOx3  Skin: Clean and intact. Good skin turgor. Without open wounds and sores      MEDICATIONS:  MEDICATIONS  (STANDING):  albuterol/ipratropium for Nebulization 3 milliLiter(s) Nebulizer every 6 hours  cefTRIAXone   IVPB 2000 milliGRAM(s) IV Intermittent every 24 hours  chlorhexidine 2% Cloths 1 Application(s) Topical <User Schedule>  heparin   Injectable 5000 Unit(s) SubCutaneous every 8 hours  methylPREDNISolone sodium succinate Injectable 40 milliGRAM(s) IV Push every 24 hours    MEDICATIONS  (PRN):      ALLERGIES:  Allergies    No Known Allergies    Intolerances        LABS:                        7.2    10.39 )-----------( 164      ( 18 Aug 2023 03:30 )             22.7     08-18    138  |  101  |  23  ----------------------------<  231<H>  3.5   |  24  |  1.81<H>    Ca    8.1<L>      18 Aug 2023 03:30  Phos  3.0     08-18  Mg     1.8     -    TPro  6.1  /  Alb  3.1<L>  /  TBili  0.7  /  DBili  x   /  AST  16  /  ALT  10  /  AlkPhos  46  08-18    PT/INR - ( 18 Aug 2023 03:30 )   PT: 12.7 sec;   INR: 1.12          PTT - ( 18 Aug 2023 03:30 )  PTT:24.9 sec  Urinalysis Basic - ( 18 Aug 2023 03:33 )    Color: Yellow / Appearance: Clear / S.020 / pH: x  Gluc: x / Ketone: 15 mg/dL  / Bili: Negative / Urobili: 0.2 E.U./dL   Blood: x / Protein: NEGATIVE mg/dL / Nitrite: NEGATIVE   Leuk Esterase: NEGATIVE / RBC: x / WBC x   Sq Epi: x / Non Sq Epi: x / Bacteria: x      ABG:  pH, Arterial: 7.46 (23 @ 04:36)  pCO2, Arterial: 34 mmHg (23 @ 04:36)  pO2, Arterial: 154 mmHg (23 @ 04:36)  pH, Arterial: 7.42 (23 @ 13:33)  pCO2, Arterial: 34 mmHg (23 @ 13:33)  pO2, Arterial: 143 mmHg (23 @ 13:33)      Micro:    Culture - Blood (collected 23 @ 05:55)  Source: .Blood Blood-Venous  Preliminary Report (23 @ 07:00):    No growth at 1 day.    Culture - Blood (collected 23 @ 05:55)  Source: .Blood Blood-Venous  Preliminary Report (23 @ 07:00):    No growth at 1 day.          RADIOLOGY & ADDITIONAL TESTS: Reviewed.

## 2023-08-18 NOTE — PROGRESS NOTE ADULT - PROBLEM SELECTOR PLAN 6
Patient made a DNR/DNI yesterday and after today's conversation, it appears that goal is to concentrate on comfort once wife sees him. Please see [facility] document above.   - Christian/Spiritual practice: Shinto   - Coping: [ ] well [ ] with difficulty [ ] poor coping [x] unable to obtain   - Support system: [ x] strong [ ] adequate [ ] inadequate  - All questions answered, emotional support provided  -  primary team   - Please contact Palliative Medicine 24/7 at [phone] for any acute symptoms or further questions  - Will continue to follow with you

## 2023-08-18 NOTE — PROGRESS NOTE ADULT - ASSESSMENT
89 year male with PMH of HTN, intracranial aneurysm, esophageal mass with lymphadenopathy who presents with shortness of breath. Hematology/Oncology consulted for esophageal mass.    #Esophageal mass  - noted on CT scan 8/8/23 with supraclavicular, left paratracheal (with invasion into thyroid gland), retrocrural, and hilar lymphadenopathy. Nonspecific right adrenal nodule and 1.4 cm hypodense lesion in right kidney also noted  - seen by Dr. Priest 8/15/23 and had planned for IR biopsy 8/18/23   - concerning for malignancy, differential includes esophageal neoplasm vs lymphoma vs thyroid malignancy, though ultimately would need biopsy to determine treatment pathway.   - if/when hemodynamically stable, would obtain core biopsy as treatment options would differ depending on diagnosis and CT A/P to complete staging as clinical status improves   - given ongoing aspiration events, may also need to consider alternative means of nutrition, and so agree with palliative care consultation to assist with goals of care needs  - of note, per discussion with granddaughter, patient does want full treatment at this time and would like to know treatment options for her malignancy when they are available    To be discussed with Dr. Jacob  Discussed with Dr. Jacob. Recommendations are considered final after attending attestation   89 year male with PMH of HTN, intracranial aneurysm, esophageal mass with lymphadenopathy who presents with shortness of breath. Hematology/Oncology consulted for esophageal mass.    #Esophageal mass  - noted on CT scan 8/8/23 with supraclavicular, left paratracheal (with invasion into thyroid gland), retrocrural, and hilar lymphadenopathy. Nonspecific right adrenal nodule and 1.4 cm hypodense lesion in right kidney also noted  - seen by Dr. Priest 8/15/23 and had planned for IR biopsy 8/18/23   - concerning for malignancy, differential includes esophageal neoplasm vs lymphoma vs thyroid malignancy, though ultimately would need biopsy to determine treatment pathway.   - if/when hemodynamically stable, would obtain core biopsy as treatment options would differ depending on diagnosis and CT A/P to complete staging as clinical status improves   - given ongoing aspiration events, may also need to consider alternative means of nutrition, and so agree with palliative care consultation to assist with goals of care needs  - of note, per discussion with granddaughter, patient does want full treatment at this time and would like to know treatment options for her malignancy when they are available    To be discussed with Dr. Jacob   89 year male with PMH of HTN, intracranial aneurysm, esophageal mass with lymphadenopathy who presents with shortness of breath. Hematology/Oncology consulted for esophageal mass.    #Esophageal mass  - noted on CT scan 8/8/23 with supraclavicular, left paratracheal (with invasion into thyroid gland), retrocrural, and hilar lymphadenopathy. Nonspecific right adrenal nodule and 1.4 cm hypodense lesion in right kidney also noted  - seen by Dr. Priest 8/15/23 and had planned for IR biopsy 8/18/23   - concerning for malignancy, differential includes esophageal neoplasm vs lymphoma vs thyroid malignancy, though ultimately would need biopsy to determine treatment pathway.   - if/when hemodynamically stable, would obtain core biopsy as treatment options would differ depending on diagnosis and CT A/P to complete staging as clinical status improves   - given ongoing aspiration events, may also need to consider alternative means of nutrition, and so agree with palliative care consultation to assist with goals of care needs  - of note, per discussion with granddaughter, patient does want full treatment of malignancy at this time and would like to know treatment options for her malignancy when they are available    To be discussed with Dr. Jacob

## 2023-08-18 NOTE — PROGRESS NOTE ADULT - CRITICAL CARE ATTENDING COMMENT
Acute hypoxemic, hypercarbic respiratory failure requiring mechanical ventilation. 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 C/b aspiration pneumonia. Extubated this AM with residual inspiratory wheezing which may be due to progressive vocal cord compression versus VCD versus post extubation edema. ENT evaluation pending. Will start with Solumedrol 40mg TID. As per family wishes, DNR/DNI. Rest of plan as per above.

## 2023-08-18 NOTE — PROGRESS NOTE ADULT - PROBLEM SELECTOR PLAN 4
Patient is known to Dr. Priest and was planning to get a biopsy in the community as this is what the patient wanted. Given patients cancer is compressing airway, family leaning towards comfort after wife see the patient.

## 2023-08-18 NOTE — PROGRESS NOTE ADULT - SUBJECTIVE AND OBJECTIVE BOX
*** NOTE IN PROGRESS ***    Hematology Oncology Progress Note    Interval History:    SUBJECTIVE:   Overnight events   Extubated this morning prior to biopsy, though with stridor requiring ENT evaluation  Evaluated patient at bedside, currently on BIPAP and lethargic after receiving ativan.   Granddaughter at bedside - I explained at this time, we still do not know what type of malignancy this represents and thus we don't know what treatment options we can offer.   Patient made DNR/DNI by primary team yesterday    OBJECTIVE:    VITAL SIGNS:  ICU Vital Signs Last 24 Hrs  T(C): 37 (18 Aug 2023 11:20), Max: 37.1 (17 Aug 2023 22:22)  T(F): 98.6 (18 Aug 2023 11:20), Max: 98.8 (17 Aug 2023 22:22)  HR: 91 (18 Aug 2023 13:07) (62 - 102)  BP: 166/73 (18 Aug 2023 12:00) (95/54 - 172/75)  BP(mean): 105 (18 Aug 2023 12:00) (71 - 109)  ABP: --  ABP(mean): --  RR: 18 (18 Aug 2023 12:30) (14 - 22)  SpO2: 100% (18 Aug 2023 13:07) (93% - 100%)    O2 Parameters below as of 18 Aug 2023 12:30  Patient On (Oxygen Delivery Method): BiPAP/CPAP    O2 Concentration (%): 100      Mode: AC/ CMV (Assist Control/ Continuous Mandatory Ventilation), RR (machine): 20, TV (machine): 450, FiO2: 37, PEEP: 5, ITime: 1, MAP: 10, PIP: 21     @ 07:  -   @ 07:00  --------------------------------------------------------  IN: 1540.8 mL / OUT: 969 mL / NET: 571.8 mL     @ 07:01  -   @ 13:56  --------------------------------------------------------  IN: 50 mL / OUT: 555 mL / NET: -505 mL      CAPILLARY BLOOD GLUCOSE          PHYSICAL EXAM:  General: NAD, answering questions, pleasantly conversant  HEENT: NC/AT; PERRL, clear conjunctiva  Neck: supple  Respiratory: CTA b/l  Cardiovascular: +S1/S2; RRR  Abdomen: soft, NT/ND; +BS x4  Extremities: WWP, 2+ peripheral pulses b/l; no LE edema  Skin: normal color and turgor; no rash  Neurological:     MEDICATIONS:  MEDICATIONS  (STANDING):  acetaminophen   IVPB .. 1000 milliGRAM(s) IV Intermittent once  albuterol/ipratropium for Nebulization 3 milliLiter(s) Nebulizer every 6 hours  cefTRIAXone   IVPB 2000 milliGRAM(s) IV Intermittent every 24 hours  chlorhexidine 2% Cloths 1 Application(s) Topical <User Schedule>  heparin   Injectable 5000 Unit(s) SubCutaneous every 8 hours  methylPREDNISolone sodium succinate Injectable 40 milliGRAM(s) IV Push every 8 hours    MEDICATIONS  (PRN):      ALLERGIES:  Allergies    No Known Allergies    Intolerances        LABS:                        7.2    10.39 )-----------( 164      ( 18 Aug 2023 03:30 )             22.7     08-18    138  |  101  |  23  ----------------------------<  231<H>  3.5   |  24  |  1.81<H>    Ca    8.1<L>      18 Aug 2023 03:30  Phos  3.0     08-18  Mg     1.8     08-18    TPro  6.1  /  Alb  3.1<L>  /  TBili  0.7  /  DBili  x   /  AST  16  /  ALT  10  /  AlkPhos  46  08-18    PT/INR - ( 18 Aug 2023 03:30 )   PT: 12.7 sec;   INR: 1.12          PTT - ( 18 Aug 2023 03:30 )  PTT:24.9 sec  Urinalysis Basic - ( 18 Aug 2023 03:33 )    Color: Yellow / Appearance: Clear / S.020 / pH: x  Gluc: x / Ketone: 15 mg/dL  / Bili: Negative / Urobili: 0.2 E.U./dL   Blood: x / Protein: NEGATIVE mg/dL / Nitrite: NEGATIVE   Leuk Esterase: NEGATIVE / RBC: x / WBC x   Sq Epi: x / Non Sq Epi: x / Bacteria: x            Culture - Blood (collected 23 @ 05:55)  Source: .Blood Blood-Venous  Preliminary Report (23 @ 07:00):    No growth at 1 day.    Culture - Blood (collected 23 @ 05:55)  Source: .Blood Blood-Venous  Preliminary Report (23 @ 07:00):    No growth at 1 day.            RADIOLOGY & ADDITIONAL TESTS: Reviewed.   Hematology Oncology Progress Note    Interval History:    SUBJECTIVE:   Overnight events   Extubated this morning prior to biopsy, though with stridor requiring ENT evaluation  Evaluated patient at bedside, currently on BIPAP and lethargic after receiving ativan.   Granddaughter at bedside - I explained at this time, we still do not know what type of malignancy this represents and thus we don't know what treatment options we can offer.   Patient made DNR/DNI by primary team yesterday    OBJECTIVE:    VITAL SIGNS:  ICU Vital Signs Last 24 Hrs  T(C): 37 (18 Aug 2023 11:20), Max: 37.1 (17 Aug 2023 22:22)  T(F): 98.6 (18 Aug 2023 11:20), Max: 98.8 (17 Aug 2023 22:22)  HR: 91 (18 Aug 2023 13:07) (62 - 102)  BP: 166/73 (18 Aug 2023 12:00) (95/54 - 172/75)  BP(mean): 105 (18 Aug 2023 12:00) (71 - 109)  ABP: --  ABP(mean): --  RR: 18 (18 Aug 2023 12:30) (14 - 22)  SpO2: 100% (18 Aug 2023 13:07) (93% - 100%)    O2 Parameters below as of 18 Aug 2023 12:30  Patient On (Oxygen Delivery Method): BiPAP/CPAP    O2 Concentration (%): 100      Mode: AC/ CMV (Assist Control/ Continuous Mandatory Ventilation), RR (machine): 20, TV (machine): 450, FiO2: 37, PEEP: 5, ITime: 1, MAP: 10, PIP: 21     @ 07: @ 07:00  --------------------------------------------------------  IN: 1540.8 mL / OUT: 969 mL / NET: 571.8 mL     @ 07:  -   @ 13:56  --------------------------------------------------------  IN: 50 mL / OUT: 555 mL / NET: -505 mL      CAPILLARY BLOOD GLUCOSE          PHYSICAL EXAM:  General: NAD, answering questions, pleasantly conversant  HEENT: NC/AT; PERRL, clear conjunctiva  Neck: supple  Respiratory: CTA b/l  Cardiovascular: +S1/S2; RRR  Abdomen: soft, NT/ND; +BS x4  Extremities: WWP, 2+ peripheral pulses b/l; no LE edema  Skin: normal color and turgor; no rash  Neurological:     MEDICATIONS:  MEDICATIONS  (STANDING):  acetaminophen   IVPB .. 1000 milliGRAM(s) IV Intermittent once  albuterol/ipratropium for Nebulization 3 milliLiter(s) Nebulizer every 6 hours  cefTRIAXone   IVPB 2000 milliGRAM(s) IV Intermittent every 24 hours  chlorhexidine 2% Cloths 1 Application(s) Topical <User Schedule>  heparin   Injectable 5000 Unit(s) SubCutaneous every 8 hours  methylPREDNISolone sodium succinate Injectable 40 milliGRAM(s) IV Push every 8 hours    MEDICATIONS  (PRN):      ALLERGIES:  Allergies    No Known Allergies    Intolerances        LABS:                        7.2    10.39 )-----------( 164      ( 18 Aug 2023 03:30 )             22.7     08-18    138  |  101  |  23  ----------------------------<  231<H>  3.5   |  24  |  1.81<H>    Ca    8.1<L>      18 Aug 2023 03:30  Phos  3.0     08-18  Mg     1.8     08-18    TPro  6.1  /  Alb  3.1<L>  /  TBili  0.7  /  DBili  x   /  AST  16  /  ALT  10  /  AlkPhos  46  08-18    PT/INR - ( 18 Aug 2023 03:30 )   PT: 12.7 sec;   INR: 1.12          PTT - ( 18 Aug 2023 03:30 )  PTT:24.9 sec  Urinalysis Basic - ( 18 Aug 2023 03:33 )    Color: Yellow / Appearance: Clear / S.020 / pH: x  Gluc: x / Ketone: 15 mg/dL  / Bili: Negative / Urobili: 0.2 E.U./dL   Blood: x / Protein: NEGATIVE mg/dL / Nitrite: NEGATIVE   Leuk Esterase: NEGATIVE / RBC: x / WBC x   Sq Epi: x / Non Sq Epi: x / Bacteria: x            Culture - Blood (collected 23 @ 05:55)  Source: .Blood Blood-Venous  Preliminary Report (23 @ 07:00):    No growth at 1 day.    Culture - Blood (collected 23 @ 05:55)  Source: .Blood Blood-Venous  Preliminary Report (23 @ 07:00):    No growth at 1 day.            RADIOLOGY & ADDITIONAL TESTS: Reviewed.   Hematology Oncology Progress Note    Interval History:    SUBJECTIVE:   Overnight events   Extubated this morning prior to biopsy, though with stridor requiring ENT evaluation  Evaluated patient at bedside, currently on BIPAP and lethargic after receiving ativan.   Granddaughter at bedside - I explained at this time, we still do not know what type of malignancy this represents and thus we don't know what treatment options we can offer.   Patient made DNR/DNI by primary team yesterday    OBJECTIVE:    VITAL SIGNS:  ICU Vital Signs Last 24 Hrs  T(C): 37 (18 Aug 2023 11:20), Max: 37.1 (17 Aug 2023 22:22)  T(F): 98.6 (18 Aug 2023 11:20), Max: 98.8 (17 Aug 2023 22:22)  HR: 91 (18 Aug 2023 13:07) (62 - 102)  BP: 166/73 (18 Aug 2023 12:00) (95/54 - 172/75)  BP(mean): 105 (18 Aug 2023 12:00) (71 - 109)  ABP: --  ABP(mean): --  RR: 18 (18 Aug 2023 12:30) (14 - 22)  SpO2: 100% (18 Aug 2023 13:07) (93% - 100%)    O2 Parameters below as of 18 Aug 2023 12:30  Patient On (Oxygen Delivery Method): BiPAP/CPAP    O2 Concentration (%): 100      Mode: AC/ CMV (Assist Control/ Continuous Mandatory Ventilation), RR (machine): 20, TV (machine): 450, FiO2: 37, PEEP: 5, ITime: 1, MAP: 10, PIP: 21     @ 07: @ 07:00  --------------------------------------------------------  IN: 1540.8 mL / OUT: 969 mL / NET: 571.8 mL     @ 07:  -   @ 13:56  --------------------------------------------------------  IN: 50 mL / OUT: 555 mL / NET: -505 mL      CAPILLARY BLOOD GLUCOSE          PHYSICAL EXAM:  General: lethargic, in respiratory distress, currently on BIPAP   Neck: supple  Respiratory: CTA b/l  Cardiovascular: +S1/S2; RRR  Abdomen: soft, NT/ND; +BS x4  Extremities: warm, well perfused, no edema/cyanosis  Skin: Clean and intact.   Neurological: AAOX3    MEDICATIONS:  MEDICATIONS  (STANDING):  acetaminophen   IVPB .. 1000 milliGRAM(s) IV Intermittent once  albuterol/ipratropium for Nebulization 3 milliLiter(s) Nebulizer every 6 hours  cefTRIAXone   IVPB 2000 milliGRAM(s) IV Intermittent every 24 hours  chlorhexidine 2% Cloths 1 Application(s) Topical <User Schedule>  heparin   Injectable 5000 Unit(s) SubCutaneous every 8 hours  methylPREDNISolone sodium succinate Injectable 40 milliGRAM(s) IV Push every 8 hours    MEDICATIONS  (PRN):      ALLERGIES:  Allergies    No Known Allergies    Intolerances        LABS:                        7.2    10.39 )-----------( 164      ( 18 Aug 2023 03:30 )             22.7     08-18    138  |  101  |  23  ----------------------------<  231<H>  3.5   |  24  |  1.81<H>    Ca    8.1<L>      18 Aug 2023 03:30  Phos  3.0     08-18  Mg     1.8     08-18    TPro  6.1  /  Alb  3.1<L>  /  TBili  0.7  /  DBili  x   /  AST  16  /  ALT  10  /  AlkPhos  46  08-18    PT/INR - ( 18 Aug 2023 03:30 )   PT: 12.7 sec;   INR: 1.12          PTT - ( 18 Aug 2023 03:30 )  PTT:24.9 sec  Urinalysis Basic - ( 18 Aug 2023 03:33 )    Color: Yellow / Appearance: Clear / S.020 / pH: x  Gluc: x / Ketone: 15 mg/dL  / Bili: Negative / Urobili: 0.2 E.U./dL   Blood: x / Protein: NEGATIVE mg/dL / Nitrite: NEGATIVE   Leuk Esterase: NEGATIVE / RBC: x / WBC x   Sq Epi: x / Non Sq Epi: x / Bacteria: x            Culture - Blood (collected 23 @ 05:55)  Source: .Blood Blood-Venous  Preliminary Report (23 @ 07:00):    No growth at 1 day.    Culture - Blood (collected 23 @ 05:55)  Source: .Blood Blood-Venous  Preliminary Report (23 @ 07:00):    No growth at 1 day.            RADIOLOGY & ADDITIONAL TESTS: Reviewed.

## 2023-08-18 NOTE — CHART NOTE - NSCHARTNOTEFT_GEN_A_CORE
Spoke to grand-daughter Nanette at bedside. Patient was evaluate by Primary Team and ENT, which revealed limited airway clearance, due to known R-sided vocal cord paralysis and overall edema from recent intubation. Decision was made to trial comfort BiPAP for positive pressure ventilation, however if patient were to further decompensate, patient remains DNR/DNI.

## 2023-08-18 NOTE — PROGRESS NOTE ADULT - ASSESSMENT
Assessment and Plan:  ASSESSMENT  89 yo M PMH recently diagnosed esophageal cancer, aspirations and vocal cord paralysis presents to the ED for 1 day of worsening dyspnea found to be hypoxic to 90% w/ AMS and worsening CO2 retention, subsequently intubated and admitted to the MICU for further management. Patient has inspiratory stridor s/p extubation today, given pushes of Ativan.     NEURO  #Extubated s/p intubation 2/2 AHRF w/ hypercapnia  Patient mentating well s/p extubation.  - d/c Precedex   - Ativan 1.0mg IVP PRN       PULMONARY   #Acute hypoxic and hypercapnic respiratory failure  Possibly 2/2 aspiration PNA vs esophogeal mass effect on trachea.  CT pointing towards most likely bilateral aspiration PNA, but cannot r/o increased mass effect compromising airway.  - s/p extubation, now satting well on 2LNC    - d/c vanc and Zosyn  - start ceftriaxone 2g IV q24       #Inspiratory stridor s/p extubation   - ENT consult for laryngoscopy regarding vocal cord paralysis   - Ativan 1.0 mg IVP PRN       #Potential History of COPD/Asthma  Called pharmacy for med rec, on albuterol and symbicort at home.  - can start q6 duonebs   - CTM respiratory status      CARDIOVASCULAR  #HTN  On atenolol at home. BPs stable.  - hold home atenolol for now       GI  #Esophogeal Obstruction  Mass effect contributing to aspiration risk.   - Can consider PEG given multiple aspirations, awaiting GOC discussion and oncology recs       ENDOCRINE  - GITA      RENAL  #BRETT  Overnight patient had low UOP, given total of 2L LR boluses. BUN increased to 23 from 14, creatinine uptrending to 1.81 from 1.31. Can't rule out transient intrinsic insult s/p CTA with contrast.   - CTM BUN/Cr   - avoid nephrotoxic meds if able      INFECTIOUS DISEASE  #B/l pneumonia  Likely aspiration given history of esophogeal mass  - d/c vanc and zosyn  - ceftriaxone 2g IV q24    - blood cxs NG @ 1 day  - f/u sputum cx       HEMATOLOGY/ONCOLOGY  # Esophageal cancer   New diagnosis, oncology f/u outpt had described a differential of (primary esophogeal, thyroid, lymphoma) and was scheduled for a L supraclavicular node biopsy on 8/18. Oncology consulted, verbally let us know that he has never had the mass or enlarged lymph nodes biopsied. A biopsy would be needed to determine route of care (if solid cancer would lean radiation, if lymphoma would lean medical/chemo management). Cannot r/o other primary cancer etiology and subsequent mets.                                               - palliative consult   - f/u IR regarding LN biopsy   - ongoing GOC discussions with family        PPX  F: none  E: replete PRN  N: NPO  DVT PPx: heparin subq  Dispo: DNR/Do not reintubate 89 yo M PMH recently diagnosed esophageal cancer, aspirations and vocal cord paralysis presents to the ED for 1 day of worsening dyspnea found to be hypoxic to 90% w/ AMS and worsening CO2 retention, subsequently intubated and admitted to the MICU for further management. Patient has inspiratory stridor s/p extubation today, given pushes of Ativan. ENT paged for laryngoscopy. Palliative care, heme/onc, and IR involved regarding lymph node biopsy for diagnosis.        NEURO  #Extubated s/p intubation 2/2 AHRF w/ hypercapnia  Patient mentating well s/p extubation.  - d/c Precedex   - Ativan 1.0mg IVP PRN       PULMONARY   #Acute hypoxic and hypercapnic respiratory failure  Possibly 2/2 aspiration PNA vs esophogeal mass effect on trachea.  CT pointing towards most likely bilateral aspiration PNA, but cannot r/o increased mass effect compromising airway.  - s/p extubation, now satting well on 2LNC    - d/c vanc and Zosyn  - start ceftriaxone 2g IV q24       #Inspiratory stridor s/p extubation   - ENT consult for laryngoscopy regarding vocal cord paralysis   - Ativan 1.0 mg IVP PRN   - solumedrol 40 mg TID       #Potential History of COPD/Asthma  Called pharmacy for med rec, on albuterol and symbicort at home.  - can start q6 duonebs   - CTM respiratory status      CARDIOVASCULAR  #HTN  On atenolol at home. BPs stable.  - hold home atenolol for now       GI  #Esophogeal Obstruction  Mass effect contributing to aspiration risk.   - Can consider PEG given multiple aspirations, awaiting GOC discussion and oncology recs       ENDOCRINE  - GITA      RENAL  #BRETT  Overnight patient had low UOP, given total of 2L LR boluses. BUN increased to 23 from 14, creatinine uptrending to 1.81 from 1.31. Can't rule out transient intrinsic insult s/p CTA with contrast.   - CTM BUN/Cr   - avoid nephrotoxic meds if able      INFECTIOUS DISEASE  #B/l pneumonia  Likely aspiration given history of esophogeal mass  - d/c vanc and zosyn  - ceftriaxone 2g IV q24    - blood cxs NG @ 1 day  - f/u sputum cx       HEMATOLOGY/ONCOLOGY  # Esophageal cancer   New diagnosis, oncology f/u outpt had described a differential of (primary esophogeal, thyroid, lymphoma) and was scheduled for a L supraclavicular node biopsy on 8/18. Oncology consulted, verbally let us know that he has never had the mass or enlarged lymph nodes biopsied. A biopsy would be needed to determine route of care (if solid cancer would lean radiation, if lymphoma would lean medical/chemo management). Family amenable to lymph node fine needle aspiration for diagnosis.   - palliative and heme/onc on-board, appreciate recs   - f/u IR regarding LN biopsy   - ongoing GOC discussions with family        PPX  F: none  E: replete PRN  N: NPO  DVT PPx: heparin subq, SCDs   Dispo: DNR/Do not reintubate 89 yo M PMH recently diagnosed esophageal cancer, aspirations and vocal cord paralysis presents to the ED for 1 day of worsening dyspnea found to be hypoxic to 90% w/ AMS and worsening CO2 retention, subsequently intubated and admitted to the MICU for further management. Patient has inspiratory stridor s/p extubation today, given pushes of Ativan. ENT paged for laryngoscopy. Palliative care, heme/onc, and IR involved regarding lymph node biopsy for diagnosis.        NEURO  #Extubated s/p intubation 2/2 AHRF w/ hypercapnia  Patient mentating well s/p extubation.  - d/c Precedex   - Ativan 1.0mg IVP PRN       PULMONARY   #Acute hypoxic and hypercapnic respiratory failure  Possibly 2/2 aspiration PNA vs esophogeal mass effect on trachea.  CT pointing towards most likely bilateral aspiration PNA, but cannot r/o increased mass effect compromising airway.  - s/p extubation, now satting well on 1LNC    - d/c vanc and Zosyn  - start ceftriaxone 2g IV q24         #Inspiratory stridor s/p extubation   - ENT consult for laryngoscopy regarding vocal cord paralysis , c/f for worsening R sided paralysis or new L sided paralysis   - Ativan 1.0 mg IVP PRN   - solumedrol 40 mg TID         #Potential History of COPD/Asthma  Called pharmacy for med rec, on albuterol and symbicort at home.  - c q6 duonebs   - CTM respiratory status        CARDIOVASCULAR  #HTN  On atenolol at home. BPs stable.  - hold home atenolol for now         GI  #Esophogeal Obstruction  Mass effect contributing to aspiration risk.   - Can consider PEG given multiple aspirations, awaiting GOC discussion and oncology recs   - NPO for potential IR procedure today        ENDOCRINE  - GITA        RENAL  Overnight patient had low UOP, given total of 2L LR boluses. BUN increased to 23 from 14, creatinine uptrending to 1.81 from 1.31. Can't rule out transient intrinsic insult s/p CTA with contrast. However FeNa 0.1% suggesting pre renal etiology possibly 2/2 to poor PO intake prior to admission.   - CTM BUN/Cr   - avoid nephrotoxic meds if able        INFECTIOUS DISEASE  #B/l pneumonia  Likely aspiration given history of esophogeal mass and Seen on CT Chest  - d/c vanc and zosyn   - start ceftriaxone 2g IV q24    - blood cxs NG @ 1 day  - f/u sputum cx       HEMATOLOGY/ONCOLOGY  # Esophageal cancer   New diagnosis, oncology f/u outpt had described a differential of (primary esophogeal, thyroid, lymphoma) and was scheduled for a L supraclavicular node biopsy on 8/18. Oncology consulted, verbally let us know that he has never had the mass or enlarged lymph nodes biopsied. A biopsy would be needed to determine route of care (if solid cancer would lean radiation, if lymphoma would lean medical/chemo management). Family amenable to lymph node fine needle aspiration for diagnosis, however core biopsy needed per onc and family on board with this.   - palliative and heme/onc on-board, appreciate recs   - core biopsy IR procedure order placed  - ongoing GOC discussions with family        PPX  F: none  E: replete PRN  N: NPO  DVT PPx: heparin subq, SCDs   Dispo: DNR/Do not reintubate 89 yo M PMH recently diagnosed esophageal cancer, aspirations and vocal cord paralysis presents to the ED for 1 day of worsening dyspnea found to be hypoxic to 90% w/ AMS and worsening CO2 retention, subsequently intubated and admitted to the MICU for further management. Patient has inspiratory stridor s/p extubation today, given pushes of Ativan. ENT paged for laryngoscopy. Palliative care, heme/onc, and IR involved regarding lymph node biopsy for diagnosis.        NEURO  #Extubated s/p intubation 2/2 AHRF w/ hypercapnia  Patient mentating well s/p extubation.  - d/c Precedex   - Ativan 1.0mg IVP PRN       PULMONARY   #Acute hypoxic and hypercapnic respiratory failure  Possibly 2/2 aspiration PNA vs esophogeal mass effect on trachea.  CT pointing towards most likely bilateral aspiration PNA, but cannot r/o increased mass effect compromising airway.  - s/p extubation, now satting well on 1LNC    - d/c vanc and Zosyn  - start ceftriaxone 2g IV q24         #Inspiratory stridor s/p extubation   - ENT consult for laryngoscopy regarding vocal cord paralysis , c/f for worsening R sided paralysis or new L sided paralysis   - Ativan 1.0 mg IVP PRN   - solumedrol 40 mg qd        #Potential History of COPD/Asthma  Called pharmacy for med rec, on albuterol and symbicort at home.  - c q6 duonebs   - CTM respiratory status        CARDIOVASCULAR  #HTN  On atenolol at home. BPs stable.  - hold home atenolol for now         GI  #Esophogeal Obstruction  Mass effect contributing to aspiration risk.   - Can consider PEG given multiple aspirations, awaiting GOC discussion and oncology recs   - NPO for potential IR procedure today        ENDOCRINE  - GITA        RENAL  Overnight patient had low UOP, given total of 2L LR boluses. BUN increased to 23 from 14, creatinine uptrending to 1.81 from 1.31. Can't rule out transient intrinsic insult s/p CTA with contrast. However FeNa 0.1% suggesting pre renal etiology possibly 2/2 to poor PO intake prior to admission.   - CTM BUN/Cr   - avoid nephrotoxic meds if able        INFECTIOUS DISEASE  #B/l pneumonia  Likely aspiration given history of esophogeal mass and Seen on CT Chest  - d/c vanc and zosyn   - start ceftriaxone 2g IV q24    - blood cxs NG @ 1 day  - f/u sputum cx       HEMATOLOGY/ONCOLOGY  # Esophageal cancer   New diagnosis, oncology f/u outpt had described a differential of (primary esophogeal, thyroid, lymphoma) and was scheduled for a L supraclavicular node biopsy on 8/18. Oncology consulted, verbally let us know that he has never had the mass or enlarged lymph nodes biopsied. A biopsy would be needed to determine route of care (if solid cancer would lean radiation, if lymphoma would lean medical/chemo management). Family amenable to lymph node fine needle aspiration for diagnosis, however core biopsy needed per onc and family on board with this.   - palliative and heme/onc on-board, appreciate recs   - core biopsy IR procedure order placed  - ongoing GOC discussions with family        PPX  F: none  E: replete PRN  N: NPO  DVT PPx: heparin subq, SCDs   Dispo: DNR/Do not reintubate 89 yo M PMH recently diagnosed esophageal cancer, aspirations and vocal cord paralysis presents to the ED for 1 day of worsening dyspnea found to be hypoxic to 90% w/ AMS and worsening CO2 retention, subsequently intubated and admitted to the MICU for further management. Patient has inspiratory stridor s/p extubation today, given pushes of Ativan. ENT paged for laryngoscopy. Palliative care, heme/onc, and IR involved regarding lymph node biopsy for diagnosis.        NEURO  #Extubated s/p intubation 2/2 AHRF w/ hypercapnia  Patient mentating well s/p extubation.  - d/c Precedex   - Ativan 1.0mg IVP PRN       PULMONARY   #Acute hypoxic and hypercapnic respiratory failure  Possibly 2/2 aspiration PNA vs esophogeal mass effect on trachea.  CT pointing towards most likely bilateral aspiration PNA, but cannot r/o increased mass effect compromising airway.  - s/p extubation, now satting well on 1LNC    - d/c vanc and Zosyn  - start ceftriaxone 2g IV q24         #Inspiratory stridor s/p extubation   - ENT consult for laryngoscopy regarding vocal cord paralysis , c/f for worsening R sided paralysis or new L sided paralysis   - Ativan 1.0 mg IVP PRN   - solumedrol 40 mg qd        #Potential History of COPD/Asthma  Called pharmacy for med rec, on albuterol and symbicort at home.  - c q6 duonebs   - CTM respiratory status        CARDIOVASCULAR  #HTN  On atenolol at home. BPs stable.  - hold home atenolol for now         GI  #Esophogeal Obstruction  Mass effect contributing to aspiration risk.   - Can consider PEG given multiple aspirations, awaiting GOC discussion and oncology recs   - NPO for potential IR procedure today        ENDOCRINE  - GITA        RENAL  Overnight patient had low UOP, given total of 2L LR boluses. BUN increased to 23 from 14, creatinine uptrending to 1.81 from 1.31. Can't rule out transient intrinsic insult s/p CTA with contrast. However FeNa 0.1% suggesting pre renal etiology possibly 2/2 to poor PO intake prior to admission.   - CTM BUN/Cr   - avoid nephrotoxic meds if able  - will pull ching and trial to void        INFECTIOUS DISEASE  #B/l pneumonia  Likely aspiration given history of esophogeal mass and Seen on CT Chest  - d/c vanc and zosyn   - start ceftriaxone 2g IV q24    - blood cxs NG @ 1 day  - f/u sputum cx       HEMATOLOGY/ONCOLOGY  # Esophageal cancer   New diagnosis, oncology f/u outpt had described a differential of (primary esophogeal, thyroid, lymphoma) and was scheduled for a L supraclavicular node biopsy on 8/18. Oncology consulted, verbally let us know that he has never had the mass or enlarged lymph nodes biopsied. A biopsy would be needed to determine route of care (if solid cancer would lean radiation, if lymphoma would lean medical/chemo management). Family amenable to lymph node fine needle aspiration for diagnosis, however core biopsy needed per onc and family on board with this.   - palliative and heme/onc on-board, appreciate recs   - core biopsy IR procedure order placed  - ongoing GOC discussions with family        PPX  F: none  E: replete PRN  N: NPO  DVT PPx: heparin subq, SCDs   Dispo: DNR/Do not reintubate 89 yo M PMH recently diagnosed esophageal cancer, aspirations and vocal cord paralysis presents to the ED for 1 day of worsening dyspnea found to be hypoxic to 90% w/ AMS and worsening CO2 retention, subsequently intubated and admitted to the MICU for further management. Patient has inspiratory stridor s/p extubation today, given pushes of Ativan. ENT paged for laryngoscopy. Palliative care, heme/onc, and IR involved regarding lymph node biopsy for diagnosis.        NEURO  #Extubated s/p intubation 2/2 AHRF w/ hypercapnia  Patient mentating well s/p extubation.  - d/c Precedex   - Ativan 1.0mg IVP PRN       PULMONARY   #Acute hypoxic and hypercapnic respiratory failure  Possibly 2/2 aspiration PNA vs esophogeal mass effect on trachea.  CT pointing towards most likely bilateral aspiration PNA, but cannot r/o increased mass effect compromising airway.  - s/p extubation, now satting well on 1LNC    - d/c vanc and Zosyn  - start ceftriaxone 2g IV q24         #Inspiratory stridor s/p extubation   - ENT consult for laryngoscopy regarding vocal cord paralysis , c/f for worsening R sided paralysis or new L sided paralysis   - Ativan 1.0 mg IVP PRN   - solumedrol 40 mg tid        #Potential History of COPD/Asthma  Called pharmacy for med rec, on albuterol and symbicort at home.  - c q6 duonebs   - CTM respiratory status        CARDIOVASCULAR  #HTN  On atenolol at home. BPs stable.  - hold home atenolol for now         GI  #Esophogeal Obstruction  Mass effect contributing to aspiration risk.   - Can consider PEG given multiple aspirations, awaiting GOC discussion and oncology recs   - NPO for potential IR procedure today        ENDOCRINE  - GITA        RENAL  Overnight patient had low UOP, given total of 2L LR boluses. BUN increased to 23 from 14, creatinine uptrending to 1.81 from 1.31. Can't rule out transient intrinsic insult s/p CTA with contrast. However FeNa 0.1% suggesting pre renal etiology possibly 2/2 to poor PO intake prior to admission.   - CTM BUN/Cr   - avoid nephrotoxic meds if able  - will pull ching and trial to void        INFECTIOUS DISEASE  #B/l pneumonia  Likely aspiration given history of esophogeal mass and Seen on CT Chest  - d/c vanc and zosyn   - start ceftriaxone 2g IV q24    - blood cxs NG @ 1 day  - f/u sputum cx       HEMATOLOGY/ONCOLOGY  # Esophageal cancer   New diagnosis, oncology f/u outpt had described a differential of (primary esophogeal, thyroid, lymphoma) and was scheduled for a L supraclavicular node biopsy on 8/18. Oncology consulted, verbally let us know that he has never had the mass or enlarged lymph nodes biopsied. A biopsy would be needed to determine route of care (if solid cancer would lean radiation, if lymphoma would lean medical/chemo management). Family amenable to lymph node fine needle aspiration for diagnosis, however core biopsy needed per onc and family on board with this.   - palliative and heme/onc on-board, appreciate recs   - core biopsy IR procedure order placed  - ongoing GOC discussions with family        PPX  F: none  E: replete PRN  N: NPO  DVT PPx: heparin subq, SCDs   Dispo: DNR/Do not reintubate

## 2023-08-18 NOTE — PROGRESS NOTE ADULT - PROBLEM SELECTOR PLAN 1
Patient was extubated for a biopsy, but has had a lot of strider and was placed on bipap. Patient was scoped and saw Cancer obstructing airway. Per discussion with family plan for comfort care once wife sees him. Please order Dilaudid 0.5mg q1h IV PRN.

## 2023-08-18 NOTE — PROGRESS NOTE ADULT - PROBLEM SELECTOR PLAN 3
Patient found to have b/l pneumonia, currently on Azithromycin, and ceftriaxone and Zosyn. Continue care as per ICU team

## 2023-08-18 NOTE — PROGRESS NOTE ADULT - CONVERSATION DETAILS
In addition to the EM visit, an advance care planning meeting was performed  Start time: 300pm  End time: 320pm  Total time:  20 minutes  A face to face and telephone meeting to discuss advance care planning was held today regarding: CARLOS CALDERA  Primary decision maker: Patient is unable to make decisions for himself  Alternate/surrogate: Shilpi (wife) and Carlos (son)  Discussed advance directives including, but not limited to, healthcare proxy and code status.  Decision regarding code status: DNR/DNI  Documentation completed today: [facility] [place] Chart    Discussion had with all the patient wife over the phone and with the patient son and granddaughter in person prior to having discussion with wife. Wife does not want patient to die, and it was explained that the patient is very sick and that he is dying related to his cancer that is compressing his airway. She does not want him to suffer but also doesn't want him to die, it was explained that any intervention to fix this would cause suffering and  prolong his dying process. The use of Opioids to help his breathing was discussed and she was in agreement after she sees him. Emotional support was provided

## 2023-08-18 NOTE — PROGRESS NOTE ADULT - SUBJECTIVE AND OBJECTIVE BOX
RAMANDEEP CALDERA             MRN-[id number]    CC: Acute Hypoxic Respiratory Failure    HPI:  89 yo M PMH recently diagnosed esophageal cancer, aspirations and vocal cord paralysis who presented to the ED for 1 day of worsening dyspnea. Pt recently presented to [facility] on 08/08 for 3 months of hoarseness and difficulty swallowing, evaluated by ENT, found to have esophageal mass/cancer with R vocal cord paralysis. This AM pt presented to the ED for worsening shortness of breath and increasing lethargy/fatigue. On arrival pt was found to be hypoxic to 90% breathing at 27/min. BiPAP was initiated however pt became more lethargic w/ AMS and worsening CO2 retention. Pt was intubated and admitted to the MICU.     In the ED:  Initial vital signs: T: 98.3 HR: 101, BP: 191/98, R: 27, SpO2: 90% on RA  Labs: significant for WBC 6.2, Hgb 9.1, Trop 256 -> 194, Na 141, K 4.0, Cr 1.31, lactate 1.8, BNP 1780, VpH 7.29 -> 7.08 -> 7.17  Imaging: CTA chest: New, bibasilar patchy lung opacities since prior CT chest 8/8/2023, most suggestive of pneumonia. Stable upper thoracic esophageal mass and stable lymphadenopathy, likely metastatic. Negative for pulmonary embolus.  EKG: NSR, LBBB   Medications: CTX 2g,  Azithro 500, Mg 2g, Solumedrol 125mg, duonebs    In the MICU patient arrived intubated on propofol, in NAD, and was transitioned to zosyn.   Patient's family was called to let them know pt was in the hospital and to collect collateral on the patient. The patient's wife Shilpi noted that Mr. Caldera  was scheduled to get a biopsy of his left supraclavicular node that was noted to be enlarged, biopsy was scheduled for tomorrow 8/18. Spoke with Mr. Caldera's granddaughter as well who worked as a palliative care nurse, she stated that they had family discussions prior to this admission/event regarding Mr. Caldera's goals of care and health plans moving forward. She noted that Mr. Caldera believed he was "indio to be alive" and was open to continued discussions about how he wanted his life/health care plans to play out.  (17 Aug 2023 10:18)    SUBJECTIVE: Patient was extubated earlier this morning and continues to have stridor. Patient remains with no mental status, but is requiring BIPAP to keep his airway open,     ROS:  DYSPNEA (Linda): 2	  NAUS/VOM: N	  SECRETIONS: N	  AGITATION: N  Pain (Y/N):     N  -Provocation/Palliation: n/a   -Quality/Quantity: n/a  -Radiating:  n/a  -Severity:  n/a  -Timing/Frequency:  n/a  -Impact on ADLs:  n/a    OTHER REVIEW OF SYSTEMS: unable to obtain  UNABLE TO OBTAIN  due to: AMS    PEx:  T(C): 35.9 (08-18-23 @ 14:42), Max: 37.1 (08-17-23 @ 22:22)  HR: 115 (08-18-23 @ 14:00) (62 - 115)  BP: 181/83 (08-18-23 @ 14:00) (95/54 - 183/80)  RR: 16 (08-18-23 @ 14:00) (14 - 22)  SpO2: 99% (08-18-23 @ 14:00) (93% - 100%)  Wt(kg): 78.9kg    GENERAL:  [ ]Alert  [ ]Oriented x   [x ]Lethargic   [ ]Cachexia [ ]Verbal  [x ]Non-Verbal  Behavioral:   [ ] Anxiety  [ ] Delirium [ ] Agitation [x ] Other - calm   HEENT:  [ ]Normal   [x ]Dry mouth   [ ]ET Tube  [ ]Oral lesions [x] bipap mask  PULMONARY:   [ ]Clear  [ ]Tachypnea  [ ]Audible excessive secretions  [x] stridor noted   [ ]Rhonchi     [ ]Right [ ]Left [ ]Bilateral  [ ]Crackles        [ ]Right [ ]Left [ ]Bilateral  [ ]Wheezing     [ ]Right [ ]Left [ ]Bilateral  CARDIOVASCULAR:    [ ]Regular [ ]Irregular [x ]Tachy  [ ]Tree [ ]Murmur [ ]Other  GASTROINTESTINAL:  [ x]Soft  [ ]Distended   [ ]+BS  [ ]Non tender [ x]Tender  [ ]PEG [ ]OGT/NGT  [] flexiseal with output  Last BM:   GENITOURINARY:  [ ]Normal [ ] Incontinent   [ ]Oliguria/Anuria   [x ]Mulligan  MUSCULOSKELETAL:   [ ]Normal   [ ]Weakness  [x ]Bed/Wheelchair bound [ ]Edema  NEUROLOGIC:   [ ]No focal deficits  [ x] Cognitive impairment  [ ] Dysphagia [ ]Dysarthria [ ] Paresis [  ]Other   SKIN:   [ x]Normal   [ ]Pressure ulcer(s)  [ ]Rash     ALLERGIES: No Known Allergies    OPIATE NAÏVE (Y/N): Y    MEDICATIONS: REVIEWED  MEDICATIONS  (STANDING):  albuterol/ipratropium for Nebulization 3 milliLiter(s) Nebulizer every 6 hours  cefTRIAXone   IVPB 2000 milliGRAM(s) IV Intermittent every 24 hours  chlorhexidine 2% Cloths 1 Application(s) Topical <User Schedule>  heparin   Injectable 5000 Unit(s) SubCutaneous every 8 hours  methylPREDNISolone sodium succinate Injectable 40 milliGRAM(s) IV Push every 8 hours    MEDICATIONS  (PRN):    LABS: REVIEWED  CBC:                        7.2    10.39 )-----------( 164      ( 18 Aug 2023 03:30 )             22.7     CMP:    08-18    138  |  101  |  23  ----------------------------<  231<H>  3.5   |  24  |  1.81<H>    Ca    8.1<L>      18 Aug 2023 03:30  Phos  3.0     08-18  Mg     1.8     08-18    TPro  6.1  /  Alb  3.1<L>  /  TBili  0.7  /  DBili  x   /  AST  16  /  ALT  10  /  AlkPhos  46  08-18    IMAGING: REVIEWED    ADVANCED DIRECTIVES:            DNR DNI            LIVING WILL            DPOA       HCP       MOLST    DECISION MAKER:   LEGAL SURROGATE:    PSYCHOSOCIAL-SPIRITUAL ASSESSMENT:       Reviewed       Care plan unchanged       Care plan adjusted as above	    GOALS OF CARE DISCUSSION       Palliative care info/counseling provided	           Family meeting       Advanced Directives addressed please see Advance Care Planning Note	           See previous Palliative Medicine Note       Documentation of GOC:   	      AGENCY CHOICE DISCUSSED:           Homecare        Hospice        [facility]        [facility]        Other:         REFERRALS:	        Palliative Med        Unit SW/Case Mgmt              Speech/Swallow       Patient/Family Support       Massage Therapy       Music Therapy       Pet Therapy       Hospice       Nutrition       Dietician       PT/OT

## 2023-08-18 NOTE — CONSULT NOTE ADULT - ASSESSMENT
ASSESSMENT/PLAN:    IMPRESSION: RAMANDEEP CALDERA  is a 89y Male with recent diagnosis of esophageal mass and right vocal cord paralysis, who was admitted overnight after intubation in ED for respiratory distress. Extubated this morning but developed acute onset of stridor after extubation for which ENT is consulted. Exam notable for respiratory distress, inspiratory stridor, with flexible laryngoscopy concerning for increased secretions, minimal left cord abduction and right cord paralysis. Etiology of right cord paralysis possibly related to esophageal mass effect on RLN/right laryngeal complex. REcent intubation likely contributred some degree of inflammation of left cord leading to acute narrowing of the airway post extubation. Patient is notably DNR/DNI after recent GOC discussions    RECOMMENDATIONS:  - Would consider intubation given airway findings and acute inspiratory stridor  - If goals of care are discussed with patient/HCP and patient wishes to not be intubated, can consider BIPAP for palliative positive pressure vs. comfort measures  - If patient wishes to be intubated, likely outcome would be requirement of tracheostomy given the findings noted above. This should be discussed with family and be included in goals of care discussion  - Remainder of care per primary team    ---  Thank you for the kind referral and for allowing me to share in the care of RAMANDEEP CALDERA If you have any questions, please do not hesitate to contact me.     Sincerely,  Francesco Mcneill MD PGY2  08-18-23 @ 13:24

## 2023-08-18 NOTE — CONSULT NOTE ADULT - SUBJECTIVE AND OBJECTIVE BOX
OTOLARYNGOLOGY (ENT) CONSULTATION NOTE    PATIENT: RAMANDEEP CALDERA     MRN: [id number]       : [birth year]  DATE OF ADMISSION:23  DATE OF SERVICE:  23 @ 13:24    CHIEF COMPLAINT: post extubation stridor    HISTORY OF PRESENT ILLNESS:  RAMANDEEP CALDERA  is a 89y Male hx aneurysm, recently diagnosed esophageal mass with right VF paralysis, who presented to ED on  with shortness of breath and was intubated in the ED due to desaturation and AMS. Patient was t    PAST MEDICAL HISTORY:  HTN (hypertension)  History of intracranial aneurysm  Esophageal mass    CURRENT MEDICATIONS   acetaminophen   IVPB .. 1000 IV Intermittent  albuterol/ipratropium for Nebulization 3 Nebulizer  cefTRIAXone   IVPB 2000 IV Intermittent  chlorhexidine 2% Cloths 1 Topical  heparin   Injectable 5000 SubCutaneous  methylPREDNISolone sodium succinate Injectable 40 IV Push    HOME MEDICATIONS:  albuterol 90 mcg/inh inhalation aerosol  amitriptyline 10 mg oral tablet  atenolol 50 mg oral tablet  diazePAM 5 mg oral tablet  Symbicort 160 mcg-4.5 mcg/inh inhalation aerosol    ALLERGIES:  No Known Allergies    SOCIAL HISTORY: Pertinent included in HPI   FAMILY HISTORY      SURGICAL HISTORY:    REVIEW OF SYSTEMS: The patient was asked and responded to a review of systems regarding the following symptoms: constitutional, eye, ears, nose, mouth, throat, cardiovascular, respiratory. Pertinent factors have been included in the HPI.     PHYSICAL EXAMINATION:  General: NAD, A+Ox3  Respiratory: No respiratory distress, stridor, or stertor  Voice quality: normal  Face:  Symmetric without masses or lesions  OU: EOMI  AD: Pinna wnl, EAC clear, TM intact, no effusion  AS: Pinna wnl, EAC clear, TM intact, no effusion  Nose: nasal cavity clear bilaterally, inferior turbinates normal, mucosa normal without crusting or bleeding  OC/OP: tongue normal, floor of mouth WNL, no masses or lesions, OP clear  Neck: soft/flat, no LAD  Neuro: CNII-XII intact    Vital Signs:  T(C): 37 (23 @ 11:20), Max: 37.1 (23 @ 22:22)  HR: 91 (23 @ 13:07) (59 - 102)  BP: 166/73 (23 @ 12:00) (95/54 - 172/75)  RR: 18 (23 @ 12:30) (14 - 22)  SpO2: 100% (23 @ 13:07) (93% - 100%)                        7.2    10.39 )-----------( 164      ( 18 Aug 2023 03:30 )             22.7        138  |  101  |  23  ----------------------------<  231<H>  3.5   |  24  |  1.81<H>    Ca    8.1<L>      18 Aug 2023 03:30  Phos  3.0       Mg     1.8         TPro  6.1  /  Alb  3.1<L>  /  TBili  0.7  /  DBili  x   /  AST  16  /  ALT  10  /  AlkPhos  46  18   PT/INR - ( 18 Aug 2023 03:30 )   PT: 12.7 sec;   INR: 1.12          PTT - ( 18 Aug 2023 03:30 )  PTT:24.9 [id number]    MICROBIOLOGY:      PATHOLOGY:                -------------------------------  ASSESSMENT/PLAN:    IMPRESSION: RAMANDEEP CALDERA  is a 89y Male with     RECOMMENDATIONS:    ---  Thank you for the kind referral and for allowing me to share in the care of RAMANDEEP CALDERA If you have any questions, please do not hesitate to contact me.     Sincerely,  Suzanne Alexander PA-C  23 @ 13:24     OTOLARYNGOLOGY (ENT) CONSULTATION NOTE    PATIENT: RAMANDEEP CALDERA     MRN: [id number]       : [birth year]  DATE OF ADMISSION:23  DATE OF SERVICE:  23 @ 13:24    CHIEF COMPLAINT: post extubation stridor    HISTORY OF PRESENT ILLNESS:  RAMANDEEP CALDERA  is a 89y Male hx aneurysm, recently diagnosed esophageal mass with right VF paralysis, who presented to ED on  with shortness of breath and was intubated in the ED due to desaturation and AMS. Patient was admitted ot MICU overnight, extubated this morning but developed stridor post extubation. ENT called to assess upper airway in setting of post extubation stridor and known right vocal fold paralysis. Previously, scope on  had demonstrated right cord paralysis and ENT reccomendation was for CT scan aortic arch to skull base to assess for lesion along course of RLN; the esophageal mass was noted incidentally on this scan. Possible etiology for source of cord paralysis. Plan was to have patient follow up outpatient with laryngology to assess candidacy for injection laryngoplasty given evidence of aspiration. However, now returned to ED in respiratory distress.     History limited at time of ENT evaluation secondary to acute respiratory distress and stridor, as well as recent benzodiazepine administration by team. Per daughter, after goals of care discussion patient was recently made DNR/DNI status. There was a plan for outpatient biopsy of supraclavicular node to assess oncologic stage of esophageal mass however the tentative plan was for palliative RT as treatment. Patient acutely stridulous at time of exam, saturation >90% with 4LNC but desaturating when removed.     PAST MEDICAL HISTORY:  HTN (hypertension)  History of intracranial aneurysm  Esophageal mass    CURRENT MEDICATIONS   acetaminophen   IVPB .. 1000 IV Intermittent  albuterol/ipratropium for Nebulization 3 Nebulizer  cefTRIAXone   IVPB 2000 IV Intermittent  chlorhexidine 2% Cloths 1 Topical  heparin   Injectable 5000 SubCutaneous  methylPREDNISolone sodium succinate Injectable 40 IV Push    HOME MEDICATIONS:  albuterol 90 mcg/inh inhalation aerosol  amitriptyline 10 mg oral tablet  atenolol 50 mg oral tablet  diazePAM 5 mg oral tablet  Symbicort 160 mcg-4.5 mcg/inh inhalation aerosol    ALLERGIES:  No Known Allergies    SOCIAL HISTORY: Pertinent included in HPI   FAMILY HISTORY      SURGICAL HISTORY:    REVIEW OF SYSTEMS: The patient was asked and responded to a review of systems regarding the following symptoms: constitutional, eye, ears, nose, mouth, throat, cardiovascular, respiratory. Pertinent factors have been included in the HPI.     PHYSICAL EXAMINATION:  General: Not alert or oriented, unable to answer questions   Respiratory: In respiratory distress, audible inspiratory stridor  Voice quality: normal  Face:  Symmetric without masses or lesions  OU: EOMI  AD: Pinna wnl  AS: Pinna wnl  Nose: nasal cavity clear bilaterally, inferior turbinates normal, mucosa normal without crusting or bleeding  OC/OP: tongue normal, floor of mouth WNL, no masses or lesions, OP clear  Neck: soft/flat, no LAD  Neuro: unable to assess cranial nerves secondary to mental status    Vital Signs:  T(C): 37 (23 @ 11:20), Max: 37.1 (23 @ 22:22)  HR: 91 (23 @ 13:07) (59 - 102)  BP: 166/73 (23 @ 12:00) (95/54 - 172/75)  RR: 18 (23 @ 12:30) (14 - 22)  SpO2: 100% (23 @ 13:07) (93% - 100%)                        7.2    10.39 )-----------( 164      ( 18 Aug 2023 03:30 )             22.7    08-18    138  |  101  |  23  ----------------------------<  231<H>  3.5   |  24  |  1.81<H>    Ca    8.1<L>      18 Aug 2023 03:30  Phos  3.0     08-18  Mg     1.8     18    TPro  6.1  /  Alb  3.1<L>  /  TBili  0.7  /  DBili  x   /  AST  16  /  ALT  10  /  AlkPhos  46  08-18   PT/INR - ( 18 Aug 2023 03:30 )   PT: 12.7 sec;   INR: 1.12          PTT - ( 18 Aug 2023 03:30 )  PTT:24.9 [id number]    MICROBIOLOGY:      PATHOLOGY:                -------------------------------  ASSESSMENT/PLAN:    IMPRESSION: RAMANDEEP CALDERA  is a 89y Male with     RECOMMENDATIONS:    ---  Thank you for the kind referral and for allowing me to share in the care of RAMANDEEP CALDERA If you have any questions, please do not hesitate to contact me.     Sincerely,  Suzanne Alexander PA-C  23 @ 13:24     OTOLARYNGOLOGY (ENT) CONSULTATION NOTE    PATIENT: RAMANDEEP CALDERA     MRN: [id number]       : [birth year]  DATE OF ADMISSION:23  DATE OF SERVICE:  23 @ 13:24    CHIEF COMPLAINT: post extubation stridor    HISTORY OF PRESENT ILLNESS:  RAMANDEEP CALDERA  is a 89y Male hx aneurysm, recently diagnosed esophageal mass with right VF paralysis, who presented to ED on  with shortness of breath and was intubated in the ED due to desaturation and AMS. Patient was admitted ot MICU overnight, extubated this morning but developed stridor post extubation. ENT called to assess upper airway in setting of post extubation stridor and known right vocal fold paralysis. Previously, scope on  had demonstrated right cord paralysis and ENT reccomendation was for CT scan aortic arch to skull base to assess for lesion along course of RLN; the esophageal mass was noted incidentally on this scan. Possible etiology for source of cord paralysis. Plan was to have patient follow up outpatient with laryngology to assess candidacy for injection laryngoplasty given evidence of aspiration. However, now returned to ED in respiratory distress.     History limited at time of ENT evaluation secondary to acute respiratory distress and stridor, as well as recent benzodiazepine administration by team. Per daughter, after goals of care discussion patient was recently made DNR/DNI status. There was a plan for outpatient biopsy of supraclavicular node to assess oncologic stage of esophageal mass however the tentative plan was for palliative RT as treatment. Patient acutely stridulous at time of exam, saturation >90% with 4LNC but desaturating when removed.     PAST MEDICAL HISTORY:  HTN (hypertension)  History of intracranial aneurysm  Esophageal mass    CURRENT MEDICATIONS   acetaminophen   IVPB .. 1000 IV Intermittent  albuterol/ipratropium for Nebulization 3 Nebulizer  cefTRIAXone   IVPB 2000 IV Intermittent  chlorhexidine 2% Cloths 1 Topical  heparin   Injectable 5000 SubCutaneous  methylPREDNISolone sodium succinate Injectable 40 IV Push    HOME MEDICATIONS:  albuterol 90 mcg/inh inhalation aerosol  amitriptyline 10 mg oral tablet  atenolol 50 mg oral tablet  diazePAM 5 mg oral tablet  Symbicort 160 mcg-4.5 mcg/inh inhalation aerosol    ALLERGIES:  No Known Allergies    SOCIAL HISTORY: Pertinent included in HPI   FAMILY HISTORY      SURGICAL HISTORY:    REVIEW OF SYSTEMS: The patient was asked and responded to a review of systems regarding the following symptoms: constitutional, eye, ears, nose, mouth, throat, cardiovascular, respiratory. Pertinent factors have been included in the HPI.     PHYSICAL EXAMINATION:  General: Not alert or oriented, unable to answer questions   Respiratory: In respiratory distress, audible inspiratory stridor  Voice quality: normal  Face:  Symmetric without masses or lesions  OU: EOMI  AD: Pinna wnl  AS: Pinna wnl  Nose: nasal cavity clear bilaterally, inferior turbinates normal, mucosa normal without crusting or bleeding  OC/OP: tongue normal, floor of mouth WNL, no masses or lesions, OP clear  Neck: soft/flat, no LAD  Neuro: unable to assess cranial nerves secondary to mental status    PROCEDURE NOTE:PROCEDURE NOTE:    Procedure: Flexible laryngoscopy (CPT 49939)     Pre-Procedure Diagnosis: stridor     Post-Procedure Diagnosis: right vocal cord paralysis, incomplete abduction on left, airway narrowed, increased secretions    Indications for Procedure: stridor      Description of Procedure: After obtaining verbal consent, a flexible fiberoptic laryngoscope was inserted into the right nasal cavity. The nasal anatomy was examined for evidence of  nasal cavity obstruction. The septum was examined for clinically significant deviation.  Passing the flexible scope into the oropharynx and hypopharynx allowed examination of the base of tongue and vallecula and epiglottis. The piriform sinuses were examined for lesions and pooling of secretions or visible aspiration. The false vocal cords and true vocal cords were examined. The true vocal cords were examined for mobility, symmetry and closure. The visualized portion of the subglottis examined. The pharynx was examined for  visible extrinsic compression. The patient tolerated the procedure well without complications.      Findings: Right vocal fold paresis vs paralysis with minimal glottic gap, increased thick postcricoid secretions, left cord with minimal abduction on inspiration. gross aspiration of secretions noted.     Vital Signs:  T(C): 37 (23 @ 11:20), Max: 37.1 (23 @ 22:22)  HR: 91 (23 @ 13:07) (59 - 102)  BP: 166/73 (23 @ 12:00) (95/54 - 172/75)  RR: 18 (23 @ 12:30) (14 - 22)  SpO2: 100% (23 @ 13:07) (93% - 100%)                        7.2    10.39 )-----------( 164      ( 18 Aug 2023 03:30 )             22.7    08-18    138  |  101  |  23  ----------------------------<  231<H>  3.5   |  24  |  1.81<H>    Ca    8.1<L>      18 Aug 2023 03:30  Phos  3.0     08-18  Mg     1.8     18    TPro  6.1  /  Alb  3.1<L>  /  TBili  0.7  /  DBili  x   /  AST  16  /  ALT  10  /  AlkPhos  46  08-18   PT/INR - ( 18 Aug 2023 03:30 )   PT: 12.7 sec;   INR: 1.12          PTT - ( 18 Aug 2023 03:30 )  PTT:24.9 [id number]            -------------------------------

## 2023-08-19 NOTE — DIETITIAN INITIAL EVALUATION ADULT - ADD RECOMMEND
1. Keep nutrition aligned with GOC at all times   2. F/u with SLP regarding PO diet advancement  3. If short-term nutrition support desired, see recs above   4. Monitor lytes and replete prn. POC BG q6hrs   5. Pain and bowel regimens per team   *dw MICU team

## 2023-08-19 NOTE — DIETITIAN INITIAL EVALUATION ADULT - OTHER CALCULATIONS
Ideal body weight used for calculations as pt >120% of IBW (123% IBW). Needs estimated for age and adjusted for malignancy, current clinical condition

## 2023-08-19 NOTE — DIETITIAN INITIAL EVALUATION ADULT - PERTINENT MEDS FT
MEDICATIONS  (STANDING):  albuterol/ipratropium for Nebulization 3 milliLiter(s) Nebulizer every 6 hours  cefTRIAXone   IVPB 2000 milliGRAM(s) IV Intermittent every 24 hours  chlorhexidine 2% Cloths 1 Application(s) Topical <User Schedule>  heparin   Injectable 5000 Unit(s) SubCutaneous every 8 hours  methylPREDNISolone sodium succinate Injectable 40 milliGRAM(s) IV Push every 8 hours    MEDICATIONS  (PRN):  HYDROmorphone  Injectable 0.5 milliGRAM(s) IV Push every 1 hour PRN Dyspnea, Increased work of breathing, anxiety

## 2023-08-19 NOTE — PROGRESS NOTE ADULT - ASSESSMENT
89 yo M PMH recently diagnosed esophageal cancer, aspirations, and vocal cord paralysis presents to the ED for 1 day of worsening dyspnea found to be hypoxic to 90% w/ AMS and worsening CO2 retention, subsequently intubated and admitted to the MICU. Patient has inspiratory stridor s/p extubation 8/18 which may be due to progressive vocal cord compression vs VCD vs post extubation edema. ENT evaluation and laryngoscopy pending. Started with Solumedrol 40mg TID. As per family wishes, DNR/DNI. Palliative care, heme/onc, and IR involved regarding lymph node biopsy for diagnosis.        NEURO  #Extubated s/p intubation 2/2 AHRF w/ hypercapnia  Patient mentating well s/p extubation. Transitioned from BiPAP to HFNC  - d/c Precedex   - Ativan 1.0mg IVP PRN       PULMONARY   #Acute hypoxic and hypercapnic respiratory failure  Possibly 2/2 aspiration PNA vs esophogeal mass effect on trachea.  CT pointing towards most likely bilateral aspiration PNA, but cannot r/o increased mass effect compromising airway.  - s/p extubation, now satting well on 1LNC    - d/c vanc and Zosyn  - start ceftriaxone 2g IV q24     #Inspiratory stridor  Likely 2/2 progressive vocal cord compression vs VCD vs post extubation edema.   - ENT consulted for vocal cord injection   - Ativan 1.0 mg IVP PRN   - solumedrol 40 mg tid    #Potential History of COPD/Asthma  Called pharmacy for med rec, on albuterol and symbicort at home.  - c q6 duonebs   - CTM respiratory status      CARDIOVASCULAR  #HTN  On atenolol at home. BPs stable.  - hold home atenolol for now       GI  #Esophogeal Obstruction  Mass effect contributing to aspiration risk.   - Can consider PEG given multiple aspirations, awaiting GOC discussion and oncology recs   - NPO for potential IR procedure today    ENDOCRINE  - GITA        RENAL  Overnight patient had low UOP, given total of 2L LR boluses. BUN increased to 23 from 14, creatinine uptrending to 1.81 from 1.31. Can't rule out transient intrinsic insult s/p CTA with contrast. However FeNa 0.1% suggesting pre renal etiology possibly 2/2 to poor PO intake prior to admission.   - CTM BUN/Cr   - avoid nephrotoxic meds if able  - will pull ching and trial to void        INFECTIOUS DISEASE  #B/l pneumonia  Likely aspiration given history of esophogeal mass and Seen on CT Chest  - d/c vanc and zosyn   - start ceftriaxone 2g IV q24    - blood cxs NG @ 1 day  - f/u sputum cx       HEMATOLOGY/ONCOLOGY  # Esophageal cancer   New diagnosis, oncology f/u outpt had described a differential of (primary esophogeal, thyroid, lymphoma) and was scheduled for a L supraclavicular node biopsy on 8/18. Oncology consulted, verbally let us know that he has never had the mass or enlarged lymph nodes biopsied. A biopsy would be needed to determine route of care (if solid cancer would lean radiation, if lymphoma would lean medical/chemo management). Family amenable to lymph node fine needle aspiration for diagnosis, however core biopsy needed per onc and family on board with this.   - palliative and heme/onc on-board, appreciate recs   - core biopsy IR procedure order placed  - ongoing GOC discussions with family        PPX  F: none  E: replete PRN  N: NPO  DVT PPx: heparin subq, SCDs   Dispo: DNR/Do not reintubate

## 2023-08-19 NOTE — PROGRESS NOTE ADULT - SUBJECTIVE AND OBJECTIVE BOX
Patient is a 89y old  Male who presents with a chief complaint of PNEUMONIA; ACUTE RESPFAILURE   (19 Aug 2023 12:35)    INTERVAL HPI/OVERNIGHT EVENTS:   No overnight events   Afebrile, hemodynamically stable     SUBJECTIVE:  Patient seen and evaluated at bedside. The inspiratory stridor has become less noticeable. Patient denies shortness of breath and dyspnea.     ICU Vital Signs Last 24 Hrs  T(C): 36.3 (19 Aug 2023 09:27), Max: 36.3 (19 Aug 2023 09:27)  T(F): 97.3 (19 Aug 2023 09:27), Max: 97.3 (19 Aug 2023 09:27)  HR: 74 (19 Aug 2023 14:25) (50 - 83)  BP: 162/74 (19 Aug 2023 14:00) (86/49 - 170/76)  BP(mean): 106 (19 Aug 2023 14:00) (63 - 109)  ABP: --  ABP(mean): --  RR: 18 (19 Aug 2023 14:25) (15 - 20)  SpO2: 100% (19 Aug 2023 14:25) (95% - 100%)    O2 Parameters below as of 19 Aug 2023 14:25  Patient On (Oxygen Delivery Method): nasal cannula, high flow  O2 Flow (L/min): 50  O2 Concentration (%): 40      I&O's Summary    18 Aug 2023 07:01  -  19 Aug 2023 07:00  --------------------------------------------------------  IN: 200 mL / OUT: 1335 mL / NET: -1135 mL    19 Aug 2023 07:01  -  19 Aug 2023 14:33  --------------------------------------------------------  IN: 0 mL / OUT: 180 mL / NET: -180 mL      LABS:                        7.2    10.39 )-----------( 164      ( 18 Aug 2023 03:30 )             22.7     08-18    138  |  101  |  23  ----------------------------<  231<H>  3.5   |  24  |  1.81<H>    Ca    8.1<L>      18 Aug 2023 03:30  Phos  3.0     08-18  Mg     1.8     08-18    TPro  6.1  /  Alb  3.1<L>  /  TBili  0.7  /  DBili  x   /  AST  16  /  ALT  10  /  AlkPhos  46  08-18    PT/INR - ( 18 Aug 2023 03:30 )   PT: 12.7 sec;   INR: 1.12     PTT - ( 18 Aug 2023 03:30 )  PTT:24.9 sec    ABG - ( 18 Aug 2023 04:36 )  pH, Arterial: 7.46  pH, Blood: x     /  pCO2: 34    /  pO2: 154   / HCO3: 24    / Base Excess: 0.8   /  SaO2: 99.4      Consultant(s) Notes Reviewed:  [x ] YES  [ ] NO    MEDICATIONS  (STANDING):  albuterol/ipratropium for Nebulization 3 milliLiter(s) Nebulizer every 6 hours  cefTRIAXone   IVPB 2000 milliGRAM(s) IV Intermittent every 24 hours  chlorhexidine 2% Cloths 1 Application(s) Topical <User Schedule>  heparin   Injectable 5000 Unit(s) SubCutaneous every 8 hours  methylPREDNISolone sodium succinate Injectable 40 milliGRAM(s) IV Push every 8 hours    MEDICATIONS  (PRN):  HYDROmorphone  Injectable 0.5 milliGRAM(s) IV Push every 1 hour PRN Dyspnea, Increased work of breathing, anxiety      PHYSICAL EXAM:  General: NAD   Head: MMM; PERRL  Neck: Supple; no JVP elevation  Respiratory: inspiratory stridor on auscultation, no wheezes/rales/rhonchi  Cardiovascular: RRR; normal S1/S2, no murmurs, rubs gallops   Gastrointestinal: Soft; NTND; normoactive bowel sounds  Extremities: warm, well perfused, no edema/cyanosis  Neurological: AAOx3, no focal neural deficit  Skin: Clean and intact. Good skin turgor. Without open wounds and sores    Care Discussed with Consultants/Other Providers [ x] YES  [ ] NO

## 2023-08-19 NOTE — DIETITIAN INITIAL EVALUATION ADULT - PERTINENT LABORATORY DATA
08-18    138  |  101  |  23  ----------------------------<  231<H>  3.5   |  24  |  1.81<H>    Ca    8.1<L>      18 Aug 2023 03:30  Phos  3.0     08-18  Mg     1.8     08-18    TPro  6.1  /  Alb  3.1<L>  /  TBili  0.7  /  DBili  x   /  AST  16  /  ALT  10  /  AlkPhos  46  08-18

## 2023-08-19 NOTE — DIETITIAN INITIAL EVALUATION ADULT - NUTRITIONGOAL OUTCOME1
Nutrition will be kept aligned with GOC at all times; nutrition will be initiated via tolerated route w/in 72hrs

## 2023-08-19 NOTE — DIETITIAN INITIAL EVALUATION ADULT - OTHER INFO
89 yo M PMH recently diagnosed esophageal cancer, aspirations and vocal cord paralysis presents to the ED for 1 day of worsening dyspnea found to be hypoxic to 90% w/ AMS and worsening CO2 retention, subsequently intubated and admitted to the MICU for further management. Patient has inspiratory stridor s/p extubation today, given pushes of Ativan. ENT paged for laryngoscopy. Palliative care, heme/onc, and IR involved regarding lymph node biopsy for diagnosis. Pt seen in room, awake, alert, OOB in chair. Breathing on HFNC. Family member at bedside. Pt reported that prior to 3 weeks ago he was eating well and had no complaints of dysphagia. Does admit to ~18# weight loss x 1.5 years due to decreased appetite, unrelated to dysphagia (~8% loss, not significant for timeframe). LEILA. Saw SLP in ED on 8/8 and had MBS/VFSS done. Pt reports he was given thickener packets at that time (unsure for which consistency) but has been using those since. Also drinks Ensure often at home. Currently he is NPO I/s/o tenuous respiratory status and ongoing GOC discussions. He denied N/V/C/D/ or pain. Skin intact pressure-wise, no edema. Afebrile. Palliative team following. Discussed RD role and plan for PO diet advancement pending SLP recs and GOC. NFPE completed; some age-related sarcopenia noted, but overall not remarkable for protein-calorie malnutrition. Will continue to follow per RD protocol.

## 2023-08-20 NOTE — PROGRESS NOTE ADULT - PROBLEM SELECTOR PLAN 5
F: None   E: Replete as necessary K>4 Mg>2  N: f/u s and s recs   DVT Prophylaxis: heparin 5000  GI prophylaxis: None   CODE STATUS: dnr dni pt with anemia on initial presentation to ED 8/8 and then again this visit. No signs of bleeding throughout hospiatization and appears stable for now. DDx includes MELISSA, AOCD,   -active t&S   -consider iron studies

## 2023-08-20 NOTE — PROGRESS NOTE ADULT - SUBJECTIVE AND OBJECTIVE BOX
*** TRANSFER MICU --- >>> 7 [facility] ***    OVERNIGHT EVENTS:   None    SUBJECTIVE:    Patient seen and examined at bedside, comfortable, NAD. Denied fever, chest pain, dyspnea, abdominal pain.     Vital Signs Last 12 Hrs  T(F): 98.1 (08-20-23 @ 09:59), Max: 98.1 (08-20-23 @ 01:42)  HR: 82 (08-20-23 @ 10:00) (69 - 82)  BP: 160/74 (08-20-23 @ 10:00) (152/70 - 179/77)  BP(mean): 106 (08-20-23 @ 10:00) (97 - 110)  RR: 18 (08-20-23 @ 10:00) (18 - 22)  SpO2: 100% (08-20-23 @ 10:00) (98% - 100%)  I&O's Summary    19 Aug 2023 07:01  -  20 Aug 2023 07:00  --------------------------------------------------------  IN: 0 mL / OUT: 930 mL / NET: -930 mL    20 Aug 2023 07:01  -  20 Aug 2023 10:08  --------------------------------------------------------  IN: 0 mL / OUT: 40 mL / NET: -40 mL        PHYSICAL EXAM:  General: NAD   Head: MMM; PERRL  Neck: Supple; no JVP elevation  Respiratory: inspiratory stridor on auscultation, no wheezes/rales/rhonchi  Cardiovascular: RRR; normal S1/S2, no murmurs, rubs gallops   Gastrointestinal: Soft; NTND; normoactive bowel sounds  Extremities: warm, well perfused, no edema/cyanosis  Neurological: AAOx3, no focal neural deficit  Skin: Clean and intact. Good skin turgor. Without open wounds and sores        LABS:                        9.4    11.15 )-----------( 175      ( 20 Aug 2023 05:30 )             30.9     08-20    146<H>  |  107  |  35<H>  ----------------------------<  110<H>  4.1   |  27  |  1.51<H>    Ca    9.0      20 Aug 2023 05:30  Phos  3.8     08-20  Mg     2.4     08-20        Urinalysis Basic - ( 20 Aug 2023 05:30 )    Color: x / Appearance: x / SG: x / pH: x  Gluc: 110 mg/dL / Ketone: x  / Bili: x / Urobili: x   Blood: x / Protein: x / Nitrite: x   Leuk Esterase: x / RBC: x / WBC x   Sq Epi: x / Non Sq Epi: x / Bacteria: x          RADIOLOGY & ADDITIONAL TESTS:    MEDICATIONS  (STANDING):  albuterol/ipratropium for Nebulization 3 milliLiter(s) Nebulizer every 6 hours  cefTRIAXone   IVPB 2000 milliGRAM(s) IV Intermittent every 24 hours  chlorhexidine 2% Cloths 1 Application(s) Topical <User Schedule>  heparin   Injectable 5000 Unit(s) SubCutaneous every 8 hours  methylPREDNISolone sodium succinate Injectable 40 milliGRAM(s) IV Push every 12 hours    MEDICATIONS  (PRN):  HYDROmorphone  Injectable 0.5 milliGRAM(s) IV Push every 1 hour PRN Dyspnea, Increased work of breathing, anxiety   *** TRANSFER MICU --- >>> 7 [facility] ***    HOSPITAL COURSE  89 yo M PMH recently diagnosed esophageal cancer and right vocal cord paralysis presented to the ED for 1 day of worsening shortness of breath and increasing lethargy. On arrival pt was found to be hypoxic to 90% breathing at 27/min. BiPAP was initiated however pt became more lethargic w/ AMS and worsening CO2 retention. CTA chest showed new, bibasilar patchy lung opacities suggestive of pneumonia and was started on ceftriaxone. Patient was intubated and admitted to the MICU.     In the MICU patient arrived intubated on propofol, in NAD, and was transitioned to zosyn. Heme onc & palliative were consulted and the team spoke with the patient's family, with the pt made DNR and consented for biopsy of the esophogeal mass. Following intubation, blood gas showed improvement in the hypercapnia and he was subsequently successfully extubated. After extubation, worsening inspiratory stridor was noted and ENT was consulted, pt was scoped which showed worsened vocal cord paralysis now bilaterally and minimal airway. Epinephrine and comfort bipap was added, and in talks with the family the patient was made DNR/DNI. Patient deemed medically stable for step down to [facility].     OVERNIGHT EVENTS:   None    SUBJECTIVE:    Patient seen and examined at bedside, comfortable, NAD. Denied fever, chest pain, dyspnea, abdominal pain.     Vital Signs Last 12 Hrs  T(F): 98.1 (08-20-23 @ 09:59), Max: 98.1 (08-20-23 @ 01:42)  HR: 82 (08-20-23 @ 10:00) (69 - 82)  BP: 160/74 (08-20-23 @ 10:00) (152/70 - 179/77)  BP(mean): 106 (08-20-23 @ 10:00) (97 - 110)  RR: 18 (08-20-23 @ 10:00) (18 - 22)  SpO2: 100% (08-20-23 @ 10:00) (98% - 100%)    I&O's Summary    19 Aug 2023 07:01  -  20 Aug 2023 07:00  --------------------------------------------------------  IN: 0 mL / OUT: 930 mL / NET: -930 mL    20 Aug 2023 07:01  -  20 Aug 2023 10:08  --------------------------------------------------------  IN: 0 mL / OUT: 40 mL / NET: -40 mL      PHYSICAL EXAM:  General: NAD   Head: MMM; PERRL  Neck: Supple; no JVP elevation  Respiratory: inspiratory stridor on auscultation, no wheezes/rales/rhonchi  Cardiovascular: RRR; normal S1/S2, no murmurs, rubs gallops   Gastrointestinal: Soft; NTND; normoactive bowel sounds  Extremities: warm, well perfused, no edema/cyanosis  Neurological: AAOx3, no focal neural deficit  Skin: Clean and intact. Good skin turgor. Without open wounds and sores    LABS:                        9.4    11.15 )-----------( 175      ( 20 Aug 2023 05:30 )             30.9     08-20    146<H>  |  107  |  35<H>  ----------------------------<  110<H>  4.1   |  27  |  1.51<H>    Ca    9.0      20 Aug 2023 05:30  Phos  3.8     08-20  Mg     2.4     08-20    Urinalysis Basic - ( 20 Aug 2023 05:30 )    Color: x / Appearance: x / SG: x / pH: x  Gluc: 110 mg/dL / Ketone: x  / Bili: x / Urobili: x   Blood: x / Protein: x / Nitrite: x   Leuk Esterase: x / RBC: x / WBC x   Sq Epi: x / Non Sq Epi: x / Bacteria: x    RADIOLOGY & ADDITIONAL TESTS:  - reviewed    MEDICATIONS  (STANDING):  albuterol/ipratropium for Nebulization 3 milliLiter(s) Nebulizer every 6 hours  cefTRIAXone   IVPB 2000 milliGRAM(s) IV Intermittent every 24 hours  chlorhexidine 2% Cloths 1 Application(s) Topical <User Schedule>  heparin   Injectable 5000 Unit(s) SubCutaneous every 8 hours  methylPREDNISolone sodium succinate Injectable 40 milliGRAM(s) IV Push every 12 hours    MEDICATIONS  (PRN):  HYDROmorphone  Injectable 0.5 milliGRAM(s) IV Push every 1 hour PRN Dyspnea, Increased work of breathing, anxiety

## 2023-08-20 NOTE — PROGRESS NOTE ADULT - ATTENDING COMMENTS
MEDICATIONS  (STANDING):  albuterol/ipratropium for Nebulization 3 milliLiter(s) Nebulizer every 6 hours  cefTRIAXone   IVPB 2000 milliGRAM(s) IV Intermittent every 24 hours  chlorhexidine 2% Cloths 1 Application(s) Topical <User Schedule>  dextrose 5% + sodium chloride 0.45%. 1000 milliLiter(s) (50 mL/Hr) IV Continuous <Continuous>  heparin   Injectable 5000 Unit(s) SubCutaneous every 8 hours  methylPREDNISolone sodium succinate Injectable 40 milliGRAM(s) IV Push every 12 hours    A?P: 89 yo M PMH recently diagnosed esophageal cancer and right vocal cord paralysis with complex hospital course p/w worsening shortness of breath secondary to asp pna  and increasing lethargy s/p MICU admsission w/ intubation s/p extubation complicated by inspiratory stridor s/p evaluation by ENT tnow resolved transferred to [facility].    # Aspiration Pneumonia  -Continue NPO for now and aspiration precaution   -Will continue CTX until 8/25   - S&S evaluation     #Stridor  - Will continue Solumedrol and albuterol nebulizers   -Will have ENT reevaluate    #Hypernatremia  #BRETT  - Will start D51/2NS @50 cc/hr and monitor for fluid overload  - Will continue to monitor Na and creatinine     #Esophageal cancer  -Will f/u Oncology and Palliative care team re GOC ; Pt was initially scheduled to get a core biopsy IR on  08/18 however it was cancelled 2/2   respiratory distress    #DISPO  -PT evaluation   DNR/DNI MEDICATIONS  (STANDING):  albuterol/ipratropium for Nebulization 3 milliLiter(s) Nebulizer every 6 hours  cefTRIAXone   IVPB 2000 milliGRAM(s) IV Intermittent every 24 hours  chlorhexidine 2% Cloths 1 Application(s) Topical <User Schedule>  dextrose 5% + sodium chloride 0.45%. 1000 milliLiter(s) (50 mL/Hr) IV Continuous <Continuous>  heparin   Injectable 5000 Unit(s) SubCutaneous every 8 hours  methylPREDNISolone sodium succinate Injectable 40 milliGRAM(s) IV Push every 12 hours    A?P: 89 yo M PMH recently diagnosed esophageal cancer and right vocal cord paralysis with complex hospital course p/w worsening shortness of breath secondary to asp pna  and increasing lethargy s/p MICU admsission w/ intubation s/p extubation complicated by inspiratory stridor s/p evaluation by ENT tnow resolved transferred to [facility].    # Aspiration Pneumonia  -Continue NPO for now and aspiration precaution   -Will continue CTX until 8/25   - S&S evaluation     #Stridor  - Will continue Solumedrol and albuterol nebulizers   -Will have ENT reevaluate    #Hypernatremia  #BRETT  - Will start D51/2NS @50 cc/hr and monitor for fluid overload  - Will continue to monitor Na and creatinine     #Esophageal cancer  -Will f/u Oncology and Palliative care team re [facility] ; Pt was initially scheduled to get a core biopsy IR on  08/18 however it was cancelled 2/2   respiratory distress    #DISPO  -Will f/u Oncology and Palliative care team re [facility]  DNR/DNI

## 2023-08-20 NOTE — PROGRESS NOTE ADULT - PROBLEM SELECTOR PLAN 6
F: None   E: Replete as necessary K>4 Mg>2  N: f/u s and s recs   DVT Prophylaxis: heparin 5000  GI prophylaxis: None   CODE STATUS: dnr dni . Can't rule out transient intrinsic insult s/p CTA with contrast. However FeNa 0.1% suggesting pre renal etiology possibly 2/2 to poor PO intake prior to admission.   - CTM BUN/Cr   - avoid nephrotoxic meds if able  - will pull ching and trial to void

## 2023-08-20 NOTE — PROGRESS NOTE ADULT - ASSESSMENT
89 yo M PMH recently diagnosed esophageal cancer, aspirations, and vocal cord paralysis presents to the ED for 1 day of worsening dyspnea found to be hypoxic to 90% w/ AMS and worsening CO2 retention, subsequently intubated and admitted to the MICU. Patient has inspiratory stridor s/p extubation 8/18 which may be due to progressive vocal cord compression vs VCD vs post extubation edema. ENT evaluation and laryngoscopy pending. Started with Solumedrol 40mg TID. As per family wishes, DNR/DNI. Palliative care, heme/onc, and IR involved regarding lymph node biopsy for diagnosis.        NEURO  #Extubated s/p intubation 2/2 AHRF w/ hypercapnia  Patient mentating well s/p extubation. Transitioned from BiPAP to HFNC  - d/c Precedex   - Ativan 1.0mg IVP PRN       PULMONARY   #Acute hypoxic and hypercapnic respiratory failure  Possibly 2/2 aspiration PNA vs esophogeal mass effect on trachea.  CT pointing towards most likely bilateral aspiration PNA, but cannot r/o increased mass effect compromising airway.  - s/p extubation, now satting well on 1LNC    - d/c vanc and Zosyn  - start ceftriaxone 2g IV q24     #Inspiratory stridor  Likely 2/2 progressive vocal cord compression vs VCD vs post extubation edema.   - ENT consulted for vocal cord injection   - Ativan 1.0 mg IVP PRN   - solumedrol 40 mg tid    #Potential History of COPD/Asthma  Called pharmacy for med rec, on albuterol and symbicort at home.  - c q6 duonebs   - CTM respiratory status      CARDIOVASCULAR  #HTN  On atenolol at home. BPs stable.  - hold home atenolol for now       GI  #Esophogeal Obstruction  Mass effect contributing to aspiration risk.   - Can consider PEG given multiple aspirations, awaiting GOC discussion and oncology recs   - NPO for potential IR procedure today    ENDOCRINE  - GITA        RENAL  Overnight patient had low UOP, given total of 2L LR boluses. BUN increased to 23 from 14, creatinine uptrending to 1.81 from 1.31. Can't rule out transient intrinsic insult s/p CTA with contrast. However FeNa 0.1% suggesting pre renal etiology possibly 2/2 to poor PO intake prior to admission.   - CTM BUN/Cr   - avoid nephrotoxic meds if able  - will pull ching and trial to void        INFECTIOUS DISEASE  #B/l pneumonia  Likely aspiration given history of esophogeal mass and Seen on CT Chest  - d/c vanc and zosyn   - start ceftriaxone 2g IV q24    - blood cxs NG @ 1 day  - f/u sputum cx       HEMATOLOGY/ONCOLOGY  # Esophageal cancer   New diagnosis, oncology f/u outpt had described a differential of (primary esophogeal, thyroid, lymphoma) and was scheduled for a L supraclavicular node biopsy on 8/18. Oncology consulted, verbally let us know that he has never had the mass or enlarged lymph nodes biopsied. A biopsy would be needed to determine route of care (if solid cancer would lean radiation, if lymphoma would lean medical/chemo management). Family amenable to lymph node fine needle aspiration for diagnosis, however core biopsy needed per onc and family on board with this.   - palliative and heme/onc on-board, appreciate recs   - core biopsy IR procedure order placed  - ongoing GOC discussions with family        PPX  F: none  E: replete PRN  N: NPO  DVT PPx: heparin subq, SCDs   Dispo: DNR/Do not reintubate 89 yo M PMH recently diagnosed esophageal cancer, aspirations, and vocal cord paralysis presents to the ED for 1 day of worsening dyspnea found to be hypoxic to 90% w/ AMS and worsening CO2 retention, subsequently intubated and admitted to the MICU. Patient has inspiratory stridor s/p extubation 8/18 which may be due to progressive vocal cord compression vs VCD vs post extubation edema. ENT evaluation and laryngoscopy pending. Started with Solumedrol 40mg TID. As per family wishes, DNR/DNI. Palliative care, heme/onc, and IR involved regarding lymph node biopsy for diagnosis.      NEURO  #Extubated s/p intubation 2/2 AHRF w/ hypercapnia  Patient mentating well s/p extubation. Transitioned from BiPAP to HFNC  - Ativan 1.0mg IVP PRN   - will d/c HFNC today and transition to NC  - speech and swallow consulted    PULMONARY   #Acute hypoxic and hypercapnic respiratory failure  Possibly 2/2 aspiration PNA vs esophogeal mass effect on trachea.  CT pointing towards most likely bilateral aspiration PNA, but cannot r/o increased mass effect compromising airway.  - s/p extubation, now satting well on NC    - d/c vanc and Zosyn  - start ceftriaxone 2g IV q24     #Inspiratory stridor  Likely 2/2 progressive vocal cord compression vs VCD vs post extubation edema.   - ENT consulted for vocal cord injection   - Ativan 1.0 mg IVP PRN   - solumedrol 40 mg BID now (decreased from TID)    #Potential History of COPD/Asthma  Called pharmacy for med rec, on albuterol and symbicort at home.  - c q6 duonebs   - CTM respiratory status    CARDIOVASCULAR  #HTN  On atenolol at home. BPs stable.  - hold home atenolol for now     GI  #Esophogeal Obstruction  Mass effect contributing to aspiration risk.   - Can consider PEG given multiple aspirations, awaiting GOC discussion and oncology recs   - confer with IR about potential procedural biopsy    ENDOCRINE  - GITA    RENAL  Patient has had good UOP, given total of 2L LR boluses. BUN increased, creatinine down. Can't rule out transient intrinsic insult s/p CTA with contrast. However FeNa 0.1% suggesting pre renal etiology possibly 2/2 to poor PO intake prior to admission. Overall improving here.   - CTM BUN/Cr   - avoid nephrotoxic meds if able    INFECTIOUS DISEASE  #B/l pneumonia  Likely aspiration given history of esophogeal mass and Seen on CT Chest  - d/c'd vanc and zosyn   - c ceftriaxone 2g IV q24    - blood cxs NG @ 3 days  - f/u sputum cx     HEMATOLOGY/ONCOLOGY  # Esophageal cancer   New diagnosis, oncology f/u outpt had described a differential of (primary esophogeal, thyroid, lymphoma) and was scheduled for a L supraclavicular node biopsy on 8/18. Oncology consulted, verbally let us know that he has never had the mass or enlarged lymph nodes biopsied. A biopsy would be needed to determine route of care (if solid cancer would lean radiation, if lymphoma would lean medical/chemo management). Family amenable to lymph node fine needle aspiration for diagnosis, however core biopsy needed per onc and family on board with this.   - palliative and heme/onc on-board, appreciate recs   - core biopsy IR procedure order placed but pending GOC discussion   - ongoing GOC discussions with family    PPX  F: none  E: replete PRN  N: NPO  DVT PPx: heparin subq, SCDs   Dispo: DNR/Do not reintubate

## 2023-08-20 NOTE — PROGRESS NOTE ADULT - PROBLEM SELECTOR PLAN 1
Possibly 2/2 aspiration PNA vs esophogeal mass effect on trachea.  CT pointing towards most likely bilateral aspiration PNA, but cannot r/o increased mass effect compromising airway.  - s/p extubation, now satting well on NC    - c/w ceftriaxone 2g IV q24

## 2023-08-20 NOTE — PROGRESS NOTE ADULT - PROBLEM SELECTOR PLAN 3
ENT following  #Inspiratory stridor  Likely 2/2 progressive vocal cord compression vs VCD vs post extubation edema. Laryngoscope shows known right sided vocal paresis w/ possible left sided edema 2/2 intubation  - f/u ent recs   - Ativan 1.0 mg IVP PRN   - solumedrol 40 mg tid    Patient now DNR DNI

## 2023-08-20 NOTE — PROGRESS NOTE ADULT - PROBLEM SELECTOR PLAN 7
F: None   E: Replete as necessary K>4 Mg>2  N: f/u s and s recs   DVT Prophylaxis: heparin 5000  GI prophylaxis: None   CODE STATUS: dnr dni

## 2023-08-20 NOTE — PROGRESS NOTE ADULT - PROBLEM SELECTOR PLAN 2
**possible Esophageal cancer   New diagnosis, oncology f/u outpt had described a differential of (primary esophogeal, thyroid, lymphoma) and was scheduled for a L supraclavicular node biopsy on 8/18. Oncology consulted, verbally let us know that he has never had the mass or enlarged lymph nodes biopsied. A biopsy would be needed to determine route of care (if solid cancer would lean radiation, if lymphoma would lean medical/chemo management). Family amenable to lymph node fine needle aspiration for diagnosis, however core biopsy needed per onc and family on board with this.   - palliative and heme/onc on-board, appreciate recs   - core biopsy IR procedure order from 08/18 cancelled 2/2 respiratory distress  - ongoing GOC discussions with family New diagnosis, oncology f/u outpt had described a differential of (primary esophogeal, thyroid, lymphoma) and was scheduled for a L supraclavicular node biopsy on 8/18. Oncology consulted, verbally let us know that he has never had the mass or enlarged lymph nodes biopsied. A biopsy would be needed to determine route of care (if solid cancer would lean radiation, if lymphoma would lean medical/chemo management). Family amenable to lymph node fine needle aspiration for diagnosis, however core biopsy needed per onc and family on board with this.   - palliative and heme/onc on-board, appreciate recs   - core biopsy IR procedure order from 08/18 cancelled 2/2 respiratory distress  - ongoing GOC discussions with family

## 2023-08-20 NOTE — PROGRESS NOTE ADULT - SUBJECTIVE AND OBJECTIVE BOX
HOSPITAL COURSE  89 yo M PMH recently diagnosed esophageal cancer and right vocal cord paralysis presented to the ED for 1 day of worsening shortness of breath and increasing lethargy. On arrival pt was found to be hypoxic to 90% breathing at 27/min. BiPAP was initiated however pt became more lethargic w/ AMS and worsening CO2 retention. CTA chest showed new, bibasilar patchy lung opacities suggestive of pneumonia and was started on ceftriaxone. Patient was intubated and admitted to the MICU.     In the MICU patient arrived intubated on propofol, in NAD, and was transitioned to zosyn. Heme onc & palliative were consulted and the team spoke with the patient's family, with the pt made DNR and consented for biopsy of the esophogeal mass. Following intubation, blood gas showed improvement in the hypercapnia and he was subsequently successfully extubated. After extubation, worsening inspiratory stridor was noted and ENT was consulted, pt was scoped which showed worsened vocal cord paralysis now bilaterally and minimal airway. Epinephrine and comfort bipap was added, and in talks with the family the patient was made DNR/DNI.     OVERNIGHT EVENTS: naeo    SUBJECTIVE:  Patient seen and examined at bedside. Reports that breathing is OK. No pain at the moment. Would like to have a bowel movement, has not had good one in 4 days.     Vital Signs Last 12 Hrs  T(F): 98.1 (08-20-23 @ 09:59), Max: 98.1 (08-20-23 @ 01:42)  HR: 82 (08-20-23 @ 10:00) (69 - 82)  BP: 160/74 (08-20-23 @ 10:00) (152/70 - 179/77)  BP(mean): 106 (08-20-23 @ 10:00) (97 - 110)  RR: 18 (08-20-23 @ 10:00) (18 - 22)  SpO2: 100% (08-20-23 @ 10:00) (98% - 100%)  I&O's Summary    19 Aug 2023 07:01  -  20 Aug 2023 07:00  --------------------------------------------------------  IN: 0 mL / OUT: 930 mL / NET: -930 mL    20 Aug 2023 07:01  -  20 Aug 2023 10:59  --------------------------------------------------------  IN: 0 mL / OUT: 165 mL / NET: -165 mL      PHYSICAL EXAM:  General: NAD   Head: MMM; PERRL  Neck: Supple; no JVP elevation  Respiratory: ctab, no wheezes/rales/rhonchi  Cardiovascular: RRR; normal S1/S2, no murmurs, rubs gallops   Gastrointestinal: Soft; NTND; normoactive bowel sounds  Extremities: warm, well perfused, no edema/cyanosis  Neurological: AAOx3, no focal neural deficit  Skin: Clean and intact. Good skin turgor. Without open wounds and sores        LABS:                        9.4    11.15 )-----------( 175      ( 20 Aug 2023 05:30 )             30.9     08-20    146<H>  |  107  |  35<H>  ----------------------------<  110<H>  4.1   |  27  |  1.51<H>    Ca    9.0      20 Aug 2023 05:30  Phos  3.8     08-20  Mg     2.4     08-20        Urinalysis Basic - ( 20 Aug 2023 05:30 )    Color: x / Appearance: x / SG: x / pH: x  Gluc: 110 mg/dL / Ketone: x  / Bili: x / Urobili: x   Blood: x / Protein: x / Nitrite: x   Leuk Esterase: x / RBC: x / WBC x   Sq Epi: x / Non Sq Epi: x / Bacteria: x          RADIOLOGY & ADDITIONAL TESTS:    MEDICATIONS  (STANDING):  albuterol/ipratropium for Nebulization 3 milliLiter(s) Nebulizer every 6 hours  cefTRIAXone   IVPB 2000 milliGRAM(s) IV Intermittent every 24 hours  chlorhexidine 2% Cloths 1 Application(s) Topical <User Schedule>  heparin   Injectable 5000 Unit(s) SubCutaneous every 8 hours  methylPREDNISolone sodium succinate Injectable 40 milliGRAM(s) IV Push every 12 hours    MEDICATIONS  (PRN):  HYDROmorphone  Injectable 0.5 milliGRAM(s) IV Push every 1 hour PRN Dyspnea, Increased work of breathing, anxiety

## 2023-08-21 NOTE — PROGRESS NOTE ADULT - SUBJECTIVE AND OBJECTIVE BOX
CARLOS CALDERA             MRN-[id number]    CC: Acute Hypoxic Respiratory Failure    HPI:  89 yo M PMH recently diagnosed esophageal cancer, aspirations and vocal cord paralysis who presented to the ED for 1 day of worsening dyspnea. Pt recently presented to [facility] on 08/08 for 3 months of hoarseness and difficulty swallowing, evaluated by ENT, found to have esophageal mass/cancer with R vocal cord paralysis. This AM pt presented to the ED for worsening shortness of breath and increasing lethargy/fatigue. On arrival pt was found to be hypoxic to 90% breathing at 27/min. BiPAP was initiated however pt became more lethargic w/ AMS and worsening CO2 retention. Pt was intubated and admitted to the MICU.     In the ED:  Initial vital signs: T: 98.3 HR: 101, BP: 191/98, R: 27, SpO2: 90% on RA  Labs: significant for WBC 6.2, Hgb 9.1, Trop 256 -> 194, Na 141, K 4.0, Cr 1.31, lactate 1.8, BNP 1780, VpH 7.29 -> 7.08 -> 7.17  Imaging: CTA chest: New, bibasilar patchy lung opacities since prior CT chest 8/8/2023, most suggestive of pneumonia. Stable upper thoracic esophageal mass and stable lymphadenopathy, likely metastatic. Negative for pulmonary embolus.  EKG: NSR, LBBB   Medications: CTX 2g,  Azithro 500, Mg 2g, Solumedrol 125mg, duonebs    In the MICU patient arrived intubated on propofol, in NAD, and was transitioned to zosyn.   Patient's family was called to let them know pt was in the hospital and to collect collateral on the patient. The patient's wife Shilpi noted that Mr. Caldera  was scheduled to get a biopsy of his left supraclavicular node that was noted to be enlarged, biopsy was scheduled for tomorrow 8/18. Spoke with Mr. Caldera's granddaughter as well who worked as a palliative care nurse, she stated that they had family discussions prior to this admission/event regarding Mr. Caldera's goals of care and health plans moving forward. She noted that Mr. Caldera believed he was "indio to be alive" and was open to continued discussions about how he wanted his life/health care plans to play out.  (17 Aug 2023 10:18)    SUBJECTIVE: Patient resting in bed, no distress noted. Son at bedside.     ROS:  DYSPNEA (Linda): 0	  NAUS/VOM: N	  SECRETIONS: N  AGITATION: N  Pain (Y/N):  N     -Provocation/Palliation: n/a   -Quality/Quantity: n/a   -Radiating: n/a   -Severity: n/a   -Timing/Frequency: n/a   -Impact on ADLs: n/a     OTHER REVIEW OF SYSTEMS: + weakness   UNABLE TO OBTAIN  due to: n/a     PEx:  T(C): 36.9 (08-21-23 @ 11:00), Max: 36.9 (08-21-23 @ 09:18)  HR: 72 (08-21-23 @ 11:00) (71 - 72)  BP: 181/74 (08-21-23 @ 11:07) (163/80 - 183/80)  RR: 20 (08-21-23 @ 11:00) (16 - 20)  SpO2: 98% (08-21-23 @ 11:00) (98% - 100%)  Wt(kg): 78.9kg    GENERAL:  [x ]Alert  [ x]Oriented x  3 [ ]Lethargic   [ ]Cachexia [x ]Verbal  [ ]Non-Verbal  Behavioral:   [ ] Anxiety  [ ] Delirium [ ] Agitation [x ] Other - calm   HEENT:  [ ]Normal   [x ]Dry mouth   [ ]ET Tube  [ ]Oral lesions   PULMONARY:   [ x]Clear  [ ]Tachypnea  [ ]Audible excessive secretions     [ ]Rhonchi     [ ]Right [ ]Left [ ]Bilateral  [ ]Crackles        [ ]Right [ ]Left [ ]Bilateral  [ ]Wheezing     [ ]Right [ ]Left [ ]Bilateral  CARDIOVASCULAR:    [x ]Regular [ ]Irregular [ ]Tachy  [ ]Tree [ ]Murmur [ ]Other  GASTROINTESTINAL:  [ x]Soft  [ ]Distended   [ ]+BS  [x ]Non tender [ ]Tender  [ ]PEG [ ]OGT/NGT  [] flexiseal with output  Last BM:   GENITOURINARY:  [ ]Normal [ ] Incontinent   [ ]Oliguria/Anuria   [x ]Mulligan  MUSCULOSKELETAL:   [ ]Normal   [x ]Weakness  [ ]Bed/Wheelchair bound [ ]Edema  NEUROLOGIC:   [x ]No focal deficits  [ ] Cognitive impairment  [ ] Dysphagia [ ]Dysarthria [ ] Paresis [  ]Other   SKIN:   [ x]Normal   [ ]Pressure ulcer(s)  [ ]Rash     ALLERGIES: No Known Allergies      OPIATE NAÏVE (Y/N): Y    MEDICATIONS: REVIEWED  MEDICATIONS  (STANDING):  albuterol/ipratropium for Nebulization 3 milliLiter(s) Nebulizer every 6 hours  cefTRIAXone   IVPB 2000 milliGRAM(s) IV Intermittent every 24 hours  dextrose 5% + sodium chloride 0.45%. 1000 milliLiter(s) (50 mL/Hr) IV Continuous <Continuous>  methylPREDNISolone sodium succinate Injectable 40 milliGRAM(s) IV Push every 12 hours    MEDICATIONS  (PRN):  HYDROmorphone  Injectable 0.5 milliGRAM(s) IV Push every 1 hour PRN Dyspnea, Increased work of breathing, anxiety    LABS: REVIEWED  CBC:                        8.9    8.27  )-----------( 152      ( 21 Aug 2023 06:33 )             29.3     CMP:    08-21    147<H>  |  112<H>  |  36<H>  ----------------------------<  134<H>  4.5   |  30  |  1.29    Ca    8.8      21 Aug 2023 06:33  Phos  3.0     08-21  Mg     2.6     08-21    TPro  6.4  /  Alb  3.3  /  TBili  0.3  /  DBili  x   /  AST  19  /  ALT  15  /  AlkPhos  47  08-21    IMAGING: REVIEWED    ADVANCED DIRECTIVES:            DNR DNI            MOLST    DECISION MAKER:  Patient is able to make decisions for himself   LEGAL SURROGATE:Wife/Caregiver:   Shilpi Caldera  Phone: [phone]    Granddaughter/Emergency Contact (lives in [place]): Nanette Soto  Phone: [phone]    Son (lives in [place]): Carlos Caldera  Phone: [phone]    Brother (lives in [place]): Britton Caldera  Phone: [phone]    Granddaughter (lives in [place]): Agnieszka Cuadra  Phone: [phone]    PSYCHOSOCIAL-SPIRITUAL ASSESSMENT:       Reviewed       Care plan unchanged      GOALS OF CARE DISCUSSION       Palliative care info/counseling provided	           Family meeting       Advanced Directives addressed please see Advance Care Planning Note	           See previous Palliative Medicine Note       Documentation of GOC: DNR/DNI  	      AGENCY CHOICE DISCUSSED:     Home hospice         REFERRALS:	        Palliative Med        Hospice       PT/OT

## 2023-08-21 NOTE — PROGRESS NOTE ADULT - PROBLEM SELECTOR PLAN 1
Possibly 2/2 aspiration PNA vs esophogeal mass effect on trachea.  CT pointing towards most likely bilateral aspiration PNA, but cannot r/o increased mass effect compromising airway.  - s/p extubation, now satting well on room air, weaned of NC successfully  - c/w ceftriaxone 2g IV q24  - C/W methylprednisolone 40mg IVP Q12

## 2023-08-21 NOTE — PROGRESS NOTE ADULT - PROBLEM SELECTOR PLAN 2
/68 (BP Location: Right arm)   Pulse 69   Temp 98.2  F (36.8  C) (Oral)   Resp 20   Wt 73.7 kg (162 lb 6.4 oz)   SpO2 98%   BMI 29.70 kg/m      Time: 9172-0268  R. of admission/Status:admitted on 11/04/20 due to community acquired bilateral lower lobe PNA and COVID-19 positive.    Neuro:  A&Ox4  Activity: assist of one person   Pain: denied   Cardiac: SR, denied chest pain. /68, afebrile.   Respiratory: on high flow NC at 35 L/m, 70%. SOB getting better. Denied cough.   GI/: Last bm yesterday. Active bowel sound. Voided using the bedside commode.   Diet: consist carb diet. 7439-2752 calories. Denied nausea/vomiting.   Skin: no skin deficit.   LDAs: PIV saline locked.   Labs/Imaging: ACHS blood sugar.   New change this shift: none   Plan: continue the current POC. RT following her.      New diagnosis, oncology f/u outpt had described a differential of (primary esophogeal, thyroid, lymphoma) and was scheduled for a L supraclavicular node biopsy on 8/18. Oncology consulted, verbally let us know that he has never had the mass or enlarged lymph nodes biopsied. A biopsy would be needed to determine route of care (if solid cancer would lean radiation, if lymphoma would lean medical/chemo management). Family amenable to lymph node fine needle aspiration for diagnosis, however core biopsy needed per onc and family on board with this.   - palliative and heme/onc on-board, appreciate recs   - core biopsy IR procedure order from 08/18 cancelled 2/2 respiratory distress, re ordered today.   - ongoing GOC discussions with family

## 2023-08-21 NOTE — PROGRESS NOTE ADULT - PROBLEM SELECTOR PLAN 5
Patient confirmed his wishes to be kept comfortable and to remain a DNR/DNI. He does not want nay more invasive procedures likes a feeding tube because he does not want to prolong his suffering. Plan for biopsy and home hospice. Please try to give patient ensure.   - Bahai/Spiritual practice: Unknown   - Coping: [x ] well [ ] with difficulty [ ] poor coping    - Support system: [ x] strong [ ] adequate [ ] inadequate  - All questions answered, emotional support provided  -  primary team   - Please contact Palliative Medicine 24/7 at [phone] for any acute symptoms or further questions  - Will continue to follow with you.

## 2023-08-21 NOTE — PROGRESS NOTE ADULT - PROBLEM SELECTOR PLAN 7
F: None   E: Replete as necessary K>4 Mg>2  N: f/u s and s recs   DVT Prophylaxis: heparin 5000  GI prophylaxis: None   CODE STATUS: dnr dni . Can't rule out transient intrinsic insult s/p CTA with contrast. However FeNa 0.1% suggesting pre renal etiology possibly 2/2 to poor PO intake prior to admission.   - CTM BUN/Cr   - avoid nephrotoxic meds if able  - DC ching as not needed

## 2023-08-21 NOTE — SWALLOW BEDSIDE ASSESSMENT ADULT - SLP PERTINENT HISTORY OF CURRENT PROBLEM
Pt with h/o esophageal cancer, aspiration and R VF paralysis, presents with hypoxia, AMS, O2 retention requiring intubation. CT chest revealed new lung opacities consistent with PNA. Post extubation Pt developed stridor, was seen by ENT who noted L FV edema.

## 2023-08-21 NOTE — PROGRESS NOTE ADULT - PROBLEM SELECTOR PLAN 6
. Can't rule out transient intrinsic insult s/p CTA with contrast. However FeNa 0.1% suggesting pre renal etiology possibly 2/2 to poor PO intake prior to admission.   - CTM BUN/Cr   - avoid nephrotoxic meds if able  - DC ching as not needed pt with anemia on initial presentation to ED 8/8 and then again this visit. No signs of bleeding throughout hospiatization and appears stable for now, no need for immediate concern. DDx includes MELISSA, AOCD, Latest 8.9     -active t&S

## 2023-08-21 NOTE — PROGRESS NOTE ADULT - PROBLEM SELECTOR PLAN 4
Patient is known to Dr. Priest and patient wants to proceed with a biopsy to know what he has, but at the same time he wants to be comfortable at home.  Home hospice was discussed and patient and son are amenable to a referral. Appreciate Oncology involvement.

## 2023-08-21 NOTE — PROGRESS NOTE ADULT - PROBLEM SELECTOR PLAN 4
on home atenolol, currently being held resume as appropriate. Pt BP today 183/80     - as pt cant tolerate PO , IV Enalapril 1.25 IVP once, re order if required ENT following  #Inspiratory stridor  Likely 2/2 progressive vocal cord compression vs VCD vs post extubation edema. Laryngoscope shows known right sided vocal paresis w/ possible left sided edema 2/2 intubation  - f/u ent recs   - solumedrol 40 mg BID  - Outpatient cord injections as tolerated     Patient now DNR DNI

## 2023-08-21 NOTE — SWALLOW BEDSIDE ASSESSMENT ADULT - SWALLOW EVAL: DIAGNOSIS
Pt continues to present with severe pharyngo-esophageal dysphagia resulting in new aspiration event and respiratory failure

## 2023-08-21 NOTE — SWALLOW BEDSIDE ASSESSMENT ADULT - SWALLOW EVAL: RECOMMENDED DIET
NPO pending goals of care and treatment planning. Given site of malignancy and its impact to bolus flow, diet modifications or compensatory strategies are not appropriate nor beneficial.

## 2023-08-21 NOTE — PROGRESS NOTE ADULT - PROBLEM SELECTOR PLAN 1
Patient appears comfortable at this time given his Solumedrol use. Can also recommend MSIR 5mg q3h PO PRN.

## 2023-08-21 NOTE — SWALLOW BEDSIDE ASSESSMENT ADULT - COMMENTS
Pt is well known to this service. Pt s/p MBS on 8/8/23 which revealed a severe pharyngoesophageal dysphagia with impact to safety and efficiency of the swallow. Pt was deemed at increased risk of prandial and post prandial aspiration given impact to bolus passage secondary to site of malignancy regardless of diet modification and strategies. Furthermore, Pt deemed at risk for malnutrition.

## 2023-08-21 NOTE — PROGRESS NOTE ADULT - PROBLEM SELECTOR PLAN 3
Patient found to have b/l pneumonia, currently on ceftriaxone 2000mg daily.  Continue care as per primary team.

## 2023-08-21 NOTE — PROGRESS NOTE ADULT - ASSESSMENT
89 yo M PMH recently diagnosed esophageal cancer, aspirations, and vocal cord paralysis presents to the ED for 1 day of worsening dyspnea found to be hypoxic to 90% w/ AMS and worsening CO2 retention, subsequently intubated and admitted to the MICU. Patient has inspiratory stridor s/p extubation 8/18 which may be due to progressive vocal cord compression vs VCD vs post extubation edema. ENT evaluation and laryngoscopy pending. Started with Solumedrol 40mg TID. As per family wishes, DNR/DNI. Palliative care, heme/onc, and IR involved regarding lymph node biopsy for diagnosis.

## 2023-08-21 NOTE — PROGRESS NOTE ADULT - PROBLEM SELECTOR PLAN 3
ENT following  #Inspiratory stridor  Likely 2/2 progressive vocal cord compression vs VCD vs post extubation edema. Laryngoscope shows known right sided vocal paresis w/ possible left sided edema 2/2 intubation  - f/u ent recs   - solumedrol 40 mg BID  - Outpatient cord injections as tolerated     Patient now DNR DNI pt presented on 8/8 with complaints of difficulty swallowing, as a result of his esophageal mass. He was discharged on thick liquids. He then presented again on this latest admission with B/L aspiration pneumonia.     Plan    - pt kept NPO per speech& swallow rec. No action can be currently taken regarding oral feeding till the mass issue gets taken care of. Risk of aspiration is high.   - NG tube risk of perforating esophagus  - Pt declining PEG tube, but will re ask him to rescind his decision.  - currently kept on IV fluids pt presented on 8/8 with complaints of difficulty swallowing, as a result of his esophageal mass. He was discharged on thick liquids. He then presented again on this latest admission with B/L aspiration pneumonia.     Plan    - pt kept NPO per speech& swallow rec. No action can be currently taken regarding oral feeding. Risk of aspiration is high.   - NG tube risk of perforating esophagus  - No feeding tubes are recommended at this time  - currently kept on IV fluids

## 2023-08-21 NOTE — PROGRESS NOTE ADULT - PROBLEM SELECTOR PLAN 5
pt with anemia on initial presentation to ED 8/8 and then again this visit. No signs of bleeding throughout hospiatization and appears stable for now, no need for immediate concern. DDx includes MELISSA, AOCD, Latest 8.9     -active t&S on home atenolol, currently being held resume as appropriate. Pt BP today 183/80     - as pt cant tolerate PO , IV Enalapril 1.25 IVP once, re order if required

## 2023-08-21 NOTE — PROGRESS NOTE ADULT - SUBJECTIVE AND OBJECTIVE BOX
OVERNIGHT EVENTS: no overnight events    SUBJECTIVE / INTERVAL HPI: Patient seen and examined at bedside.     VITAL SIGNS:  Vital Signs Last 24 Hrs  T(C): 36.9 (21 Aug 2023 09:18), Max: 36.9 (21 Aug 2023 09:18)  T(F): 98.5 (21 Aug 2023 09:18), Max: 98.5 (21 Aug 2023 09:18)  HR: 72 (21 Aug 2023 09:18) (71 - 78)  BP: 183/80 (21 Aug 2023 09:18) (163/80 - 183/80)  BP(mean): 119 (21 Aug 2023 05:34) (119 - 119)  RR: 16 (21 Aug 2023 09:18) (16 - 20)  SpO2: 100% (21 Aug 2023 09:18) (100% - 100%)    Parameters below as of 21 Aug 2023 09:18  Patient On (Oxygen Delivery Method): nasal cannula  O2 Flow (L/min): 4      PHYSICAL EXAM:    General: NAD   Head: MMM; PERRL  Neck: Supple; no JVP elevation, neck swelling overlying the thyroid area, not clear of etiology   Respiratory: ctab, no wheezes/rales/rhonchi, no appreciated stridor sound  Cardiovascular: RRR; normal S1/S2, no murmurs, rubs gallops   Gastrointestinal: Soft; NTND; normoactive bowel sounds  Extremities: warm, well perfused, no edema/cyanosis  Neurological: AAOx3, no focal neural deficit  Skin: Clean and intact. Good skin turgor. Without open wounds and sores    MEDICATIONS:  MEDICATIONS  (STANDING):  albuterol/ipratropium for Nebulization 3 milliLiter(s) Nebulizer every 6 hours  cefTRIAXone   IVPB 2000 milliGRAM(s) IV Intermittent every 24 hours  dextrose 5% + sodium chloride 0.45%. 1000 milliLiter(s) (50 mL/Hr) IV Continuous <Continuous>  enalaprilat Injectable 1.25 milliGRAM(s) IV Push once  methylPREDNISolone sodium succinate Injectable 40 milliGRAM(s) IV Push every 12 hours    MEDICATIONS  (PRN):  HYDROmorphone  Injectable 0.5 milliGRAM(s) IV Push every 1 hour PRN Dyspnea, Increased work of breathing, anxiety      ALLERGIES:  Allergies    No Known Allergies    Intolerances        LABS:                        8.9    8.27  )-----------( 152      ( 21 Aug 2023 06:33 )             29.3     08-21    147<H>  |  112<H>  |  36<H>  ----------------------------<  134<H>  4.5   |  30  |  1.29    Ca    8.8      21 Aug 2023 06:33  Phos  3.0     08-21  Mg     2.6     08-21    TPro  6.4  /  Alb  3.3  /  TBili  0.3  /  DBili  x   /  AST  19  /  ALT  15  /  AlkPhos  47  08-21      Urinalysis Basic - ( 21 Aug 2023 06:33 )    Color: x / Appearance: x / SG: x / pH: x  Gluc: 134 mg/dL / Ketone: x  / Bili: x / Urobili: x   Blood: x / Protein: x / Nitrite: x   Leuk Esterase: x / RBC: x / WBC x   Sq Epi: x / Non Sq Epi: x / Bacteria: x      CAPILLARY BLOOD GLUCOSE          RADIOLOGY & ADDITIONAL TESTS: Reviewed.

## 2023-08-22 NOTE — PROGRESS NOTE ADULT - SUBJECTIVE AND OBJECTIVE BOX
Hematology Oncology Progress Note    Interval History:    SUBJECTIVE:   Patient stepped down from ICU.   Respiratory status improved with addition of solumedrol , though still with auditory stridor.   Currently not requiring oxygen.   He tells me that he would not want to be reintubated, though does want to still pursue biopsy     OBJECTIVE:    VITAL SIGNS:  ICU Vital Signs Last 24 Hrs  T(C): 37 (18 Aug 2023 11:20), Max: 37.1 (17 Aug 2023 22:22)  T(F): 98.6 (18 Aug 2023 11:20), Max: 98.8 (17 Aug 2023 22:22)  HR: 91 (18 Aug 2023 13:07) (62 - 102)  BP: 166/73 (18 Aug 2023 12:00) (95/54 - 172/75)  BP(mean): 105 (18 Aug 2023 12:00) (71 - 109)  ABP: --  ABP(mean): --  RR: 18 (18 Aug 2023 12:30) (14 - 22)  SpO2: 100% (18 Aug 2023 13:07) (93% - 100%)    O2 Parameters below as of 18 Aug 2023 12:30  Patient On (Oxygen Delivery Method): BiPAP/CPAP    O2 Concentration (%): 100      Mode: AC/ CMV (Assist Control/ Continuous Mandatory Ventilation), RR (machine): 20, TV (machine): 450, FiO2: 37, PEEP: 5, ITime: 1, MAP: 10, PIP: 21     @ 07:  -   @ 07:00  --------------------------------------------------------  IN: 1540.8 mL / OUT: 969 mL / NET: 571.8 mL     @ 07:  -   @ 13:56  --------------------------------------------------------  IN: 50 mL / OUT: 555 mL / NET: -505 mL      CAPILLARY BLOOD GLUCOSE          PHYSICAL EXAM:  General: conversive, in no acute distress, though with some minimal strido  Neck: supple  Respiratory: CTA b/l  Cardiovascular: +S1/S2; RRR  Abdomen: soft, NT/ND; +BS x4  Extremities: warm, well perfused, no edema/cyanosis  Skin: Clean and intact.   Neurological: AAOX3    MEDICATIONS:  MEDICATIONS  (STANDING):  acetaminophen   IVPB .. 1000 milliGRAM(s) IV Intermittent once  albuterol/ipratropium for Nebulization 3 milliLiter(s) Nebulizer every 6 hours  cefTRIAXone   IVPB 2000 milliGRAM(s) IV Intermittent every 24 hours  chlorhexidine 2% Cloths 1 Application(s) Topical <User Schedule>  heparin   Injectable 5000 Unit(s) SubCutaneous every 8 hours  methylPREDNISolone sodium succinate Injectable 40 milliGRAM(s) IV Push every 8 hours    MEDICATIONS  (PRN):      ALLERGIES:  Allergies    No Known Allergies    Intolerances        LABS:                        7.2    10.39 )-----------( 164      ( 18 Aug 2023 03:30 )             22.7     08-18    138  |  101  |  23  ----------------------------<  231<H>  3.5   |  24  |  1.81<H>    Ca    8.1<L>      18 Aug 2023 03:30  Phos  3.0     08-18  Mg     1.8     -18    TPro  6.1  /  Alb  3.1<L>  /  TBili  0.7  /  DBili  x   /  AST  16  /  ALT  10  /  AlkPhos  46  08-18    PT/INR - ( 18 Aug 2023 03:30 )   PT: 12.7 sec;   INR: 1.12          PTT - ( 18 Aug 2023 03:30 )  PTT:24.9 sec  Urinalysis Basic - ( 18 Aug 2023 03:33 )    Color: Yellow / Appearance: Clear / S.020 / pH: x  Gluc: x / Ketone: 15 mg/dL  / Bili: Negative / Urobili: 0.2 E.U./dL   Blood: x / Protein: NEGATIVE mg/dL / Nitrite: NEGATIVE   Leuk Esterase: NEGATIVE / RBC: x / WBC x   Sq Epi: x / Non Sq Epi: x / Bacteria: x            Culture - Blood (collected 23 @ 05:55)  Source: .Blood Blood-Venous  Preliminary Report (23 @ 07:00):    No growth at 1 day.    Culture - Blood (collected 23 @ 05:55)  Source: .Blood Blood-Venous  Preliminary Report (23 @ 07:00):    No growth at 1 day.            RADIOLOGY & ADDITIONAL TESTS: Reviewed.

## 2023-08-22 NOTE — CONSULT NOTE ADULT - SUBJECTIVE AND OBJECTIVE BOX
Patient is a 89y old  Male who presents with a chief complaint of Acute Hypoxic Respiratory Failure (22 Aug 2023 07:57)      HPI:  89 yo M PMH recently diagnosed esophageal cancer, aspirations and vocal cord paralysis who presented to the ED for 1 day of worsening dyspnea. Pt recently presented to [facility] on 08/08 for 3 months of hoarseness and difficulty swallowing, evaluated by ENT, found to have esophageal mass/cancer with R vocal cord paralysis. This AM pt presented to the ED for worsening shortness of breath and increasing lethargy/fatigue. On arrival pt was found to be hypoxic to 90% breathing at 27/min. BiPAP was initiated however pt became more lethargic w/ AMS and worsening CO2 retention. Pt was intubated and admitted to the MICU.     In the ED:  Initial vital signs: T: 98.3 HR: 101, BP: 191/98, R: 27, SpO2: 90% on RA  Labs: significant for WBC 6.2, Hgb 9.1, Trop 256 -> 194, Na 141, K 4.0, Cr 1.31, lactate 1.8, BNP 1780, VpH 7.29 -> 7.08 -> 7.17  Imaging: CTA chest: New, bibasilar patchy lung opacities since prior CT chest 8/8/2023, most suggestive of pneumonia. Stable upper thoracic esophageal mass and stable lymphadenopathy, likely metastatic. Negative for pulmonary embolus.  EKG: NSR, LBBB   Medications: CTX 2g,  Azithro 500, Mg 2g, Solumedrol 125mg, duonebs    In the MICU patient arrived intubated on propofol, in NAD, and was transitioned to zosyn.   Patient's family was called to let them know pt was in the hospital and to collect collateral on the patient. The patient's wife Shilpi noted that Mr. Frances  was scheduled to get a biopsy of his left supraclavicular node that was noted to be enlarged, biopsy was scheduled for tomorrow 8/18. Spoke with Mr. Frances's granddaughter as well who worked as a palliative care nurse, she stated that they had family discussions prior to this admission/event regarding Mr. Frances's goals of care and health plans moving forward. She noted that Mr. Frances believed he was "indio to be alive" and was open to continued discussions about how he wanted his life/health care plans to play out.  (17 Aug 2023 10:18)    PAST MEDICAL & SURGICAL HISTORY:  HTN (hypertension)      History of intracranial aneurysm      Esophageal mass        MEDICATIONS  (STANDING):  albuterol/ipratropium for Nebulization 3 milliLiter(s) Nebulizer every 6 hours  cefTRIAXone   IVPB 2000 milliGRAM(s) IV Intermittent every 24 hours  dextrose 5% + sodium chloride 0.45%. 1000 milliLiter(s) (50 mL/Hr) IV Continuous <Continuous>  enalaprilat Injectable 1.25 milliGRAM(s) IV Push once  methylPREDNISolone sodium succinate Injectable 40 milliGRAM(s) IV Push every 12 hours    MEDICATIONS  (PRN):  HYDROmorphone  Injectable 0.5 milliGRAM(s) IV Push every 1 hour PRN Dyspnea, Increased work of breathing, anxiety               Home Living Status :  lives with his spouse in an elevator accessible apartment building          -  Prior Home Care Services :  none     hrs x  days/week          -  Occupation : retired, worked for [employer]    Baseline Functional Level Prior to Admission :             - ADL's/ IADL's :  independent           - Ambulatory status prior to admission :   walked with no assistive devices          FAMILY HISTORY:      CBC Full  -  ( 21 Aug 2023 06:33 )  WBC Count : 8.27 K/uL  RBC Count : 2.98 M/uL  Hemoglobin : 8.9 g/dL  Hematocrit : 29.3 %  Platelet Count - Automated : 152 K/uL  Mean Cell Volume : 98.3 fl  Mean Cell Hemoglobin : 29.9 pg  Mean Cell Hemoglobin Concentration : 30.4 gm/dL  Auto Neutrophil # : 7.36 K/uL  Auto Lymphocyte # : 0.30 K/uL  Auto Monocyte # : 0.55 K/uL  Auto Eosinophil # : 0.00 K/uL  Auto Basophil # : 0.00 K/uL  Auto Neutrophil % : 89.0 %  Auto Lymphocyte % : 3.6 %  Auto Monocyte % : 6.7 %  Auto Eosinophil % : 0.0 %  Auto Basophil % : 0.0 %      08-21    147<H>  |  112<H>  |  36<H>  ----------------------------<  134<H>  4.5   |  30  |  1.29    Ca    8.8      21 Aug 2023 06:33  Phos  3.0     08-21  Mg     2.6     08-21    TPro  6.4  /  Alb  3.3  /  TBili  0.3  /  DBili  x   /  AST  19  /  ALT  15  /  AlkPhos  47  08-21      Urinalysis Basic - ( 21 Aug 2023 06:33 )    Color: x / Appearance: x / SG: x / pH: x  Gluc: 134 mg/dL / Ketone: x  / Bili: x / Urobili: x   Blood: x / Protein: x / Nitrite: x   Leuk Esterase: x / RBC: x / WBC x   Sq Epi: x / Non Sq Epi: x / Bacteria: x        Radiology :     < from: Xray Chest 1 View-PORTABLE IMMEDIATE (Xray Chest 1 View-PORTABLE IMMEDIATE .) (08.17.23 @ 07:20) >  ACC: [id number] EXAM:  XR CHEST PORTABLE IMMED 1V   ORDERED BY: RAFAELA MASCORRO     PROCEDURE DATE:  08/17/2023          INTERPRETATION:  Portable chest    HISTORY: Intubated shortness of breath    IMPRESSION:    Endotracheal tube in good position. Nasogastric tube courses off image   below hemidiaphragm. Small bibasilar focal atelectasis/infiltrates. Upper   lungs clear. No pneumothorax. Cannot exclude small pleural effusions.        < from: CT Angio Chest PE Protocol w/ IV Cont (08.17.23 @ 04:41) >  ACC: [id number] EXAM:  CT ANGIO CHEST PULM ART [facility]   ORDERED BY: RAFAELA MASCORRO     PROCEDURE DATE:  08/17/2023          INTERPRETATION:  CLINICAL INFORMATION:  SOB, r/o PE    COMPARISON: CT chest 8/8/2023.    CONTRAST/COMPLICATIONS:  IV Contrast: Isovue 370  70 cc administered   30 cc discarded  Oral Contrast: NONE  Complications: None reported at time of study completion      PROCEDURE:  Axial CT images were acquired through the chest during the arterial phase.  Intravenous contrast: 90 cc of Omnipaque-350 mg/ml were administered; 10   cc were discarded.  Oral contrast: None.  Two-dimensional and MIP reformats were generated.    FINDINGS:  CHEST:  LUNGS AND LARGE AIRWAYS: Emphysematous lung changes again seen. There are   new, bilateral patchy opacities at the lung bases, superimposed on   probable interstitial lung disease, suggestive of interval development of   pneumonia.  PLEURA: No pleural effusion or pneumothorax.  VESSELS: Negative for pulmonary embolus. Calcific atherosclerosis of the   thoracic aorta without dissection. Stable borderline aneurysmal   dilatation of the ascending aorta which measures 4 cm. The main pulmonary   arterial segment is dilated, stable.  HEART: Cardiomegaly, stable.. No pericardial effusion. Coronary artery   calcifications seen.  MEDIASTINUM AND ALEXIS: Once again, there is a stable intraluminal mass of   the upper esophagus with adjacent fat fatty soft tissue infiltration.   Stable 1.1 cm left upper paratracheal stable mildly enlarged bilateral   hilar lymph nodes. 1.4 cm short axis subcarinal lymph node.  CHEST WALL AND LOWER NECK: 3 cm left supraclavicular lymph node is   unchanged. Stable 1.2 cm right thyroid nodule.  VISUALIZED UPPER ABDOMEN: Small hepatic cysts and small liver   hypodensities too small to characterize. 1.2 cm right adrenal gland   nodule. Splenic capsular calcification again seen.  BONES: Degenerative changes.      IMPRESSION:  There are new, bibasilar patchy lung opacities since prior CT chest   8/8/2023, most suggestive of pneumonia.    Stable upper thoracic esophageal mass and stable lymphadenopathy, likely   metastatic.    Negative for pulmonary embolus.             Review of Systems : per HPI         Vital Signs Last 24 Hrs  T(C): 36.6 (22 Aug 2023 05:24), Max: 36.9 (21 Aug 2023 09:18)  T(F): 97.8 (22 Aug 2023 05:24), Max: 98.5 (21 Aug 2023 09:18)  HR: 80 (22 Aug 2023 05:24) (72 - 98)  BP: 174/82 (22 Aug 2023 05:24) (143/76 - 183/80)  BP(mean): 98 (21 Aug 2023 20:28) (98 - 98)  RR: 18 (22 Aug 2023 05:24) (16 - 20)  SpO2: 96% (22 Aug 2023 05:24) (93% - 100%)    Parameters below as of 21 Aug 2023 20:28  Patient On (Oxygen Delivery Method): room air            Physical Exam :   frail 89 y o man lying comfortably in semi Hernandez's position , awake , alert , no acute complaints , hoarse voice , feels tired     Head : normocephalic , atraumatic    Eyes : PERRLA , EOMI , no nystagmus , sclera anicteric    ENT / FACE : neg nasal discharge , uvula midline , no oropharyngeal erythema / exudate    Neck : supple , negative JVD , negative carotid bruits , no thyromegaly    Chest : CTA bilaterally , neg wheeze / rhonchi / rales / crackles / egophany    Cardiovascular : regular rate and rhythm , neg murmurs / rubs / gallops    Abdomen : soft , non distended , non tender to palpation in all 4 quadrants ,  normal bowel sounds     Extremities : WWP , neg cyanosis /clubbing / edema     Neurologic Exam :    Alert and oriented  x 3     Motor Exam:          Right UE:               no focal weakness ,  > 3+/5 throughout      Left UE:                 no focal weakness ,  > 3+/5 throughout        Right LE:    no focal weakness ,  > 3+/5 throughout        Left LE:    no focal weakness ,  > 3+/5 throughout         Sensation :         intact to light touch x 4 extremities     DTR :     biceps/brachioradialis : equal                      patella/ankle : equal        Gait :  not tested          PM&R Impression :          - deconditioned    - no focal weakness          Recommendations / Plan :      1) Physical / Occupational therapy focusing on therapeutic exercises , equipment evaluation , bed mobility/transfer out of bed evaluation , progressive ambulation with assistive devices prn .    2) Current disposition plan recommendation  :    pending Palliative Care recs

## 2023-08-22 NOTE — PROGRESS NOTE ADULT - ASSESSMENT
89 yo M PMH recently diagnosed esophageal cancer, aspirations, and vocal cord paralysis presented to the ED for 1 day of worsening dyspnea, found to be hypoxic to 90% w/ AMS and worsening CO2 retention, subsequently intubated and admitted to the MICU. Patient has inspiratory stridor s/p extubation 8/18 which may be due to progressive vocal cord compression vs. VCD vs post extubation edema. Started with Solumedrol 40mg BID. As per family wishes, DNR/DNI. Palliative care, heme/onc, IR, and GI involved regarding lymph node biopsy for diagnosis.

## 2023-08-22 NOTE — PROGRESS NOTE ADULT - PROBLEM SELECTOR PLAN 2
New diagnosis, oncology f/u outpt had described a differential of (primary esophogeal, thyroid, lymphoma) and was scheduled for a L supraclavicular node biopsy on 8/18. Oncology consulted, verbally let us know that he has never had the mass or enlarged lymph nodes biopsied. A biopsy would be needed to determine route of care (if solid cancer would lean radiation, if lymphoma would lean medical/chemo management). Core biopsy IR procedure order from 08/18 cancelled 2/2 respiratory distress, patient now s/p core needle biopsy on 08/22.  - palliative and heme/onc on-board, appreciate recs   - f/u cytopathology and surg. path reports  - ongoing [facility] discussions with family New diagnosis, oncology f/u outpt had described a differential of (primary esophogeal, thyroid, lymphoma) and was scheduled for a L supraclavicular node biopsy on 8/18. Oncology consulted, verbally let us know that he has never had the mass or enlarged lymph nodes biopsied. A biopsy would be needed to determine route of care (if solid cancer would lean radiation, if lymphoma would lean medical/chemo management). Core biopsy IR procedure order from 08/18 cancelled 2/2 respiratory distress, patient now s/p core needle biopsy on 08/22.  - palliative, GI, and heme/onc on-board, appreciate recs   - f/u cytopathology and surg. path reports  - ongoing [facility] discussions with family  - per GI recs, EGD tomorrow for better visualization of mass (NPO at midnight)

## 2023-08-22 NOTE — CONSULT NOTE ADULT - ATTENDING COMMENTS
See  H&P addendum
Patient seen, examined, and discussed with FRANTZ Barlow and Dr. Sánchez. Agree with above. 89M with a h/o hypertension, esophageal mass with lymphadenopathy concerning for malignancy, aspirations and vocal cord paralysis who presented to the ED for 1 day of worsening dyspnea. Patient recently presented to [facility] on 08/08 for 3 months of hoarseness and difficulty swallowing, evaluated by ENT, found to have esophageal mass with R vocal cord paralysis. Presented to the ED on 8/17 for worsening shortness of breath. Found to be hypoxic and altered, CT showing new, bibasilar patchy lung opacities since prior CT chest 8/8/2023, most suggestive of pneumonia. He was intubated and admitted to MICU for acute hypoxic respiratory failure, likely secondary to aspiration pneumonia. He is now s/p extubation and solumedrol with improvement of respiratory status. GI consulted for further evaluation and assessment of treatment options for dysphagia. Personally reviewed imaging and the upper esophageal mass is visualized but difficult to state if intraluminal for sure as opposed to external with compression. Nonetheless, patient does have intermittent dysphagia and coughs after ingesting liquids. NPO past MN for EGD tomorrow to assess intraluminal disease burden and consider options for intervening such as stent vs if a PEG is more reasonable in terms of nutritional support. Please hold off on esophagram tomorrow as this will hinder adequate endoscopic visualization.     Denis Martinez MD  Gastroenterology

## 2023-08-22 NOTE — PROGRESS NOTE ADULT - PROBLEM SELECTOR PLAN 3
Patient found to have b/l pneumonia, currently on ceftriaxone 2000mg daily til 8/25.  Continue care as per primary team.

## 2023-08-22 NOTE — PROGRESS NOTE ADULT - PROBLEM SELECTOR PLAN 1
Possibly 2/2 aspiration PNA vs. neck mass effect on trachea. CT pointing towards most likely bilateral aspiration PNA, but cannot r/o increased mass effect compromising airway.  - s/p extubation, now satting well on room air, weaned of NC successfully  - c/w ceftriaxone 2g IV q24  - c/w methylprednisolone 40mg IVP Q12

## 2023-08-22 NOTE — CONSULT NOTE ADULT - ASSESSMENT
{\rtf1\zcjsob28015\ansi\yunjmpz2904\ftnbj\uc1\deff0  {\fonttbl{\f0 \fnil Segoe UI;}{\f1 \fnil \fcharset0 Segoe UI;}{\f2 \fnil Times New [place];}}  {\colortbl ;\dlw219\tziyh432\cvuc693 ;\red0\green0\blue0 ;\red0\green0\rxyh140 ;\red0\green0\blue0 ;}  {\stylesheet{\f0\fs20 Normal;}{\cs1 Default Paragraph Font;}{\cs2\f0\fs16 Line Number;}{\cs3\f2\fs24\ul\cf3 Hyperlink;}}  {\*\revtbl{Unknown;}}  \mnfwst52434\owydmg88279\fxeco6100\qhyro8764\lfwal4320\rowty3408\aqldgjk111\matgsde470\nogrowautofit\koaals349\formshade\nofeaturethrottle1\dntblnsbdb\fet4\aendnotes\aftnnrlc\pgbrdrhead\pgbrdrfoot  \sectd\jfgser28717\neoklz05595\guttersxn0\pozsjphi6216\uljpgfve1757\frktegps3080\nwccuxwz2542\xbifxgq111\kmlyaqz997\sbkpage\pgncont\pgndec  \plain\plain\f0\fs24\ql\plain\f0\fs24\plain\f0\fs20\uewu5549\hich\f0\dbch\f0\loch\f0\fs20 I M\par  \par  89 y o M PMH recently diagnosed esophageal cancer, aspirations, and vocal cord paralysis presents to the ED for 1 day of worsening dyspnea found to be hypoxic to 90% w/ AMS and worsening CO2 retention, subsequently intubated and admitted to the MICU.   Patient has inspiratory stridor s/p extubation 8/18 which may be due to progressive vocal cord compression vs VCD vs post extubation edema. ENT evaluation and laryngoscopy pending. Started with Solumedrol 40mg TID. As per family wishes, DNR/DNI. Palliative   care, heme/onc, and IR involved regarding lymph node biopsy for diagnosis.  \par  {\*\bkmkstart bkcommentCR}{\*\bkmkend bkcommentCR}\par  \plain\f1\fs20\nwbu6996\hich\f1\dbch\f1\loch\f1\cf2\fs20\ul{\field{\*\fldinst HYPERLINK [id number] }{\fldrslt Problem/Plan - 1:}}\plain\f0\fs20\ntrt1287\hich\f0\dbch\f0\loch\f0\fs20\ql\par  \'b7  {\*\bkmkstart tq90558783887}{\*\bkmkend fj88222703847}Problem: {\*\bkmkstart ea14779153852}{\*\bkmkend ld62788440957}Acute respiratory failure with hypoxia. \par  \'b7  {\*\bkmkstart gs43082907389}{\*\bkmkend rm57756605858}Plan: {\*\bkmkstart iy77800494929}{\*\bkmkend ky53359457986}Possibly 2/2 aspiration PNA vs esophogeal mass effect on trachea.\par  CT pointing towards most likely bilateral aspiration PNA, but cannot r/o increased mass effect compromising airway.\par  - s/p extubation, now satting well on room air, weaned of NC successfully\par  - c/w ceftriaxone 2g IV q24\par  - C/W methylprednisolone 40mg IVP Q12.\par  \par  \plain\f1\fs20\naau9459\hich\f1\dbch\f1\loch\f1\cf2\fs20\ul{\field{\*\fldinst HYPERLINK [id number] }{\fldrslt Problem/Plan - 2:}}\plain\f0\fs20\bbry3870\hich\f0\dbch\f0\loch\f0\fs20\ql\par  \'b7  {\*\bkmkstart ct02275005444}{\*\bkmkend dr59979537148}Problem: {\*\bkmkstart as42154747160}{\*\bkmkend pf79311753106}Esophageal mass. \par  \'b7  {\*\bkmkstart od27619922561}{\*\bkmkend fs68758527846}Plan: {\*\bkmkstart jz67642049257}{\*\bkmkend rw55501873623}New diagnosis, oncology f/u outpt had described a differential of (primary esophogeal, thyroid, lymphoma) and was scheduled for a L   supraclavicular node biopsy on 8/18. Oncology consulted, verbally let us know that he has never had the mass or enlarged lymph nodes biopsied. A biopsy would be needed to determine route of care (if solid cancer would lean radiation, if lymphoma would   lean medical/chemo management). Family amenable to lymph node fine needle aspiration for diagnosis, however core biopsy needed per onc and family on board with this. \par  - palliative and heme/onc on-board, appreciate recs \par  - core biopsy IR procedure order from 08/18 cancelled 2/2 respiratory distress, re ordered today. \par  - ongoing GOC discussions with family.\par  \par  \plain\f1\fs20\uymx9632\hich\f1\dbch\f1\loch\f1\cf2\fs20\ul{\field{\*\fldinst HYPERLINK [id number] }{\fldrslt Problem/Plan - 3:}}\plain\f0\fs20\bpdf0912\hich\f0\dbch\f0\loch\f0\fs20\ql\par  \'b7  {\*\bkmkstart tj95394389866}{\*\bkmkend mn97215714139}Problem: {\*\bkmkstart bl64539855805}{\*\bkmkend lo81917729381}Esophageal dysphagia. \par  \'b7  {\*\bkmkstart ac24708453485}{\*\bkmkend zf09712003242}Plan: {\*\bkmkstart qy83053604229}{\*\bkmkend td05475834943}pt presented on 8/8 with complaints of difficulty swallowing, as a result of his esophageal mass. He was discharged on thick liquids.   He then presented again on this latest admission with B/L aspiration pneumonia. \par  \par  Plan\par  \par  - pt kept NPO per speech& swallow rec. No action can be currently taken regarding oral feeding. Risk of aspiration is high. \par  - NG tube risk of perforating esophagus\par  - No feeding tubes are recommended at this time\par  - currently kept on IV fluids.\plain\f1\fs20\tcrs0722\hich\f1\dbch\f1\loch\f1\cf2\fs20\strike\plain\f0\fs20\awjr1842\hich\f0\dbch\f0\loch\f0\fs20\par  \par  \plain\f1\fs20\twgl4516\hich\f1\dbch\f1\loch\f1\cf2\fs20\ul{\field{\*\fldinst HYPERLINK [id number] }{\fldrslt Problem/Plan - 4:}}\plain\f0\fs20\cqom0938\hich\f0\dbch\f0\loch\f0\fs20\ql\par  \'b7  {\*\bkmkstart ln30451925460}{\*\bkmkend pt85952574242}Problem: {\*\bkmkstart mq12819536976}{\*\bkmkend pv18651425438}Vocal cord paresis. \par  \'b7  {\*\bkmkstart bn23015432272}{\*\bkmkend se89039548387}Plan: {\*\bkmkstart am92583086853}{\*\bkmkend hn51374426273}ENT following\par  #Inspiratory stridor\par  Likely 2/2 progressive vocal cord compression vs VCD vs post extubation edema. Laryngoscope shows known right sided vocal paresis w/ possible left sided edema 2/2 intubation\par  - f/u ent recs \par  - solumedrol 40 mg BID\par  - Outpatient cord injections as tolerated \par  \par  Patient now DNR DNI.\par  \par  \plain\f1\fs20\kcrx6525\hich\f1\dbch\f1\loch\f1\cf2\fs20\ul{\field{\*\fldinst HYPERLINK [id number] }{\fldrslt Problem/Plan - 5:}}\plain\f0\fs20\mfoq2456\hich\f0\dbch\f0\loch\f0\fs20\ql\par  \'b7  {\*\bkmkstart ky46013326569}{\*\bkmkend vl55738966408}Problem: {\*\bkmkstart zs29438906812}{\*\bkmkend hx70420964343}HTN (hypertension). \par  \'b7  {\*\bkmkstart of74271227586}{\*\bkmkend mo82922667341}Plan: {\*\bkmkstart zi98460529445}{\*\bkmkend pn10395919503}on home atenolol, currently being held resume as appropriate. Pt BP today 183/80 \par  \par  - as pt cant tolerate PO , IV Enalapril 1.25 IVP once, re order if required.\par  \par  \plain\f1\fs20\mshu8886\hich\f1\dbch\f1\loch\f1\cf2\fs20\ul{\field{\*\fldinst HYPERLINK [id number] }{\fldrslt Problem/Plan - 6:}}\plain\f0\fs20\jmvc1520\hich\f0\dbch\f0\loch\f0\fs20\ql\par  \'b7  {\*\bkmkstart ob03328808520}{\*\bkmkend wd81392865981}Problem: {\*\bkmkstart yw19169695650}{\*\bkmkend wg62459530305}Anemia. \par  \'b7  {\*\bkmkstart mz50936474270}{\*\bkmkend ig11853991790}Plan: {\*\bkmkstart ej41453603933}{\*\bkmkend ve65517381945}pt with anemia on initial presentation to ED 8/8 and then again this visit. No signs of bleeding throughout hospiatization and appears   stable for now, no need for immediate concern. DDx includes MELISSA, AOCD, Latest 8.9 \par  \par  -active t&S.\par  \par  \plain\f1\fs20\dfor8185\hich\f1\dbch\f1\loch\f1\cf2\fs20\ul{\field{\*\fldinst HYPERLINK [id number] }{\fldrslt Problem/Plan - 7:}}\plain\f0\fs20\qgqe3841\hich\f0\dbch\f0\loch\f0\fs20\ql\par  \'b7  {\*\bkmkstart dn53661244146}{\*\bkmkend nt68841142546}Problem: {\*\bkmkstart qc63651381467}{\*\bkmkend az26584719544}BRETT (acute kidney injury). \par  \'b7  {\*\bkmkstart fe72501698394}{\*\bkmkend eg32474327168}Plan: {\*\bkmkstart aa40287169352}{\*\bkmkend kc90353104743}. Can't rule out transient intrinsic insult s/p CTA with contrast. However FeNa 0.1% suggesting pre renal etiology possibly 2/2 to   poor PO intake prior to admission. \par  - CTM BUN/Cr \par  - avoid nephrotoxic meds if able\par  - DC ching as not needed.\par  \par  \plain\f1\fs20\nurx9025\hich\f1\dbch\f1\loch\f1\cf2\fs20\ul{\field{\*\fldinst HYPERLINK [id number] }{\fldrslt Problem/Plan - 8:}}\plain\f0\fs20\xasr8522\hich\f0\dbch\f0\loch\f0\fs20\ql\par  \'b7  {\*\bkmkstart ai47575638397}{\*\bkmkend ni10773225949}Problem: {\*\bkmkstart bi84088658034}{\*\bkmkend xn04620331845}Nutrition, metabolism, and development symptoms. \par  \'b7  {\*\bkmkstart ot11860717186}{\*\bkmkend pc52330816155}Plan: {\*\bkmkstart nk83650570940}{\*\bkmkend sd15085166235}F: None \par  E: Replete as necessary K>4 Mg>2\par  N: f/u s and s recs \par  DVT Prophylaxis: heparin 5000\par  GI prophylaxis: None \par  CODE STATUS: dnr dni.\par  \par  }

## 2023-08-22 NOTE — PROGRESS NOTE ADULT - PROBLEM SELECTOR PLAN 4
Patient is known to Dr. Priest and patient wants to proceed with a biopsy to know what he has, but at the same time he wants to be comfortable at home.  Home hospice was discussed and patient and son are amenable to a referral. Appreciate Oncology involvement. Planning for home hospice after biopsy.

## 2023-08-22 NOTE — PHYSICAL THERAPY INITIAL EVALUATION ADULT - PERTINENT HX OF CURRENT PROBLEM, REHAB EVAL
89 yo M PMH recently diagnosed esophageal cancer, aspirations, and vocal cord paralysis presents to the ED for 1 day of worsening dyspnea found to be hypoxic to 90% w/ AMS and worsening CO2 retention, subsequently intubated and admitted to the MICU. Patient has inspiratory stridor s/p extubation 8/18 which may be due to progressive vocal cord compression vs VCD vs post extubation edema. ENT evaluation and laryngoscopy pending. Started with Solumedrol 40mg TID. As per family wishes, DNR/DNI. Palliative care, heme/onc, and IR involved regarding lymph node biopsy for diagnosis.

## 2023-08-22 NOTE — PROGRESS NOTE ADULT - PROBLEM SELECTOR PLAN 5
Never smoker On home atenolol, currently being held. SBP consistently 170s-180s    - Pt unable to tolerate PO, IV Enalapril 1.25 q6h started 08/22 for persistently elevated BPs

## 2023-08-22 NOTE — PROGRESS NOTE ADULT - PROBLEM SELECTOR PLAN 1
Patient appears comfortable at this time given his Solumedrol use. Can also recommend MSIR 5mg q3h PO PRN. As patient cannot go home with IV opioids.

## 2023-08-22 NOTE — CONSULT NOTE ADULT - ASSESSMENT
89 yo M PMH hypertension, esophageal mass with lymphadenopathy concerning for malignancy, aspirations and vocal cord paralysis who presented to the ED for 1 day of worsening dyspnea. Patient recently presented to [facility] on 08/08 for 3 months of hoarseness and difficulty swallowing, evaluated by ENT, found to have esophageal mass with R vocal cord paralysis. Presented to the ED on 8/17 for worsening shortness of breath. Found to be hypoxic and altered, CT showing new, bibasilar patchy lung opacities since prior CT chest 8/8/2023, most suggestive of pneumonia. He was intubated and admitted to MICU for acute hypoxic respiratory failure, likely secondary to aspiration pneumonia. He is now s/p extubation and solumedrol with improvement of respiratory status. GI consulted for further evaluation and assessment of treatment options for dysphagia.    CT neck showing a 3.9 cm proximal esophagus mass is most concerning for malignancy. Patient currently being followed by heme/onc. He is s/p IR guided biopsy of left supraclavicular lymph node today.    Patient is able to tolerate secretions. Currently on a full liquid diet with no complaints of dysphagia. Considering PEG vs esophageal stenting for long term nutritional support. Stenting may be feasible given the proximal position of the mass on CT. May go for upper endoscopy tomorrow to further assist in guiding treatment plan.     Recommendations:   89 yo M PMH hypertension, esophageal mass with lymphadenopathy concerning for malignancy, aspirations and vocal cord paralysis who presented to the ED for 1 day of worsening dyspnea. Patient recently presented to [facility] on 08/08 for 3 months of hoarseness and difficulty swallowing, evaluated by ENT, found to have esophageal mass with R vocal cord paralysis. Presented to the ED on 8/17 for worsening shortness of breath. Found to be hypoxic and altered, CT showing new, bibasilar patchy lung opacities since prior CT chest 8/8/2023, most suggestive of pneumonia. He was intubated and admitted to MICU for acute hypoxic respiratory failure, likely secondary to aspiration pneumonia. He is now s/p extubation and solumedrol with improvement of respiratory status. GI consulted for further evaluation and assessment of treatment options for dysphagia.    CT neck showing a 3.9 cm proximal esophagus mass is most concerning for malignancy. Patient currently being followed by heme/onc. He is s/p IR guided biopsy of left supraclavicular lymph node today.    Patient is able to tolerate secretions. Currently on a full liquid diet with no complaints of dysphagia. Considering PEG vs esophageal stenting for long term nutritional support. Stenting may not be feasible given the proximal position of the mass on CT. May go for upper endoscopy tomorrow to further assist in guiding treatment plan.     Recommendations:   89 yo M PMH hypertension, esophageal mass with lymphadenopathy concerning for malignancy, aspirations and vocal cord paralysis who presented to the ED for 1 day of worsening dyspnea. Patient recently presented to [facility] on 08/08 for 3 months of hoarseness and difficulty swallowing, evaluated by ENT, found to have esophageal mass with R vocal cord paralysis. Presented to the ED on 8/17 for worsening shortness of breath. Found to be hypoxic and altered, CT showing new, bibasilar patchy lung opacities since prior CT chest 8/8/2023, most suggestive of pneumonia. He was intubated and admitted to MICU for acute hypoxic respiratory failure, likely secondary to aspiration pneumonia. He is now s/p extubation and solumedrol with improvement of respiratory status. GI consulted for further evaluation and assessment of treatment options for dysphagia.    CT neck showing a 3.9 cm proximal esophagus mass is most concerning for malignancy. Patient currently being followed by heme/onc. He is s/p IR guided biopsy of left supraclavicular lymph node today.    Patient is able to tolerate secretions. Currently on a full liquid diet with no complaints of dysphagia. Occasional coughing after eating. Considering PEG vs esophageal stenting for long term nutritional support. Stenting may not be reasonable given the proximal position of the mass on CT. Will go for upper endoscopy tomorrow for better visualization of mass     Recommendations:  -NPO midnight  -Upper endoscopy tomorrow to better visualize the position of the mass. Depending on how proximal the mass is, stenting may not be reasonable  -Do not recommend esophagram tomorrow, as patient is going for upper endoscopy

## 2023-08-22 NOTE — PROGRESS NOTE ADULT - PROBLEM SELECTOR PLAN 6
pt with anemia on initial presentation to ED 8/8 and then again this visit. No signs of bleeding throughout hospitalization and appears stable for now, no need for immediate concern. DDx includes MELISSA, AOCD, Latest 8.9     -active T&S

## 2023-08-22 NOTE — CONSULT NOTE ADULT - SUBJECTIVE AND OBJECTIVE BOX
HPI:  89 yo M PMH hypertension, esophageal mass with lymphadenopathy concerning for malignancy, aspirations and vocal cord paralysis who presented to the ED for 1 day of worsening dyspnea. Patient recently presented to [facility] on 08/08 for 3 months of hoarseness and difficulty swallowing, evaluated by ENT, found to have esophageal mass with R vocal cord paralysis. Presented to the ED on 8/17 for worsening shortness of breath. Found to be hypoxic and altered, CT showing new, bibasilar patchy lung opacities since prior CT chest 8/8/2023, most suggestive of pneumonia. He was intubated and admitted to MICU for acute hypoxic respiratory failure, likely secondary to aspiration pneumonia. He is now s/p extubation and solumedrol with improvement of respiratory status. GI consulted for       In the ED:  Initial vital signs: T: 98.3 HR: 101, BP: 191/98, R: 27, SpO2: 90% on RA  Labs: significant for WBC 6.2, Hgb 9.1, Trop 256 -> 194, Na 141, K 4.0, Cr 1.31, lactate 1.8, BNP 1780, VpH 7.29 -> 7.08 -> 7.17  Imaging: CTA chest: New, bibasilar patchy lung opacities since prior CT chest 8/8/2023, most suggestive of pneumonia. Stable upper thoracic esophageal mass and stable lymphadenopathy, likely metastatic. Negative for pulmonary embolus.  EKG: NSR, LBBB   Medications: CTX 2g,  Azithro 500, Mg 2g, Solumedrol 125mg, duonebs    Allergies    No Known Allergies    Intolerances      Home Medications:  albuterol 90 mcg/inh inhalation aerosol: 2 inhaled (17 Aug 2023 10:29)  amitriptyline 10 mg oral tablet: 1 orally 2 times a day (17 Aug 2023 10:32)  atenolol 50 mg oral tablet: 1 orally 2 times a day (17 Aug 2023 10:40)  diazePAM 5 mg oral tablet: 1 orally once a day (at bedtime) (17 Aug 2023 10:31)  Symbicort 160 mcg-4.5 mcg/inh inhalation aerosol: 2 inhaled (17 Aug 2023 10:45)    MEDICATIONS:  MEDICATIONS  (STANDING):  albuterol/ipratropium for Nebulization 3 milliLiter(s) Nebulizer every 6 hours  cefTRIAXone   IVPB 2000 milliGRAM(s) IV Intermittent every 24 hours  dextrose 5% + sodium chloride 0.45%. 1000 milliLiter(s) (50 mL/Hr) IV Continuous <Continuous>  enalaprilat Injectable 1.25 milliGRAM(s) IV Push every 6 hours  methylPREDNISolone sodium succinate Injectable 40 milliGRAM(s) IV Push every 12 hours    MEDICATIONS  (PRN):  HYDROmorphone  Injectable 0.5 milliGRAM(s) IV Push every 1 hour PRN Dyspnea, Increased work of breathing, anxiety    PAST MEDICAL & SURGICAL HISTORY:  HTN (hypertension)      History of intracranial aneurysm      Esophageal mass        FAMILY HISTORY:    SOCIAL HISTORY:  [place]ac[place]: [ ] Current, [ ] Former, [ ] Never; Pack Years:  Alcohol:  Illicit Drugs:    REVIEW OF SYSTEMS:  All other 10 review of systems is negative unless indicated above.    Vital Signs Last 24 Hrs  T(C): 36.7 (22 Aug 2023 11:55), Max: 36.7 (21 Aug 2023 17:38)  T(F): 98 (22 Aug 2023 11:55), Max: 98.1 (21 Aug 2023 17:38)  HR: 80 (22 Aug 2023 12:26) (75 - 98)  BP: 171/74 (22 Aug 2023 12:26) (143/76 - 179/81)  BP(mean): 98 (21 Aug 2023 20:28) (98 - 98)  RR: 20 (22 Aug 2023 11:55) (17 - 20)  SpO2: 97% (22 Aug 2023 11:55) (93% - 97%)    Parameters below as of 22 Aug 2023 11:55  Patient On (Oxygen Delivery Method): room air        08-21 @ 07:01  -  08-22 @ 07:00  --------------------------------------------------------  IN: 0 mL / OUT: 150 mL / NET: -150 mL        PHYSICAL EXAM:    General: Well developed; well nourished; in no acute distress  Eyes: Anicteric sclerae, moist conjunctivae  HENT: Moist mucous membranes  Neck: Trachea midline, supple  Lungs: Normal respiratory effort and no intercostal retractions  Cardiovascular: RRR  Abdomen: Soft, non-tender non-distended; Normal bowel sounds; No rebound or guarding  Extremities: Normal range of motion, No clubbing, cyanosis or edema  Neurological: Alert and oriented x3  Skin: Warm and dry. No obvious rash    LABS:                        8.9    8.27  )-----------( 152      ( 21 Aug 2023 06:33 )             29.3     08-21    147<H>  |  112<H>  |  36<H>  ----------------------------<  134<H>  4.5   |  30  |  1.29    Ca    8.8      21 Aug 2023 06:33  Phos  3.0     08-21  Mg     2.6     08-21    TPro  6.4  /  Alb  3.3  /  TBili  0.3  /  DBili  x   /  AST  19  /  ALT  15  /  AlkPhos  47  08-21            RADIOLOGY & ADDITIONAL STUDIES:    HPI:  89 yo M PMH hypertension, esophageal mass with lymphadenopathy concerning for malignancy, aspirations and vocal cord paralysis who presented to the ED for 1 day of worsening dyspnea. Patient recently presented to [facility] on 08/08 for 3 months of hoarseness and difficulty swallowing, evaluated by ENT, found to have esophageal mass with R vocal cord paralysis. Presented to the ED on 8/17 for worsening shortness of breath. Found to be hypoxic and altered, CT showing new, bibasilar patchy lung opacities since prior CT chest 8/8/2023, most suggestive of pneumonia. He was intubated and admitted to MICU for acute hypoxic respiratory failure, likely secondary to aspiration pneumonia. He is now s/p extubation and solumedrol with improvement of respiratory status. GI consulted for further evaluation and assessment of treatment options for dysphagia.    Patient seen and examined at bedside this afternoon. He is doing well with no complaints. He has had no issues with dysphagia on current diet, consisting of liquids, jello, ice cream. Notes that prior to being admitted, solid food "went down the wrong pipe" due to dysphagia. Patient denies nausea, vomiting, abdominal pain, chest pain, shortness of breath.       In the ED:  Initial vital signs: T: 98.3 HR: 101, BP: 191/98, R: 27, SpO2: 90% on RA  Labs: significant for WBC 6.2, Hgb 9.1, Trop 256 -> 194, Na 141, K 4.0, Cr 1.31, lactate 1.8, BNP 1780, VpH 7.29 -> 7.08 -> 7.17  Imaging: CTA chest: New, bibasilar patchy lung opacities since prior CT chest 8/8/2023, most suggestive of pneumonia. Stable upper thoracic esophageal mass and stable lymphadenopathy, likely metastatic. Negative for pulmonary embolus.  EKG: NSR, LBBB   Medications: CTX 2g,  Azithro 500, Mg 2g, Solumedrol 125mg, duonebs    Allergies    No Known Allergies    Intolerances      Home Medications:  albuterol 90 mcg/inh inhalation aerosol: 2 inhaled (17 Aug 2023 10:29)  amitriptyline 10 mg oral tablet: 1 orally 2 times a day (17 Aug 2023 10:32)  atenolol 50 mg oral tablet: 1 orally 2 times a day (17 Aug 2023 10:40)  diazePAM 5 mg oral tablet: 1 orally once a day (at bedtime) (17 Aug 2023 10:31)  Symbicort 160 mcg-4.5 mcg/inh inhalation aerosol: 2 inhaled (17 Aug 2023 10:45)    MEDICATIONS:  MEDICATIONS  (STANDING):  albuterol/ipratropium for Nebulization 3 milliLiter(s) Nebulizer every 6 hours  cefTRIAXone   IVPB 2000 milliGRAM(s) IV Intermittent every 24 hours  dextrose 5% + sodium chloride 0.45%. 1000 milliLiter(s) (50 mL/Hr) IV Continuous <Continuous>  enalaprilat Injectable 1.25 milliGRAM(s) IV Push every 6 hours  methylPREDNISolone sodium succinate Injectable 40 milliGRAM(s) IV Push every 12 hours    MEDICATIONS  (PRN):  HYDROmorphone  Injectable 0.5 milliGRAM(s) IV Push every 1 hour PRN Dyspnea, Increased work of breathing, anxiety    PAST MEDICAL & SURGICAL HISTORY:  HTN (hypertension)      History of intracranial aneurysm      Esophageal mass        FAMILY HISTORY:    SOCIAL HISTORY:  [place]: [ ] Current, [ ] Former, [ ] Never; Pack Years:  Alcohol:  Illicit Drugs:    REVIEW OF SYSTEMS:  All other 10 review of systems is negative unless indicated above.    Vital Signs Last 24 Hrs  T(C): 36.7 (22 Aug 2023 11:55), Max: 36.7 (21 Aug 2023 17:38)  T(F): 98 (22 Aug 2023 11:55), Max: 98.1 (21 Aug 2023 17:38)  HR: 80 (22 Aug 2023 12:26) (75 - 98)  BP: 171/74 (22 Aug 2023 12:26) (143/76 - 179/81)  BP(mean): 98 (21 Aug 2023 20:28) (98 - 98)  RR: 20 (22 Aug 2023 11:55) (17 - 20)  SpO2: 97% (22 Aug 2023 11:55) (93% - 97%)    Parameters below as of 22 Aug 2023 11:55  Patient On (Oxygen Delivery Method): room air        08-21 @ 07:01  -  08-22 @ 07:00  --------------------------------------------------------  IN: 0 mL / OUT: 150 mL / NET: -150 mL        PHYSICAL EXAM:    General: Well developed; well nourished; in no acute distress  Eyes: Anicteric sclerae, moist conjunctivae  HENT: Moist mucous membranes  Neck: Trachea midline, supple  Lungs: Normal respiratory effort and no intercostal retractions  Abdomen: Soft, non-tender non-distended; No rebound or guarding  Extremities: Normal range of motion, No clubbing, cyanosis or edema  Neurological: Alert and oriented x3  Skin: Warm and dry. No obvious rash    LABS:                        8.9    8.27  )-----------( 152      ( 21 Aug 2023 06:33 )             29.3     08-21    147<H>  |  112<H>  |  36<H>  ----------------------------<  134<H>  4.5   |  30  |  1.29    Ca    8.8      21 Aug 2023 06:33  Phos  3.0     08-21  Mg     2.6     08-21    TPro  6.4  /  Alb  3.3  /  TBili  0.3  /  DBili  x   /  AST  19  /  ALT  15  /  AlkPhos  47  08-21            RADIOLOGY & ADDITIONAL STUDIES:    HPI:  89 yo M PMH hypertension, esophageal mass with lymphadenopathy concerning for malignancy, aspirations and vocal cord paralysis who presented to the ED for 1 day of worsening dyspnea. Patient recently presented to [facility] on 08/08 for 3 months of hoarseness and difficulty swallowing, evaluated by ENT, found to have esophageal mass with R vocal cord paralysis. Presented to the ED on 8/17 for worsening shortness of breath. Found to be hypoxic and altered, CT showing new, bibasilar patchy lung opacities since prior CT chest 8/8/2023, most suggestive of pneumonia. He was intubated and admitted to MICU for acute hypoxic respiratory failure, likely secondary to aspiration pneumonia. He is now s/p extubation and solumedrol with improvement of respiratory status. GI consulted for further evaluation and assessment of treatment options for dysphagia.    Patient seen and examined at bedside this afternoon. He is doing well with no complaints. He has had no issues with dysphagia on current diet, consisting of liquids, jello, ice cream. Does note occasionally coughing after eating. Notes that prior to being admitted, solid food "went down the wrong pipe" due to dysphagia. Patient denies nausea, vomiting, abdominal pain, chest pain, shortness of breath.       In the ED:  Initial vital signs: T: 98.3 HR: 101, BP: 191/98, R: 27, SpO2: 90% on RA  Labs: significant for WBC 6.2, Hgb 9.1, Trop 256 -> 194, Na 141, K 4.0, Cr 1.31, lactate 1.8, BNP 1780, VpH 7.29 -> 7.08 -> 7.17  Imaging: CTA chest: New, bibasilar patchy lung opacities since prior CT chest 8/8/2023, most suggestive of pneumonia. Stable upper thoracic esophageal mass and stable lymphadenopathy, likely metastatic. Negative for pulmonary embolus.  EKG: NSR, LBBB   Medications: CTX 2g,  Azithro 500, Mg 2g, Solumedrol 125mg, duonebs    Allergies    No Known Allergies    Intolerances      Home Medications:  albuterol 90 mcg/inh inhalation aerosol: 2 inhaled (17 Aug 2023 10:29)  amitriptyline 10 mg oral tablet: 1 orally 2 times a day (17 Aug 2023 10:32)  atenolol 50 mg oral tablet: 1 orally 2 times a day (17 Aug 2023 10:40)  diazePAM 5 mg oral tablet: 1 orally once a day (at bedtime) (17 Aug 2023 10:31)  Symbicort 160 mcg-4.5 mcg/inh inhalation aerosol: 2 inhaled (17 Aug 2023 10:45)    MEDICATIONS:  MEDICATIONS  (STANDING):  albuterol/ipratropium for Nebulization 3 milliLiter(s) Nebulizer every 6 hours  cefTRIAXone   IVPB 2000 milliGRAM(s) IV Intermittent every 24 hours  dextrose 5% + sodium chloride 0.45%. 1000 milliLiter(s) (50 mL/Hr) IV Continuous <Continuous>  enalaprilat Injectable 1.25 milliGRAM(s) IV Push every 6 hours  methylPREDNISolone sodium succinate Injectable 40 milliGRAM(s) IV Push every 12 hours    MEDICATIONS  (PRN):  HYDROmorphone  Injectable 0.5 milliGRAM(s) IV Push every 1 hour PRN Dyspnea, Increased work of breathing, anxiety    PAST MEDICAL & SURGICAL HISTORY:  HTN (hypertension)      History of intracranial aneurysm      Esophageal mass        FAMILY HISTORY:    SOCIAL HISTORY:  [place]ac[place]: [ ] Current, [ ] Former, [ ] Never; Pack Years:  Alcohol:  Illicit Drugs:    REVIEW OF SYSTEMS:  All other 10 review of systems is negative unless indicated above.    Vital Signs Last 24 Hrs  T(C): 36.7 (22 Aug 2023 11:55), Max: 36.7 (21 Aug 2023 17:38)  T(F): 98 (22 Aug 2023 11:55), Max: 98.1 (21 Aug 2023 17:38)  HR: 80 (22 Aug 2023 12:26) (75 - 98)  BP: 171/74 (22 Aug 2023 12:26) (143/76 - 179/81)  BP(mean): 98 (21 Aug 2023 20:28) (98 - 98)  RR: 20 (22 Aug 2023 11:55) (17 - 20)  SpO2: 97% (22 Aug 2023 11:55) (93% - 97%)    Parameters below as of 22 Aug 2023 11:55  Patient On (Oxygen Delivery Method): room air        08-21 @ 07:01  -  08-22 @ 07:00  --------------------------------------------------------  IN: 0 mL / OUT: 150 mL / NET: -150 mL        PHYSICAL EXAM:    General: Well developed; well nourished; in no acute distress  Eyes: Anicteric sclerae, moist conjunctivae  HENT: Moist mucous membranes  Neck: Trachea midline, supple  Lungs: Normal respiratory effort and no intercostal retractions  Abdomen: Soft, non-tender non-distended; No rebound or guarding  Extremities: Normal range of motion, No clubbing, cyanosis or edema  Neurological: Alert and oriented x3  Skin: Warm and dry. No obvious rash    LABS:                        8.9    8.27  )-----------( 152      ( 21 Aug 2023 06:33 )             29.3     08-21    147<H>  |  112<H>  |  36<H>  ----------------------------<  134<H>  4.5   |  30  |  1.29    Ca    8.8      21 Aug 2023 06:33  Phos  3.0     08-21  Mg     2.6     08-21    TPro  6.4  /  Alb  3.3  /  TBili  0.3  /  DBili  x   /  AST  19  /  ALT  15  /  AlkPhos  47  08-21            RADIOLOGY & ADDITIONAL STUDIES:

## 2023-08-22 NOTE — PROGRESS NOTE ADULT - SUBJECTIVE AND OBJECTIVE BOX
CARLOS CALDERA             MRN-[id number]    CC: Acute Hypoxic Respiratory Failure    HPI:  89 yo M PMH recently diagnosed esophageal cancer, aspirations and vocal cord paralysis who presented to the ED for 1 day of worsening dyspnea. Pt recently presented to [facility] on 08/08 for 3 months of hoarseness and difficulty swallowing, evaluated by ENT, found to have esophageal mass/cancer with R vocal cord paralysis. This AM pt presented to the ED for worsening shortness of breath and increasing lethargy/fatigue. On arrival pt was found to be hypoxic to 90% breathing at 27/min. BiPAP was initiated however pt became more lethargic w/ AMS and worsening CO2 retention. Pt was intubated and admitted to the MICU.     In the ED:  Initial vital signs: T: 98.3 HR: 101, BP: 191/98, R: 27, SpO2: 90% on RA  Labs: significant for WBC 6.2, Hgb 9.1, Trop 256 -> 194, Na 141, K 4.0, Cr 1.31, lactate 1.8, BNP 1780, VpH 7.29 -> 7.08 -> 7.17  Imaging: CTA chest: New, bibasilar patchy lung opacities since prior CT chest 8/8/2023, most suggestive of pneumonia. Stable upper thoracic esophageal mass and stable lymphadenopathy, likely metastatic. Negative for pulmonary embolus.  EKG: NSR, LBBB   Medications: CTX 2g,  Azithro 500, Mg 2g, Solumedrol 125mg, duonebs    In the MICU patient arrived intubated on propofol, in NAD, and was transitioned to zosyn.   Patient's family was called to let them know pt was in the hospital and to collect collateral on the patient. The patient's wife Shilpi noted that Mr. Caldera  was scheduled to get a biopsy of his left supraclavicular node that was noted to be enlarged, biopsy was scheduled for tomorrow 8/18. Spoke with Mr. Caldera's granddaughter as well who worked as a palliative care nurse, she stated that they had family discussions prior to this admission/event regarding Mr. Caldera's goals of care and health plans moving forward. She noted that Mr. Caldera believed he was "indio to be alive" and was open to continued discussions about how he wanted his life/health care plans to play out.  (17 Aug 2023 10:18)    SUBJECTIVE: Patient resting in bed in no distress.       ROS:  DYSPNEA (Linda): 0	  NAUS/VOM: N	  SECRETIONS: N  AGITATION: N  Pain (Y/N):  N     -Provocation/Palliation: n/a   -Quality/Quantity: n/a   -Radiating: n/a   -Severity: n/a   -Timing/Frequency: n/a   -Impact on ADLs: n/a     OTHER REVIEW OF SYSTEMS: + weakness   UNABLE TO OBTAIN  due to: n/a       PEx:  T(C): 36.7 (08-22-23 @ 08:51), Max: 36.9 (08-21-23 @ 11:00)  HR: 82 (08-22-23 @ 08:51) (72 - 98)  BP: 179/81 (08-22-23 @ 08:51) (143/76 - 183/74)  RR: 20 (08-22-23 @ 08:51) (17 - 20)  SpO2: 96% (08-22-23 @ 08:51) (93% - 98%)  Wt(kg): 78.9kg    GENERAL:  [x ]Alert  [ x]Oriented x  3 [ ]Lethargic   [ ]Cachexia [x ]Verbal  [ ]Non-Verbal  Behavioral:   [ ] Anxiety  [ ] Delirium [ ] Agitation [x ] Other - calm   HEENT:  [ ]Normal   [x ]Dry mouth   [ ]ET Tube  [ ]Oral lesions   PULMONARY:   [ x]Clear  [ ]Tachypnea  [ ]Audible excessive secretions     [ ]Rhonchi     [ ]Right [ ]Left [ ]Bilateral  [ ]Crackles        [ ]Right [ ]Left [ ]Bilateral  [ ]Wheezing     [ ]Right [ ]Left [ ]Bilateral  CARDIOVASCULAR:    [x ]Regular [ ]Irregular [ ]Tachy  [ ]Tree [ ]Murmur [ ]Other  GASTROINTESTINAL:  [ x]Soft  [ ]Distended   [ ]+BS  [x ]Non tender [ ]Tender  [ ]PEG [ ]OGT/NGT  [] flexiseal with output  Last BM:   GENITOURINARY:  [ ]Normal [ ] Incontinent   [ ]Oliguria/Anuria   [x ]Mulligan  MUSCULOSKELETAL:   [ ]Normal   [x ]Weakness  [ ]Bed/Wheelchair bound [ ]Edema  NEUROLOGIC:   [x ]No focal deficits  [ ] Cognitive impairment  [ ] Dysphagia [ ]Dysarthria [ ] Paresis [  ]Other   SKIN:   [ x]Normal   [ ]Pressure ulcer(s)  [ ]Rash     ALLERGIES: No Known Allergies      OPIATE NAÏVE (Y/N): Y    MEDICATIONS: REVIEWED  MEDICATIONS  (STANDING):  albuterol/ipratropium for Nebulization 3 milliLiter(s) Nebulizer every 6 hours  cefTRIAXone   IVPB 2000 milliGRAM(s) IV Intermittent every 24 hours  dextrose 5% + sodium chloride 0.45%. 1000 milliLiter(s) (50 mL/Hr) IV Continuous <Continuous>  enalaprilat Injectable 1.25 milliGRAM(s) IV Push every 6 hours  methylPREDNISolone sodium succinate Injectable 40 milliGRAM(s) IV Push every 12 hours    MEDICATIONS  (PRN):  HYDROmorphone  Injectable 0.5 milliGRAM(s) IV Push every 1 hour PRN Dyspnea, Increased work of breathing, anxiety    LABS: REVIEWED  CBC:                        8.9    8.27  )-----------( 152      ( 21 Aug 2023 06:33 )             29.3     CMP:    08-21    147<H>  |  112<H>  |  36<H>  ----------------------------<  134<H>  4.5   |  30  |  1.29    Ca    8.8      21 Aug 2023 06:33  Phos  3.0     08-21  Mg     2.6     08-21    TPro  6.4  /  Alb  3.3  /  TBili  0.3  /  DBili  x   /  AST  19  /  ALT  15  /  AlkPhos  47  08-21    IMAGING: REVIEWED    ADVANCED DIRECTIVES:            DNR DNI            MOLST    DECISION MAKER:  Patient is able to make decisions for himself   LEGAL SURROGATE:Wife/Caregiver:   Shilpi Caldera  Phone: [phone]    Granddaughter/Emergency Contact (lives in [place]): Nanette Goodsoninguez  Phone: [phone]    Son (lives in [place]): Carlos Caldera  Phone: [phone]    Brother (lives in [place]): Britton Caldera  Phone: [phone]    Granddaughter (lives in [place]): Agnieszka Manjarrezhaile  Phone: [phone]    PSYCHOSOCIAL-SPIRITUAL ASSESSMENT:       Reviewed       Care plan unchanged    GOALS OF CARE DISCUSSION       Palliative care info/counseling provided	           Family meeting       Advanced Directives addressed please see Advance Care Planning Note	           See previous Palliative Medicine Note       Documentation of GOC: DNR/DNI  	      AGENCY CHOICE DISCUSSED:     Home hospice         REFERRALS:	        Palliative Med        Hospice       PT/OT

## 2023-08-22 NOTE — CONSULT NOTE ADULT - ASSESSMENT
Assessment: 89 year male with PMH of HTN, intracranial aneurysm, esophageal mass with lymphadenopathy who presents with shortness of breath. Hematology/Oncology following for esophageal mass. Patient initially intubated due to acute hypoxic respiratory failure and encephalopathy, now s/p extubation and solumedrol with improvement of respiratory status. IR consulted for left supraclavicular lymph node biopsy. Case reviewed with Dr. Martinez, plan for procedure with local anesthesia.     Communicated with: Dr. Orozco primary team

## 2023-08-22 NOTE — CONSULT NOTE ADULT - SUBJECTIVE AND OBJECTIVE BOX
89 year male with PMH of HTN, intracranial aneurysm, esophageal mass with lymphadenopathy who presents with shortness of breath. Hematology/Oncology following for esophageal mass. Patient initially intubated due to acute hypoxic respiratory failure and encephalopathy, now s/p extubation and solumedrol with improvement of respiratory status. IR consulted for left supraclavicular lymph node biopsy.     Clinical History: PNEUMONIA; ACUTE RESPFAILURE    Yes    DNI    Handoff    MEWS Score    HTN (hypertension)    History of intracranial aneurysm    Esophageal mass    PNA (pneumonia)    Respiratory failure    Functional quadriplegia    Pneumonia    Esophageal cancer    Encounter for palliative care    Advance care planning    Acute respiratory failure with hypoxia    Esophageal cancer    Vocal cord paresis    HTN (hypertension)    Nutrition, metabolism, and development symptoms    Esophageal mass    Anemia    BRETT (acute kidney injury)    Esophageal dysphagia    SOB (shortness of breath)    Debility    SOB    8    Acute respiratory failure with hypercapnia    [id number]        Meds:albuterol/ipratropium for Nebulization 3 milliLiter(s) Nebulizer every 6 hours  cefTRIAXone   IVPB 2000 milliGRAM(s) IV Intermittent every 24 hours  dextrose 5% + sodium chloride 0.45%. 1000 milliLiter(s) IV Continuous <Continuous>  enalaprilat Injectable 1.25 milliGRAM(s) IV Push every 6 hours  HYDROmorphone  Injectable 0.5 milliGRAM(s) IV Push every 1 hour PRN  methylPREDNISolone sodium succinate Injectable 40 milliGRAM(s) IV Push every 12 hours      Allergies:No Known Allergies        Labs:                           8.9    8.27  )-----------( 152      ( 21 Aug 2023 06:33 )             29.3       08-21    147<H>  |  112<H>  |  36<H>  ----------------------------<  134<H>  4.5   |  30  |  1.29    Ca    8.8      21 Aug 2023 06:33  Phos  3.0     08-21  Mg     2.6     08-21    TPro  6.4  /  Alb  3.3  /  TBili  0.3  /  DBili  x   /  AST  19  /  ALT  15  /  AlkPhos  47  08-21          Imaging Findings: reviewed

## 2023-08-22 NOTE — PROGRESS NOTE ADULT - SUBJECTIVE AND OBJECTIVE BOX
OVERNIGHT EVENTS:    SUBJECTIVE / INTERVAL HPI: Patient seen and examined at bedside.     VITAL SIGNS:  Vital Signs Last 24 Hrs  T(C): 36.6 (22 Aug 2023 05:24), Max: 36.9 (21 Aug 2023 09:18)  T(F): 97.8 (22 Aug 2023 05:24), Max: 98.5 (21 Aug 2023 09:18)  HR: 80 (22 Aug 2023 05:24) (72 - 98)  BP: 174/82 (22 Aug 2023 05:24) (143/76 - 183/80)  BP(mean): 98 (21 Aug 2023 20:28) (98 - 98)  RR: 18 (22 Aug 2023 05:24) (16 - 20)  SpO2: 96% (22 Aug 2023 05:24) (93% - 100%)    Parameters below as of 21 Aug 2023 20:28  Patient On (Oxygen Delivery Method): room air      I&O's Summary    21 Aug 2023 07:01  -  22 Aug 2023 07:00  --------------------------------------------------------  IN: 0 mL / OUT: 150 mL / NET: -150 mL        PHYSICAL EXAM:    General: WDWN  HEENT: NC/AT; PERRL, anicteric sclera; MMM  Neck: supple  Cardiovascular: +S1/S2; RRR  Respiratory: CTA B/L; no W/R/R  Gastrointestinal: soft, NT/ND; +BSx4  Extremities: WWP; no edema, clubbing or cyanosis  Vascular: 2+ radial, DP/PT pulses B/L  Neurological: AAOx3; no focal deficits    MEDICATIONS:  MEDICATIONS  (STANDING):  albuterol/ipratropium for Nebulization 3 milliLiter(s) Nebulizer every 6 hours  cefTRIAXone   IVPB 2000 milliGRAM(s) IV Intermittent every 24 hours  dextrose 5% + sodium chloride 0.45%. 1000 milliLiter(s) (50 mL/Hr) IV Continuous <Continuous>  enalaprilat Injectable 1.25 milliGRAM(s) IV Push once  methylPREDNISolone sodium succinate Injectable 40 milliGRAM(s) IV Push every 12 hours    MEDICATIONS  (PRN):  HYDROmorphone  Injectable 0.5 milliGRAM(s) IV Push every 1 hour PRN Dyspnea, Increased work of breathing, anxiety      ALLERGIES:  Allergies    No Known Allergies    Intolerances        LABS:                        8.9    8.27  )-----------( 152      ( 21 Aug 2023 06:33 )             29.3     08-21    147<H>  |  112<H>  |  36<H>  ----------------------------<  134<H>  4.5   |  30  |  1.29    Ca    8.8      21 Aug 2023 06:33  Phos  3.0     08-21  Mg     2.6     08-21    TPro  6.4  /  Alb  3.3  /  TBili  0.3  /  DBili  x   /  AST  19  /  ALT  15  /  AlkPhos  47  08-21      Urinalysis Basic - ( 21 Aug 2023 06:33 )    Color: x / Appearance: x / SG: x / pH: x  Gluc: 134 mg/dL / Ketone: x  / Bili: x / Urobili: x   Blood: x / Protein: x / Nitrite: x   Leuk Esterase: x / RBC: x / WBC x   Sq Epi: x / Non Sq Epi: x / Bacteria: x      CAPILLARY BLOOD GLUCOSE          RADIOLOGY & ADDITIONAL TESTS: Reviewed.   OVERNIGHT EVENTS: Started enalaprilat q6h this AM for HTN. No other AOE.    SUBJECTIVE / INTERVAL HPI: Patient seen and examined at bedside. Denies chest pain, shortness of breath. States that he had a BM and was able to urinate last night. No acute concerns    VITAL SIGNS:  Vital Signs Last 24 Hrs  T(C): 36.6 (22 Aug 2023 05:24), Max: 36.9 (21 Aug 2023 09:18)  T(F): 97.8 (22 Aug 2023 05:24), Max: 98.5 (21 Aug 2023 09:18)  HR: 80 (22 Aug 2023 05:24) (72 - 98)  BP: 174/82 (22 Aug 2023 05:24) (143/76 - 183/80)  BP(mean): 98 (21 Aug 2023 20:28) (98 - 98)  RR: 18 (22 Aug 2023 05:24) (16 - 20)  SpO2: 96% (22 Aug 2023 05:24) (93% - 100%)    Parameters below as of 21 Aug 2023 20:28  Patient On (Oxygen Delivery Method): room air      I&O's Summary    21 Aug 2023 07:01  -  22 Aug 2023 07:00  --------------------------------------------------------  IN: 0 mL / OUT: 150 mL / NET: -150 mL        PHYSICAL EXAM:    General: Well-appearing, NAD. Hoarse voice  HEENT: NC/AT; PERRL, anicteric sclera; MMM  Neck: Large frontal neck mass  Cardiovascular: +S1/S2; RRR  Respiratory: Mild inspiratory stridor. CTA B/L; no W/R/R  Gastrointestinal: soft, NT/ND; +BSx4  Extremities: WWP; no edema, clubbing or cyanosis  Vascular: 2+ radial, DP/PT pulses B/L  Neurological: AAOx3; no focal deficits    MEDICATIONS:  MEDICATIONS  (STANDING):  albuterol/ipratropium for Nebulization 3 milliLiter(s) Nebulizer every 6 hours  cefTRIAXone   IVPB 2000 milliGRAM(s) IV Intermittent every 24 hours  dextrose 5% + sodium chloride 0.45%. 1000 milliLiter(s) (50 mL/Hr) IV Continuous <Continuous>  enalaprilat Injectable 1.25 milliGRAM(s) IV Push once  methylPREDNISolone sodium succinate Injectable 40 milliGRAM(s) IV Push every 12 hours    MEDICATIONS  (PRN):  HYDROmorphone  Injectable 0.5 milliGRAM(s) IV Push every 1 hour PRN Dyspnea, Increased work of breathing, anxiety      ALLERGIES:  Allergies    No Known Allergies    Intolerances        LABS:                        8.9    8.27  )-----------( 152      ( 21 Aug 2023 06:33 )             29.3     08-21    147<H>  |  112<H>  |  36<H>  ----------------------------<  134<H>  4.5   |  30  |  1.29    Ca    8.8      21 Aug 2023 06:33  Phos  3.0     08-21  Mg     2.6     08-21    TPro  6.4  /  Alb  3.3  /  TBili  0.3  /  DBili  x   /  AST  19  /  ALT  15  /  AlkPhos  47  08-21      Urinalysis Basic - ( 21 Aug 2023 06:33 )    Color: x / Appearance: x / SG: x / pH: x  Gluc: 134 mg/dL / Ketone: x  / Bili: x / Urobili: x   Blood: x / Protein: x / Nitrite: x   Leuk Esterase: x / RBC: x / WBC x   Sq Epi: x / Non Sq Epi: x / Bacteria: x      CAPILLARY BLOOD GLUCOSE          RADIOLOGY & ADDITIONAL TESTS: Reviewed.

## 2023-08-22 NOTE — PHYSICAL THERAPY INITIAL EVALUATION ADULT - ADDITIONAL COMMENTS
Prior to admission pt lives in an elevator building with family. Family at bedside reports that someone will be with pt 24/7 but they are also looking into HHA services. Pt reports having shower chair and commode. Prior to admission pt was Indep with ADLs and mobility without AD.

## 2023-08-22 NOTE — PROGRESS NOTE ADULT - PROBLEM SELECTOR PLAN 4
ENT following  #Inspiratory stridor  Likely 2/2 progressive vocal cord compression vs VCD vs post extubation edema. Laryngoscope shows known right sided vocal paresis w/ possible left sided edema 2/2 intubation  - f/u ent recs   - solumedrol 40 mg BID  - Outpatient cord injections as tolerated

## 2023-08-22 NOTE — PROGRESS NOTE ADULT - PROBLEM SELECTOR PLAN 3
Pt presented on 8/8 with complaints of difficulty swallowing, as a result of his neck mass. He was discharged on thick liquids. He then presented again on this latest admission with B/L aspiration pneumonia. S&S recommended NPO diet. Patient tolerating liquids and now on full liquid diet.    Plan    - Pt kept NPO per speech & swallow rec initially, now on full liquid diet  - currently kept on IV fluids (D5W + LR @ 60cc/hr) Pt presented on 8/8 with complaints of difficulty swallowing, as a result of his neck mass. He was discharged on thick liquids. He then presented again on this latest admission with B/L aspiration pneumonia. S&S recommended NPO diet. Patient tolerating liquids and now on full liquid diet.    Plan    - Pt kept NPO per speech & swallow rec initially, currently on full liquid diet  - currently kept on IV fluids (D5W + LR @ 60cc/hr)  - per GI recs, EGD tomorrow for better visualization of mass (NPO at midnight)

## 2023-08-22 NOTE — PROGRESS NOTE ADULT - ASSESSMENT
89 year male with PMH of HTN, intracranial aneurysm, esophageal mass with lymphadenopathy who presents with shortness of breath. Hematology/Oncology consulted for esophageal mass. Patient initially intubated due to acute hypoxic respiratory failure and encephalopathy, now s/p extubation and solumedrol with improvement of respiratory status     #Esophageal mass c/f malignnacy  #Bilateral Pneumonia  - noted on CT scan 8/8/23 with supraclavicular, left paratracheal (with invasion into thyroid gland), retrocrural, and hilar lymphadenopathy. Nonspecific right adrenal nodule and 1.4 cm hypodense lesion in right kidney also noted  - seen by Dr. Priest 8/15/23 and had planned for IR biopsy 8/18/23   - concerning for malignancy, differential includes esophageal neoplasm vs lymphoma vs thyroid malignancy, though ultimately would need biopsy to determine treatment pathway.   - plan still for core biopsy as treatment options would differ depending on diagnosis and CT A/P to complete staging as clinical status improves   - appreciate palliative care assistance with goals of care - currently undergoing referrals for home hospice  - per discussion with patient, he will not want to be reintubated, though does want to know biopsy results to discuss further treatment plans  - antibiotics per primary for PNA treatment    To be discussed with Dr. Teixeira

## 2023-08-22 NOTE — PROGRESS NOTE ADULT - PROBLEM SELECTOR PLAN 5
Patient confirmed his wishes to be kept comfortable and to remain a DNR/DNI. He does not want nay more invasive procedures likes a feeding tube because he does not want to prolong his suffering. Plan for biopsy and home hospice.  Spoke to patient granddaughter Nanette over the phone and they are understanding that he cannot go home without help and they are gathering all the information regarding hiring supplemental help. Emotional support was provided.   - Zoroastrian/Spiritual practice: Unknown   - Coping: [x ] well [ ] with difficulty [ ] poor coping    - Support system: [ x] strong [ ] adequate [ ] inadequate  - All questions answered, emotional support provided  -  primary team   - Please contact Palliative Medicine 24/7 at [phone] for any acute symptoms or further questions  - Will continue to follow with you.

## 2023-08-22 NOTE — PROGRESS NOTE ADULT - PROBLEM SELECTOR PLAN 7
Can't rule out transient intrinsic insult s/p CTA with contrast. However FeNa 0.1% suggesting pre renal etiology possibly 2/2 to poor PO intake prior to admission. Cr normalized on 08/22 to 1.29.    - CTM BUN/Cr   - avoid nephrotoxic meds if able

## 2023-08-23 NOTE — PROGRESS NOTE ADULT - PROBLEM SELECTOR PLAN 2
New diagnosis, oncology f/u outpt had described a differential of (primary esophogeal, thyroid, lymphoma) and was scheduled for a L supraclavicular node biopsy on 8/18. Oncology consulted, verbally let us know that he has never had the mass or enlarged lymph nodes biopsied. A biopsy would be needed to determine route of care (if solid cancer would lean radiation, if lymphoma would lean medical/chemo management). Core biopsy IR procedure order from 08/18 cancelled 2/2 respiratory distress, patient now s/p core needle biopsy on 08/22.  - palliative, GI, and heme/onc on-board, appreciate recs   - f/u cytopathology and surg. path reports  - ongoing GOC discussions with family  - per GI recs, EGD tomorrow for better visualization of mass (NPO at midnight) Pt presented on 8/8 with complaints of difficulty swallowing, as a result of his neck mass. He was discharged on thick liquids. He then presented again on this latest admission with B/L aspiration pneumonia. S&S recommended NPO diet.     Plan  - Pt kept NPO per speech & swallow rec   - If advancing to full liquid diet, will need to be comfort feeds given risk of aspiration  - currently kept on IV fluids (D5W + LR @ 60cc/hr)

## 2023-08-23 NOTE — PROGRESS NOTE ADULT - ATTENDING COMMENTS
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 Today final day of abx.   -Sepsis, respiratory failure, laryngeal edema all improving. Titrate O2 goal >92%. c/w steroids a/p ENT.   -On enalaprilat for htn. Once PO improves will switch back to orals.   -BRETT improving. IVF for hypernatremia. Will increase PO as his oral intake hopefully improves.   -Heme/onc following. s/p lymph node biopsy., patho showing SCC. EGD today no obvious mass, possibly tumor shrank from steroid use? Consulted rad onc who are considering XRT for palliative purposes which patient wishes to purshe.   -continue to monitor. Transfuse >7  -Lengthy discussion with patient and family members today. Described how pathology shows SCC, still esophagus as likely primary. He may be a candidate for chemo/radiation. EGD today showing patent esophagus, no obvious mass, no stenting. Will ask speech therapy to see but regardless he is still likely at risk for aspiration. PEG tube not within his GOC. Has been eating despite NPO order so will give trials of liquids w/ patient knowing that he is high risk of aspiration/resp failure.

## 2023-08-23 NOTE — PROGRESS NOTE ADULT - ASSESSMENT
89 year male with PMH of HTN, intracranial aneurysm, esophageal mass with lymphadenopathy who presents with shortness of breath. Hematology/Oncology consulted for esophageal mass. Patient initially intubated due to acute hypoxic respiratory failure and encephalopathy, now s/p extubation and solumedrol with improvement of respiratory status. 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 Based on EGD findings, it appears esophageal mass and/or inflammatory effects from mass have improved with steroids. We discussed that treatment options at this time would be palliative intent rather than curative, and that patient will follow up with Dr. Priest for further treatment recommendations. Palliative radiation therapy will be discussed by radiation oncology team. At this time, patient and family agreed that they would not want to pursue a PEG tube at this time.     - supraclavicular lymph node biopsy 8/22/23 pathology consistent with metastatic squamous cell carcinoma - noted on CT scan 8/8/23 with supraclavicular, left paratracheal (with invasion into thyroid gland), retrocrural, and hilar lymphadenopathy. Nonspecific right adrenal nodule and 1.4 cm hypodense lesion in right kidney also noted  - would complete workup with CT A/P (oral contrast preferred if patient is able to tolerate)  - appreciate palliative care assistance with goals of care - currently undergoing referrals for home hospice. Patient does not want PEG tube at this time  - antibiotics per primary for PNA treatment  - follow up radiation oncology recommendations  - patient will follow up with Dr. Priest - appointment currently made for 8/29/23    To be discussed with Dr. Teixeira

## 2023-08-23 NOTE — DISCHARGE NOTE PROVIDER - PROVIDER TOKENS
PROVIDER:[TOKEN:[[id number]],SCHEDULEDAPPT:[08/29/2023]],PROVIDER:[TOKEN:[[id number]],SCHEDULEDAPPT:[09/08/2023],SCHEDULEDAPPTTIME:[11:00 AM]]

## 2023-08-23 NOTE — CHART NOTE - NSCHARTNOTEFT_GEN_A_CORE
Admitting Diagnosis:   Patient is a 89y old  Male who presents with a chief complaint of Acute Hypoxic Respiratory Failure (23 Aug 2023 10:54)    PAST MEDICAL & SURGICAL HISTORY:  HTN (hypertension)  History of intracranial aneurysm  Esophageal mass    Current Nutrition Order:  NPO after Midnight: 22-Aug-2023, 23:59   Full Liquid     PO Intake: Good (%) [   ]  Fair (50-75%) [   ] Poor (<25%) [   ] - N/A NPO    GI Issues:   Pt denies nausea/vomiting/diarrhea/constipation   Reports last BM today   No abdominal distension/discomfort noted     Pain:   No pain reported     Skin Integrity:  No pressure injuries or edema documented, Jerry score 15    Labs:       142  |  107  |  30<H>  ----------------------------<  152<H>  4.2   |  28  |  1.21    Ca    8.9      23 Aug 2023 05:30  Phos  2.6       Mg     2.5         Medications:  MEDICATIONS  (STANDING):  albuterol/ipratropium for Nebulization 3 milliLiter(s) Nebulizer every 6 hours  cefTRIAXone   IVPB 2000 milliGRAM(s) IV Intermittent every 24 hours  dextrose 5% + lactated ringers. 1000 milliLiter(s) (60 mL/Hr) IV Continuous <Continuous>  enalaprilat Injectable 2.5 milliGRAM(s) IV Push every 6 hours  methylPREDNISolone sodium succinate Injectable 40 milliGRAM(s) IV Push every 12 hours    MEDICATIONS  (PRN):  HYDROmorphone  Injectable 0.5 milliGRAM(s) IV Push every 1 hour PRN Dyspnea, Increased work of breathing, anxiety    Anthropometrics:  Height: 5'6"  Weight: 174lb/78.9kg  BMI: 28.1    IBW: 142lb/64.5kg    123% IBW    Weight Change:   No new weights obtained since admission. Recommend nursing to trend weights weekly. RD to continue monitoring weights as able.     Nutrition Focused Physical Exam: Completed [ x ]    Muscle Wasting- Mild Temporal [ x ] No Clavicle/Pectoral [ x ] Mild Interosseous [ x ] Mild Calf [ x ]  Fat Wasting- Mild Buccal [ x ]  No Triceps [ x ]      Estimated energy needs:   Calories: 25-30kcal/k-1932kcal/d  Protein: 1.2-1.4g/k-90g/d  Fluid: 30-35mL/k-2254mL/d  Estimated needs based on IBW as ABW >120% IBW. Needs adjusted for age, malignancy, and malnutrition.    Subjective:   89 yo M PMH recently diagnosed esophageal cancer, aspirations, and vocal cord paralysis presented to the ED for 1 day of worsening dyspnea, found to be hypoxic to 90% w/ AMS and worsening CO2 retention, subsequently intubated and admitted to the MICU. Patient has inspiratory stridor s/p extubation  which may be due to progressive vocal cord compression vs. VCD vs post extubation edema. Started with Solumedrol 40mg BID. As per family wishes, DNR/DNI. Palliative care, heme/onc, IR, and GI involved regarding lymph node biopsy for diagnosis. Status post biopsy of left supraclavicular lymph node . status post EGD , no esophageal mass visualized. Now pending SLP eval, discuss GOC further.    On assessment, pt resting in bed. Labs and medication orders reviewed. Ordered for solumedrol. BUN/Cr 30 <H>/1.21 WNL, electrolytes WNL. NPO for procedure, ordered for full liquid diet. Most recent swallow eval , SLP recommends NPO pending GOC and treatment planning. Now pending swallow re-eval status post EGD. Note pt has been NPO >5 days since admission . Nutrition-focused physical examination findings significant for mild wasting with consideration for age. Per ASPEN guidelines, pt meets criteria for severe acute malnutrition - see nutrition risk notification. Pt denies GI distress. Endorses strong appetite. See nutrition recommendations. RD to follow-up per protocol.     Previous Nutrition Diagnosis:  Inadequate Energy Intake related to current NPO status as evidenced by 0% EER being met at this time.     Active [   ]  Resolved [   ] - advanced , see below    New PES:   Severe acute malnutrition related to inadequate intake in setting of increased needs as evidenced by intake <50% needs >5 days and mild wasting.     Goal:  Consistently meet >75% nutrient needs. Reduce signs and symptoms of malnutrition.     Recommendations:  1. Recommend continue NPO pending swallow evaluation.   >>If diet advanced, recommend liberalized diet with Ensure High Protein (350kcal, 20g protein) x2/day. Defer consistency to team/SLP.   >>>Encourage & monitor PO intake. [place] dietary preferences as able.   >>If unable to advance diet and within GOC, recommend initiate nutrition support and consult nutrition services for recommendations.   2. Monitor GI tolerance, weight trends, labs, & skin integrity.  3. Defer bowel and pain regimens to team.   4. RD to remain available for diet education/intervention prn.     Education:   Education deferred in setting of NPO. Pt aware RD remains available for questions/concerns.     Risk Level: High [ x ] Moderate [   ] Low [   ] Admitting Diagnosis:   Patient is a 89y old  Male who presents with a chief complaint of Acute Hypoxic Respiratory Failure (23 Aug 2023 10:54)    PAST MEDICAL & SURGICAL HISTORY:  HTN (hypertension)  History of intracranial aneurysm  Esophageal mass    Current Nutrition Order:  NPO after Midnight: 22-Aug-2023, 23:59   Full Liquid     PO Intake: Good (%) [   ]  Fair (50-75%) [   ] Poor (<25%) [   ] - N/A NPO    GI Issues:   Pt denies nausea/vomiting/diarrhea/constipation   Reports last BM today   No abdominal distension/discomfort noted     Pain:   No pain reported     Skin Integrity:  No pressure injuries or edema documented, Jerry score 15    Labs:       142  |  107  |  30<H>  ----------------------------<  152<H>  4.2   |  28  |  1.21    Ca    8.9      23 Aug 2023 05:30  Phos  2.6       Mg     2.5         Medications:  MEDICATIONS  (STANDING):  albuterol/ipratropium for Nebulization 3 milliLiter(s) Nebulizer every 6 hours  cefTRIAXone   IVPB 2000 milliGRAM(s) IV Intermittent every 24 hours  dextrose 5% + lactated ringers. 1000 milliLiter(s) (60 mL/Hr) IV Continuous <Continuous>  enalaprilat Injectable 2.5 milliGRAM(s) IV Push every 6 hours  methylPREDNISolone sodium succinate Injectable 40 milliGRAM(s) IV Push every 12 hours    MEDICATIONS  (PRN):  HYDROmorphone  Injectable 0.5 milliGRAM(s) IV Push every 1 hour PRN Dyspnea, Increased work of breathing, anxiety    Anthropometrics:  Height: 5'6"  Weight: 174lb/78.9kg  BMI: 28.1    IBW: 142lb/64.5kg    123% IBW    Weight Change:   No new weights obtained since admission. Recommend nursing to trend weights weekly. RD to continue monitoring weights as able.     Nutrition Focused Physical Exam: Completed [ x ]    Muscle Wasting- Mild Temporal [ x ] No Clavicle/Pectoral [ x ] Mild Interosseous [ x ] Mild Calf [ x ]  Fat Wasting- Mild Buccal [ x ]  No Triceps [ x ]      Estimated energy needs:   Calories: 25-30kcal/k-1932kcal/d  Protein: 1.2-1.4g/k-90g/d  Fluid: 30-35mL/k-2254mL/d  Estimated needs based on IBW as ABW >120% IBW. Needs adjusted for age, malignancy, and malnutrition.    Subjective:   89 yo M PMH recently diagnosed esophageal cancer, aspirations, and vocal cord paralysis presented to the ED for 1 day of worsening dyspnea, found to be hypoxic to 90% w/ AMS and worsening CO2 retention, subsequently intubated and admitted to the MICU. Patient has inspiratory stridor s/p extubation  which may be due to progressive vocal cord compression vs. VCD vs post extubation edema. Started with Solumedrol 40mg BID. As per family wishes, DNR/DNI. Palliative care, heme/onc, IR, and GI involved regarding lymph node biopsy for diagnosis. Status post biopsy of left supraclavicular lymph node . status post EGD , no esophageal mass visualized. Now pending SLP eval, discuss GOC further.    On assessment, pt resting in bed. Labs and medication orders reviewed. Ordered for solumedrol. BUN/Cr 30 <H>/1.21 WNL, electrolytes WNL. NPO for procedure, ordered for full liquid diet. Most recent swallow eval , SLP recommends NPO pending GOC and treatment planning. Now pending swallow re-eval status post EGD. Note pt has been NPO >5 days since admission . Nutrition-focused physical examination findings significant for mild wasting with consideration for age. Per ASPEN guidelines, pt meets criteria for severe acute malnutrition - see nutrition risk notification. Pt denies GI distress. Endorses strong appetite. See nutrition recommendations. RD to follow-up per protocol.     Previous Nutrition Diagnosis:  Inadequate Energy Intake related to current NPO status as evidenced by 0% EER being met at this time.     Active [   ]  Resolved [   ] - advanced , see below    New PES:   Severe acute malnutrition related to inadequate intake in setting of increased needs as evidenced by intake <50% needs >5 days and mild wasting.     Goal:  Consistently meet >75% nutrient needs. Reduce signs and symptoms of malnutrition.     Recommendations:  1. Recommend continue NPO pending swallow evaluation.   >>If diet advanced, recommend liberalized diet with Ensure High Protein (350kcal, 20g protein) x2/day. Defer consistency to team/SLP.   >>>Encourage & monitor PO intake. [place] dietary preferences as able.   >>If unable to advance diet and within GOC, recommend initiate nutrition support and consult nutrition services for recommendations.   2. Recommend add multivitamin pending no medical contraindications.   3. Monitor GI tolerance, weight trends, labs, & skin integrity.  4. Defer bowel and pain regimens to team.   5. RD to remain available for diet education/intervention prn.     Education:   Education deferred in setting of NPO. Pt aware RD remains available for questions/concerns.     Risk Level: High [ x ] Moderate [   ] Low [   ]

## 2023-08-23 NOTE — DISCHARGE NOTE PROVIDER - NSDCCPTREATMENT_GEN_ALL_CORE_FT
PRINCIPAL PROCEDURE  Procedure: Biopsy, neck  Findings and Treatment: Findings: There is a 2.3 x 2 cm left supraclavicular lymph node. FNA and   core  samples were obtained as described above. Permanent ultrasound   images of the needles within the lymph node were obtained.  Impression: Fine needle aspirate  of left supraclavicular lymph node node   as described above.        SECONDARY PROCEDURE  Procedure: CT angiogram chest w contrast  Findings and Treatment: IMPRESSION:  There are new, bibasilar patchy lung opacities since prior CT chest   8/8/2023, most suggestive of pneumonia.  Stable upper thoracic esophageal mass and stable lymphadenopathy, likely   metastatic.  Negative for pulmonary embolus.       PRINCIPAL PROCEDURE  Procedure: Biopsy, neck  Findings and Treatment: Findings: There is a 2.3 x 2 cm left supraclavicular lymph node. FNA and   core  samples were obtained as described above. Permanent ultrasound   images of the needles within the lymph node were obtained.  Impression: Fine needle aspirate  of left supraclavicular lymph node node   as described above.        SECONDARY PROCEDURE  Procedure: CT angiogram chest w contrast  Findings and Treatment: IMPRESSION:  There are new, bibasilar patchy lung opacities since prior CT chest   8/8/2023, most suggestive of pneumonia.  Stable upper thoracic esophageal mass and stable lymphadenopathy, likely   metastatic.  Negative for pulmonary embolus.      Procedure: EGD  Findings and Treatment: Impressions:  There was no esophageal mass visualized. There was slight external compression  at approximately 24-26 cm from the incisors, but this did not cause any  appreciable narrowing or difficulty in passing the scope. There was a small  erosion at the site of compression. There was also a mucus like substance  suggestive of stasis in the lower esophagus, possibly related to dysmotility.  Erythema in the Antrum compatible with non-erosive gastritis.  Erythema in the anterior bulb compatible with duodenitis.

## 2023-08-23 NOTE — PROGRESS NOTE ADULT - ATTENDING COMMENTS
Patient is a 89 year old male with newly diagnosed ?esophageal mass (appeared intraluminal on CT scan but not directly visualized on EGD) who presented with airway obstruction and stridor likely 2/2 mass effect and surrounding edema. Bx proven squamous cell carcinoma. The patient has had marked improvement with steroid treatment.     I participated in multidisciplinary discussions with the patient, his brother and son, and our radiation oncology colleagues. The patient is opposed to aggressive treatments with high potential side effect burden, and regardless, chemotherapy would not be expected to produce rapid resolutions of mass effect related sx. Therefore no plan to initiate systemic therapy at this time and patient can follow up with med onc as an outpatient.     RT was offered however at this time it appears that the patient is leaning against it.    At this time medical oncology will sign off but we will continue to assist in arranging f/u. Please page with any questions.    Thank you for the opportunity to participate in this patient's care.

## 2023-08-23 NOTE — CONSULT NOTE ADULT - REASON FOR ADMISSION
Acute Hypoxic Respiratory Failure

## 2023-08-23 NOTE — PROGRESS NOTE ADULT - SUBJECTIVE AND OBJECTIVE BOX
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   GI consulted for dysphagia, s/p EGD  with no esophageal mass present  Patient continues to be hemodynamically stable    OBJECTIVE:    VITAL SIGNS:  ICU Vital Signs Last 24 Hrs  T(C): 37 (18 Aug 2023 11:20), Max: 37.1 (17 Aug 2023 22:22)  T(F): 98.6 (18 Aug 2023 11:20), Max: 98.8 (17 Aug 2023 22:22)  HR: 91 (18 Aug 2023 13:07) (62 - 102)  BP: 166/73 (18 Aug 2023 12:00) (95/54 - 172/75)  BP(mean): 105 (18 Aug 2023 12:00) (71 - 109)  ABP: --  ABP(mean): --  RR: 18 (18 Aug 2023 12:30) (14 - 22)  SpO2: 100% (18 Aug 2023 13:07) (93% - 100%)    O2 Parameters below as of 18 Aug 2023 12:30  Patient On (Oxygen Delivery Method): BiPAP/CPAP    O2 Concentration (%): 100      Mode: AC/ CMV (Assist Control/ Continuous Mandatory Ventilation), RR (machine): 20, TV (machine): 450, FiO2: 37, PEEP: 5, ITime: 1, MAP: 10, PIP: 21     @ 07:  -  18 @ 07:00  --------------------------------------------------------  IN: 1540.8 mL / OUT: 969 mL / NET: 571.8 mL     @ 07:  -   @ 13:56  --------------------------------------------------------  IN: 50 mL / OUT: 555 mL / NET: -505 mL      CAPILLARY BLOOD GLUCOSE          PHYSICAL EXAM:  General: conversive, in no acute distress, though with some minimal strido  Neck: supple  Respiratory: CTA b/l  Cardiovascular: +S1/S2; RRR  Abdomen: soft, NT/ND; +BS x4  Extremities: warm, well perfused, no edema/cyanosis  Skin: Clean and intact.   Neurological: AAOX3    MEDICATIONS:  MEDICATIONS  (STANDING):  acetaminophen   IVPB .. 1000 milliGRAM(s) IV Intermittent once  albuterol/ipratropium for Nebulization 3 milliLiter(s) Nebulizer every 6 hours  cefTRIAXone   IVPB 2000 milliGRAM(s) IV Intermittent every 24 hours  chlorhexidine 2% Cloths 1 Application(s) Topical <User Schedule>  heparin   Injectable 5000 Unit(s) SubCutaneous every 8 hours  methylPREDNISolone sodium succinate Injectable 40 milliGRAM(s) IV Push every 8 hours    MEDICATIONS  (PRN):      ALLERGIES:  Allergies    No Known Allergies    Intolerances        LABS:                        7.2    10.39 )-----------( 164      ( 18 Aug 2023 03:30 )             22.7     08-18    138  |  101  |  23  ----------------------------<  231<H>  3.5   |  24  |  1.81<H>    Ca    8.1<L>      18 Aug 2023 03:30  Phos  3.0     08-18  Mg     1.8     08-18    TPro  6.1  /  Alb  3.1<L>  /  TBili  0.7  /  DBili  x   /  AST  16  /  ALT  10  /  AlkPhos  46  08-18    PT/INR - ( 18 Aug 2023 03:30 )   PT: 12.7 sec;   INR: 1.12          PTT - ( 18 Aug 2023 03:30 )  PTT:24.9 sec  Urinalysis Basic - ( 18 Aug 2023 03:33 )    Color: Yellow / Appearance: Clear / S.020 / pH: x  Gluc: x / Ketone: 15 mg/dL  / Bili: Negative / Urobili: 0.2 E.U./dL   Blood: x / Protein: NEGATIVE mg/dL / Nitrite: NEGATIVE   Leuk Esterase: NEGATIVE / RBC: x / WBC x   Sq Epi: x / Non Sq Epi: x / Bacteria: x            Culture - Blood (collected 23 @ 05:55)  Source: .Blood Blood-Venous  Preliminary Report (23 @ 07:00):    No growth at 1 day.    Culture - Blood (collected 23 @ 05:55)  Source: .Blood Blood-Venous  Preliminary Report (23 @ 07:00):    No growth at 1 day.            RADIOLOGY & ADDITIONAL TESTS: Reviewed.

## 2023-08-23 NOTE — PROGRESS NOTE ADULT - ASSESSMENT
89 yo M PMH recently diagnosed esophageal cancer, aspirations, and vocal cord paralysis presented to the ED for 1 day of worsening dyspnea, found to be hypoxic to 90% w/ AMS and worsening CO2 retention, subsequently intubated and admitted to the MICU. Patient has inspiratory stridor s/p extubation 8/18 which may be due to progressive vocal cord compression vs. VCD vs post extubation edema. Started with Solumedrol 40mg BID. As per family wishes, DNR/DNI. Palliative care, heme/onc, IR, and GI involved regarding lymph node biopsy for diagnosis.                 89 yo M PMH recently diagnosed esophageal cancer, aspirations, and vocal cord paralysis presented to the ED for 1 day of worsening dyspnea, found to be hypoxic to 90% w/ AMS and worsening CO2 retention, subsequently intubated and admitted to the MICU, now extubated. S/p LN biopsy with metastatic carcinoma, now made DNR/DNI undergoing continued GOC re: palliative treatment discussions vs. home hospice.

## 2023-08-23 NOTE — CONSULT NOTE ADULT - PROVIDER SPECIALTY LIST ADULT
Rad Onc
Critical Care
Gastroenterology
Palliative Care
Rehab Medicine
ENT
Heme/Onc
Intervent Radiology

## 2023-08-23 NOTE — PROGRESS NOTE ADULT - PROBLEM SELECTOR PLAN 3
Pt presented on 8/8 with complaints of difficulty swallowing, as a result of his neck mass. He was discharged on thick liquids. He then presented again on this latest admission with B/L aspiration pneumonia. S&S recommended NPO diet. Patient tolerating liquids and now on full liquid diet.    Plan    - Pt kept NPO per speech & swallow rec initially, currently on full liquid diet  - currently kept on IV fluids (D5W + LR @ 60cc/hr)  - per GI recs, EGD tomorrow for better visualization of mass (NPO at midnight) ENT following  #Inspiratory stridor  Likely 2/2 progressive vocal cord compression vs VCD vs post extubation edema. Laryngoscope shows known right sided vocal paresis w/ possible left sided edema 2/2 intubation    Plan:   - c/w methylprednisone 40mg IV BID, taper starting 8/24

## 2023-08-23 NOTE — CHART NOTE - NSCHARTNOTEFT_GEN_A_CORE
Patient seen and evaluated by Speech and Swallow who recommended NPO due to dysphagia secondary to obstructive mas and possible dysmotility. Patient requesting liquid diet, for which risk were explain of a possible aspiration event and patient express understanding with family at bedside. patient and family willing to take the risk and will like to proceed with a liquid and ice-cream diet.

## 2023-08-23 NOTE — DISCHARGE NOTE PROVIDER - NSDCFUSCHEDAPPT_GEN_ALL_CORE_FT
Colleen Priest  [facility] Physician [facility]  [facility] [address]  Scheduled Appointment: 08/29/2023    Terry Delcid  Melberoneil Physician [facility]  [address] [address]  Scheduled Appointment: 08/31/2023     Colleen Priest  [facility] Physician [facility]  [facility] [address]  Scheduled Appointment: 08/29/2023    Terry Delcid  [facility]  [address] [address]  Scheduled Appointment: 08/31/2023    [facility]  PALLIATIVE [address]  Scheduled Appointment: 09/08/2023

## 2023-08-23 NOTE — DISCHARGE NOTE PROVIDER - ATTENDING DISCHARGE PHYSICAL EXAMINATION:
PE  Gen: pleasant male in bed in NAD  HEENT: EOMI, hoarse voice  Neck: no jvd  Lungs: CTA b/l non labored  CV: RRR S1S2  GI: +BS nontender  LE: no edema  Psych: pleasant, cooperative.

## 2023-08-23 NOTE — PACU DISCHARGE NOTE - NSCLINEINSERTRD_GEN_ALL_CORE
Call patient if she needs refill as Rx says \" as needed\", so she does not have to take it twice a day and might not need to refill it every month.  Additionally, she needs to be seen every 3 months and do urine drug screening every visit,  
LVM for patient to call back to schedule medication follow-up appointment.  
No

## 2023-08-23 NOTE — PRE-ANESTHESIA EVALUATION ADULT - NSANTHPMHFT_GEN_ALL_CORE
Cardiac: Positive for HTN, HLD. Denies MI/Angina/Heart Failure, Arrhythmia, Murmur/Valvular Disorder. <4 METS  Pulmonary: Right vocal cord paralysis, minimal left vocal cord abduction. Positive for acute respiratory failure with hypoxia, recent intubation in emergency department 8/17/2023. Denies Asthma, COPD, ERIK  Renal: Positive for BRETT Creatinine now 1.21 this AM. Denies chronic kidney dysfunction.  Hepatic: Denies liver dysfunction  Gastrointestinal: Positive for esophageal mass. Denies GERD/IBS  Endocrine: Denies DM or thyroid dysfunction  Neurologic: Intracranial aneurysm. Denies seizure disorder  Hematologic: Positive for anemia. Denies blood clotting disorder, blood thinning medication.

## 2023-08-23 NOTE — CHART NOTE - NSCHARTNOTEFT_GEN_A_CORE
Patient is s/p EGD in the Endo Suite with the following findings and recommendations.     Impressions:   - There was no esophageal mass visualized. There was slight external compression at approximately 24-26 cm from the incisors, but this did not cause any appreciable narrowing or difficulty in passing the scope. There was a small erosion at the site of compression. There was also a mucus like substance suggestive of stasis in the lower esophagus, possibly related to dysmotility.   - Mild gastritis  - Mild duodenitis     Recommendations:  - Would recommend repeat SLP eval to assess optimal diet  - Pending SLP eval, discuss GOC further    Denis Martinez MD  Gastroenterology

## 2023-08-23 NOTE — DIETITIAN NUTRITION RISK NOTIFICATION - ADDITIONAL COMMENTS/DIETITIAN RECOMMENDATIONS
1. Recommend continue NPO pending swallow evaluation.   >>If diet advanced, recommend liberalized diet with Ensure High Protein (350kcal, 20g protein) x2/day. Defer consistency to team/SLP.   >>>Encourage & monitor PO intake. Varney dietary preferences as able.   >>If unable to advance diet and within GOC, recommend initiate nutrition support and consult nutrition services for recommendations.   2. Recommend add multivitamin pending no medical contraindications.

## 2023-08-23 NOTE — DISCHARGE NOTE PROVIDER - NSDCMRMEDTOKEN_GEN_ALL_CORE_FT
albuterol 90 mcg/inh inhalation aerosol: 2 inhaled  amitriptyline 10 mg oral tablet: 1 orally 2 times a day  atenolol 50 mg oral tablet: 1 orally 2 times a day  diazePAM 5 mg oral tablet: 1 orally once a day (at bedtime)  Symbicort 160 mcg-4.5 mcg/inh inhalation aerosol: 2 inhaled   albuterol 90 mcg/inh inhalation aerosol: 2 inhaled  amitriptyline 10 mg oral tablet: 1 orally 2 times a day  atenolol 50 mg oral tablet: 1 orally 2 times a day  diazePAM 5 mg oral tablet: 1 orally once a day (at bedtime)  dorzolamide-timolol 2.23%-0.68% (2%-0.5% base) ophthalmic solution: 1 drop(s) in each eye in the morning and at bedtime  losartan 100 mg oral tablet: 1 tab(s) orally once a day  pilocarpine 4% ophthalmic solution: 1 drop(s) in the left eye 4 times a day in the morning, afternoon, night, and at bedtime  Symbicort 160 mcg-4.5 mcg/inh inhalation aerosol: 2 puff(s) inhaled 2 times a day   albuterol 90 mcg/inh inhalation aerosol: 2 inhaled  amitriptyline 10 mg oral tablet: 1 orally 2 times a day  atenolol 50 mg oral tablet: 1 orally 2 times a day  diazePAM 5 mg oral tablet: 1 orally once a day (at bedtime)  dorzolamide-timolol 2.23%-0.68% (2%-0.5% base) ophthalmic solution: 1 drop(s) in each eye in the morning and at bedtime  losartan 100 mg oral tablet: 1 tab(s) orally once a day  pantoprazole 40 mg oral granule, delayed release: 1 each orally  pilocarpine 4% ophthalmic solution: 1 drop(s) in the left eye 4 times a day in the morning, afternoon, night, and at bedtime  predniSONE 5 mg/5 mL oral solution: 10 milliliter(s) orally 2 times a day Take 20 mLs 8/24 evening, 20 mLs 8/25 am, 10mLs 8/25 pm, 10mLs 8/26 am, and then stop afterwards.  Symbicort 160 mcg-4.5 mcg/inh inhalation aerosol: 2 puff(s) inhaled 2 times a day   albuterol 90 mcg/inh inhalation aerosol: 2 inhaled  amitriptyline 10 mg oral tablet: 1 orally 2 times a day  atenolol 50 mg oral tablet: 1 orally 2 times a day  diazePAM 5 mg oral tablet: 1 orally once a day (at bedtime)  dorzolamide-timolol 2.23%-0.68% (2%-0.5% base) ophthalmic solution: 1 drop(s) in each eye in the morning and at bedtime  losartan 100 mg oral tablet: 1 tab(s) orally once a day  pantoprazole 40 mg oral granule, delayed release: 1 each orally once a day  pilocarpine 4% ophthalmic solution: 1 drop(s) in the left eye 4 times a day in the morning, afternoon, night, and at bedtime  predniSONE 5 mg/5 mL oral solution: 10 milliliter(s) orally 2 times a day Take 10mLs 8/25 pm, 10mLs 8/26 am, and then stop afterwards.  Symbicort 160 mcg-4.5 mcg/inh inhalation aerosol: 2 puff(s) inhaled 2 times a day   albuterol 90 mcg/inh inhalation aerosol: 2 inhaled  amitriptyline 10 mg oral tablet: 1 orally 2 times a day  atenolol 50 mg oral tablet: 1 orally 2 times a day  diazePAM 5 mg oral tablet: 1 orally once a day (at bedtime)  dorzolamide-timolol 2.23%-0.68% (2%-0.5% base) ophthalmic solution: 1 drop(s) in each eye in the morning and at bedtime  losartan 100 mg oral tablet: 1 tab(s) orally once a day  morphine 10 mg/5 mL oral solution: 1.25 milliliter(s) orally every 4 hours as needed for  shortness of breath MDD: 7.5ml  pantoprazole 40 mg oral granule, delayed release: 1 each orally once a day  pilocarpine 4% ophthalmic solution: 1 drop(s) in the left eye 4 times a day in the morning, afternoon, night, and at bedtime  predniSONE 5 mg/5 mL oral solution: 10 milliliter(s) orally 2 times a day Take 10mLs 8/25 pm, 10mLs 8/26 am, and then stop afterwards.  Symbicort 160 mcg-4.5 mcg/inh inhalation aerosol: 2 puff(s) inhaled 2 times a day

## 2023-08-23 NOTE — CONSULT NOTE ADULT - CONSULT REASON
AHRF
Esophageal Mass
PM&R evaluation
Evaluate for RT
stridor
request for left supraclavicular lymph node biopsy
Further evaluation of dysphagia
Complex decision making related to goals of care

## 2023-08-23 NOTE — DISCHARGE NOTE PROVIDER - NSDCCPCAREPLAN_GEN_ALL_CORE_FT
PRINCIPAL DISCHARGE DIAGNOSIS  Diagnosis: Esophageal mass  Assessment and Plan of Treatment: You were noted to have an esophageal mass and came to the hospital with dififculty breathing. You were intubated and went to the ICU, then extubated and came to the medical floor. We biopsied your left supraclavicular lymph node which showed metastatic carcinoma. You also underwent a scope which did not show a visible mass in the esophagus. You also underwent a CT scan of the abdomen/pelvis.   Please attend the following appointments:  - Oncologist Dr. Priest on 8/29  - Palliative care on 9/5  - Radiation oncology (appointment tbd)        SECONDARY DISCHARGE DIAGNOSES  Diagnosis: Dysphagia  Assessment and Plan of Treatment: You have been having dysphagia. You were seen by our speech and swallow specialists who recommended that you do not eat anything at the moment given the risk of aspiration. However after discussions with you about the risk, we are proceeding with a liquid diet for know knowing that you are at risk of aspirating. We spoke with our pharmacists and have confirmed that you may crush your home medications (and mix into apple sauce/juice) if you are unable to swallow the pills.  Please continue to take:  - albuterol inhaler as needed  - amitryptilline 10mg twice daily (can be crushed)  - atenolol 50mg twice daily (can be crushed)  - diazepam 5mg once daily as needed (can be crushed)  - symbicort 2 puffs twice daily  - timolol eye drops each eye every morning and night time  - pilocardpine eyedrops in left eye four times daily  - losartan 100mg by mouth once daily (can be crushed)  NEW MEDICATIONS:  - protonix 40mg oral suspension once a day in the morning  - methylprednisone: please take 20mg 8/24 evening, 20mg 8/25 morning, 10mg 8/25 evening, 10mg 8/26 morning, then stop.     PRINCIPAL DISCHARGE DIAGNOSIS  Diagnosis: Esophageal mass  Assessment and Plan of Treatment: You were noted to have an esophageal mass and came to the hospital with dififculty breathing. You were intubated and went to the ICU, then extubated and came to the medical floor. We biopsied your left supraclavicular lymph node which showed metastatic carcinoma. You also underwent a scope which did not show a visible mass in the esophagus. You also underwent a CT scan of the abdomen/pelvis.   Please attend the following appointments:  - Oncologist Dr. Priest on 8/29  - Palliative care on 9/5  - Radiation oncology (appointment tbd)  - Since you're going home with home hospice, if you are having shortness of breath, can take morphine 2.5mg every three hours orally as needed.      SECONDARY DISCHARGE DIAGNOSES  Diagnosis: Dysphagia  Assessment and Plan of Treatment: You have been having dysphagia. You were seen by our speech and swallow specialists who recommended that you do not eat anything at the moment given the risk of aspiration. However after discussions with you about the risk, we are proceeding with a liquid diet for know knowing that you are at risk of aspirating. We spoke with our pharmacists and have confirmed that you may crush your home medications (and mix into apple sauce/juice) if you are unable to swallow the pills.  Please continue to take:  - albuterol inhaler as needed  - amitryptilline 10mg twice daily (can be crushed)  - atenolol 50mg twice daily (can be crushed)  - diazepam 5mg once daily as needed (can be crushed)  - symbicort 2 puffs twice daily  - timolol eye drops each eye every morning and night time  - pilocardpine eyedrops in left eye four times daily  - losartan 100mg by mouth once daily (can be crushed)  NEW MEDICATIONS:  - protonix 40mg oral suspension once a day in the morning  - methylprednisone: please take 20mg 8/24 evening, 20mg 8/25 morning, 10mg 8/25 evening, 10mg 8/26 morning, then stop.

## 2023-08-23 NOTE — PROGRESS NOTE ADULT - PROBLEM SELECTOR PLAN 6
pt with anemia on initial presentation to ED 8/8 and then again this visit. No signs of bleeding throughout hospitalization and appears stable for now, no need for immediate concern. DDx includes MELISSA, AOCD, Latest 8.9     -active T&S pt with anemia on initial presentation to ED 8/8 and then again this visit. No signs of bleeding throughout hospitalization and appears stable for now, no need for immediate concern. DDx includes MELISSA, AOCD, Latest 8.9     Plan:  -active T&S

## 2023-08-23 NOTE — PROGRESS NOTE ADULT - PROBLEM SELECTOR PLAN 8
F: D5W + LR @ 60cc/hr  E: Replete as necessary K>4 Mg>2  N: Full liquids  DVT Prophylaxis: None  GI prophylaxis: None   CODE STATUS: DNR/DNI
F: D5W + LR @ 60cc/hr  E: Replete as necessary K>4 Mg>2  N: Full liquids  DVT Prophylaxis: None  GI prophylaxis: None   CODE STATUS: DNR/DNI
F: None   E: Replete as necessary K>4 Mg>2  N: f/u s and s recs   DVT Prophylaxis: heparin 5000  GI prophylaxis: None   CODE STATUS: dnr dni

## 2023-08-23 NOTE — DISCHARGE NOTE PROVIDER - DETAILS OF MALNUTRITION DIAGNOSIS/DIAGNOSES
This patient has been assessed with a concern for Malnutrition and was treated during this hospitalization for the following Nutrition diagnosis/diagnoses:     -  08/23/2023: Severe protein-calorie malnutrition

## 2023-08-23 NOTE — DISCHARGE NOTE PROVIDER - HOSPITAL COURSE
#Discharge: do not delete    Carlos Frances is a 89 yo M PMH esophageal mass, aspirations, and vocal cord paralysis presented to the ED for 1 day of worsening dyspnea, found to be hypoxic to 90% w/ AMS and worsening CO2 retention, subsequently intubated and admitted to the MICU, now extubated. S/p LN biopsy with metastatic carcinoma, now made DNR/DNI undergoing continued malignancy work-up and possible palliative treatment.     Hospital course (by problem):   #Esophageal mass with lymadenopathy. S/p L supraclav node biopsy on 8/22 with metastatic carcinoma. EGD without visible mass. Seen by heme-onc, GI, palliative, radiation oncology.   - Follow-up CTAP with IV contrast read (unable to tolerate oral contrast)   - Follow-up with Dr. Priest on 8/29   - Follow-up with radiation oncology  - Follow-up with palliative on 9/8 at 11 am    #Dysphagia. Patient was seen by speech and swallow and recommended for NPO due to aspiration risk. Patient aware of aspiration risk and would prefer full liquid diet.  - Continue methylprednisone taper  - Continue protonix 40mg oral suspension every morning   - Crush home medications (protonix, methylprednisone, valsartan, atenolol, amitryptyline all able to be crushed).    #AHRF. Likely 2/2 aspiration vs. neck mass effect on trachea. Finished ceftriaxone course for 7 days.     #HTN.  - Continue with home atenolol and losartan, can be crushed    Patient was discharged to: home no needs    New medications: protonix, methylprednisone  Changes to old medications:  Medications that were stopped:    Items to follow up as outpatient:    Physical exam at the time of discharge:       #Discharge: do not delete    Carlos Frances is a 89 yo M PMH esophageal mass, aspirations, and vocal cord paralysis presented to the ED for 1 day of worsening dyspnea, found to be hypoxic to 90% w/ AMS and worsening CO2 retention, subsequently intubated and admitted to the MICU, now extubated. S/p LN biopsy with metastatic carcinoma, now made DNR/DNI undergoing continued malignancy work-up and possible palliative treatment.     Hospital course (by problem):   #Esophageal mass with lymphadenopathy. S/p L supraclav node biopsy on 8/22 with metastatic carcinoma. EGD without visible mass. Seen by heme-onc, GI, palliative, radiation oncology.   - Follow-up CTAP with IV contrast read (unable to tolerate oral contrast; done inpatient on 8/24)   - Follow-up with Dr. Priest on 8/29   - Follow-up with radiation oncology next week outpatient (time tbd)  - Follow-up with palliative on 9/8 at 11 am    #Dysphagia. Patient was seen by speech and swallow and recommended for NPO due to aspiration risk. Patient aware of aspiration risk and would prefer full liquid diet.  - Continue methylprednisone taper 20 bid through 8/25 am, 10bid through 8/26 am, then stop (crushing okay)  - Continue protonix 40mg oral suspension every morning   - Crush home medications (protonix, methylprednisone, valsartan, atenolol, amitryptyline all able to be crushed).    #AHRF. Likely 2/2 aspiration vs. neck mass effect on trachea. Finished ceftriaxone course for 7 days.     #HTN.  - Continue with home atenolol and losartan, can be crushed    Patient was discharged to: home no needs    New medications: protonix, methylprednisone    Items to follow up as outpatient:  - cancer work-up    Physical exam at the time of discharge:  General: Well-appearing, NAD. Hoarse voice  HEENT: NC/AT; PERRL, anicteric sclera; MMM  Neck: Large frontal neck mass  Cardiovascular: +S1/S2; RRR  Respiratory: Mild inspiratory stridor. CTA B/L; no W/R/R  Gastrointestinal: soft, NT/ND; +BSx4  Extremities: WWP; no edema, clubbing or cyanosis  Vascular: 2+ radial, DP/PT pulses B/L  Neurological: AAOx3; no focal deficits       #Discharge: do not delete    Carlos Frances is a 89 yo M PMH esophageal mass, aspirations, and vocal cord paralysis presented to the ED for 1 day of worsening dyspnea, found to be hypoxic to 90% w/ AMS and worsening CO2 retention, subsequently intubated and admitted to the MICU, now extubated. S/p LN biopsy with metastatic carcinoma, now made DNR/DNI undergoing continued malignancy work-up and possible palliative treatment.     Hospital course (by problem):   #Esophageal mass with lymphadenopathy. S/p L supraclav node biopsy on 8/22 with metastatic carcinoma. EGD without visible mass. Seen by heme-onc, GI, palliative, radiation oncology.   - Follow-up CTAP with IV contrast read (unable to tolerate oral contrast; done inpatient on 8/24)   - Follow-up with Dr. Priest on 8/29   - Follow-up with radiation oncology next week outpatient (time tbd)  - Follow-up with palliative on 9/8 at 11 am    #Dysphagia. Patient was seen by speech and swallow and recommended for NPO due to aspiration risk. Patient aware of aspiration risk and would prefer full liquid diet.  - Continue prednisone taper 10bid through 8/26 am, then stop (crushing okay)  - Continue protonix 40mg oral suspension every morning   - Crush home medications (protonix, methylprednisone, valsartan, atenolol, amitryptyline all able to be crushed).    #AHRF. Likely 2/2 aspiration vs. neck mass effect on trachea. Finished ceftriaxone course for 7 days.     #HTN.  - Continue with home atenolol and losartan, can be crushed    Patient was discharged to: home    New medications: protonix, prednisone    Items to follow up as outpatient:  - cancer work-up    Physical exam at the time of discharge:  General: Well-appearing, NAD. Hoarse voice  HEENT: NC/AT; PERRL, anicteric sclera; MMM  Neck: Large frontal neck mass  Cardiovascular: +S1/S2; RRR  Respiratory: Mild inspiratory stridor. CTA B/L; no W/R/R  Gastrointestinal: soft, NT/ND; +BSx4  Extremities: WWP; no edema, clubbing or cyanosis  Vascular: 2+ radial, DP/PT pulses B/L  Neurological: AAOx3; no focal deficits       #Discharge: do not delete    Carlos Frances is a 89 yo M PMH esophageal mass, aspirations, and vocal cord paralysis presented to the ED for 1 day of worsening dyspnea, found to be hypoxic to 90% w/ AMS and worsening CO2 retention, subsequently intubated and admitted to the MICU, now extubated. S/p LN biopsy with metastatic carcinoma, now made DNR/DNI undergoing continued malignancy work-up and possible palliative treatment.     Hospital course (by problem):   #Esophageal mass with lymphadenopathy. S/p L supraclav node biopsy on 8/22 with metastatic carcinoma. EGD without visible mass. Seen by heme-onc, GI, palliative, radiation oncology.  Going with home hospice.  - Follow-up CTAP with IV contrast read (unable to tolerate oral contrast; done inpatient on 8/24)   - Follow-up with Dr. Priest on 8/29   - Follow-up with radiation oncology next week outpatient (time tbd)  - Follow-up with palliative on 9/8 at 11 am    #Dysphagia. Patient was seen by speech and swallow and recommended for NPO due to aspiration risk. Patient aware of aspiration risk and would prefer full liquid diet.  - Continue prednisone taper 10bid through 8/26 am, then stop (crushing okay)  - Continue protonix 40mg oral suspension every morning   - Crush home medications (protonix, methylprednisone, valsartan, atenolol, amitryptyline all able to be crushed).    #AHRF. Likely 2/2 aspiration vs. neck mass effect on trachea. Finished ceftriaxone course for 7 days.     #HTN.  - Continue with home atenolol and losartan, can be crushed    Patient was discharged to: home    New medications: protonix, prednisone    Items to follow up as outpatient:  - cancer work-up    Physical exam at the time of discharge:  General: Well-appearing, NAD. Hoarse voice  HEENT: NC/AT; PERRL, anicteric sclera; MMM  Neck: Large frontal neck mass  Cardiovascular: +S1/S2; RRR  Respiratory: Mild inspiratory stridor. CTA B/L; no W/R/R  Gastrointestinal: soft, NT/ND; +BSx4  Extremities: WWP; no edema, clubbing or cyanosis  Vascular: 2+ radial, DP/PT pulses B/L  Neurological: AAOx3; no focal deficits

## 2023-08-23 NOTE — CONSULT NOTE ADULT - CONSULT REQUESTED DATE/TIME
22-Aug-2023 05:35
18-Aug-2023 12:20
17-Aug-2023 08:08
17-Aug-2023 17:53
22-Aug-2023 14:19
23-Aug-2023 17:12
22-Aug-2023 12:07
17-Aug-2023 15:56

## 2023-08-23 NOTE — DISCHARGE NOTE PROVIDER - CARE PROVIDER_API CALL
Colleen Priest Randy  Medical Oncology  [address]  [place] [address]  Phone: [phone]  Fax: [phone]  Scheduled Appointment: 08/29/2023    Katalina Ward  Internal Medicine  [address] [address]  Phone: [phone]  Fax: [phone]  Scheduled Appointment: 09/08/2023 11:00 AM

## 2023-08-23 NOTE — PROGRESS NOTE ADULT - PROBLEM SELECTOR PLAN 4
ENT following  #Inspiratory stridor  Likely 2/2 progressive vocal cord compression vs VCD vs post extubation edema. Laryngoscope shows known right sided vocal paresis w/ possible left sided edema 2/2 intubation  - f/u ent recs   - solumedrol 40 mg BID  - Outpatient cord injections as tolerated Possibly 2/2 aspiration PNA vs. neck mass effect on trachea. CT pointing towards most likely bilateral aspiration PNA, but cannot r/o increased mass effect compromising airway.    Plan:   - c/w ceftriaxone 2g IV q24 through 8/23

## 2023-08-23 NOTE — PROGRESS NOTE ADULT - PROBLEM SELECTOR PLAN 5
On home atenolol, currently being held. SBP consistently 170s-180s    - Pt unable to tolerate PO, IV Enalapril 1.25 q6h started 08/22 for persistently elevated BPs On home atenolol, currently being held. SBP consistently 170s-180s    Plan:   - Pt unable to tolerate PO  - Increased to IV enalipril 2.5mg q6 for persistenty elevated BPs

## 2023-08-23 NOTE — PROGRESS NOTE ADULT - SUBJECTIVE AND OBJECTIVE BOX
OVERNIGHT EVENTS: LN biopsied yesterday. NPO overnight for EGD.    SUBJECTIVE / INTERVAL HPI: Patient seen and examined at bedside. Feels well, no tenderness at biopsy site.     PHYSICAL EXAM:    General: Well-appearing, NAD. Hoarse voice  HEENT: NC/AT; PERRL, anicteric sclera; MMM  Neck: Large frontal neck mass  Cardiovascular: +S1/S2; RRR  Respiratory: Mild inspiratory stridor. CTA B/L; no W/R/R  Gastrointestinal: soft, NT/ND; +BSx4  Extremities: WWP; no edema, clubbing or cyanosis  Vascular: 2+ radial, DP/PT pulses B/L  Neurological: AAOx3; no focal deficits    VITAL SIGNS:  Vital Signs Last 24 Hrs  T(C): 37.2 (23 Aug 2023 14:10), Max: 37.2 (23 Aug 2023 14:10)  T(F): 99 (23 Aug 2023 14:10), Max: 99 (23 Aug 2023 14:10)  HR: 79 (23 Aug 2023 14:10) (77 - 97)  BP: 162/76 (23 Aug 2023 14:10) (161/74 - 174/91)  BP(mean): 99 (23 Aug 2023 12:05) (99 - 102)  RR: 18 (23 Aug 2023 14:10) (18 - 20)  SpO2: 96% (23 Aug 2023 14:10) (95% - 96%)    Parameters below as of 23 Aug 2023 14:10  Patient On (Oxygen Delivery Method): room air          MEDICATIONS:  MEDICATIONS  (STANDING):  albuterol/ipratropium for Nebulization 3 milliLiter(s) Nebulizer every 6 hours  cefTRIAXone   IVPB 2000 milliGRAM(s) IV Intermittent every 24 hours  dextrose 5% + lactated ringers. 1000 milliLiter(s) (60 mL/Hr) IV Continuous <Continuous>  enalaprilat Injectable 2.5 milliGRAM(s) IV Push every 6 hours  methylPREDNISolone sodium succinate Injectable 40 milliGRAM(s) IV Push every 12 hours    MEDICATIONS  (PRN):  HYDROmorphone  Injectable 0.5 milliGRAM(s) IV Push every 1 hour PRN Dyspnea, Increased work of breathing, anxiety      ALLERGIES:  Allergies    No Known Allergies    Intolerances        LABS:                        9.4    7.95  )-----------( 145      ( 23 Aug 2023 05:30 )             30.4     08-23    142  |  107  |  30<H>  ----------------------------<  152<H>  4.2   |  28  |  1.21    Ca    8.9      23 Aug 2023 05:30  Phos  2.6     08-23  Mg     2.5     08-23        Urinalysis Basic - ( 23 Aug 2023 05:30 )    Color: x / Appearance: x / SG: x / pH: x  Gluc: 152 mg/dL / Ketone: x  / Bili: x / Urobili: x   Blood: x / Protein: x / Nitrite: x   Leuk Esterase: x / RBC: x / WBC x   Sq Epi: x / Non Sq Epi: x / Bacteria: x      CAPILLARY BLOOD GLUCOSE          RADIOLOGY & ADDITIONAL TESTS: Reviewed.

## 2023-08-23 NOTE — CONSULT NOTE ADULT - ASSESSMENT
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 Patient and family at bedside aware of diagnosis and given his age and overall functional status, they are unlikely to seek aggressive therapy options, and instead will be prioritizing quality of life. At this time he is not a candidate for chemotherapy, atleast not concurrent chemotherapy with radiation for what appears to be an esophageal primary based on CT imaging.      We discussed the use of palliative radiation in this setting, namely to improve quality of life through the reduction of symptoms. We discussed a 5-10 fraction course of palliative radiation. We talked about the risks, benefits, acute and long term side effects, as well as expected treatment outcomes. We discussed acutely the increased risk of tumor swelling after RT and worsening dysphagia and pain. The aim of treatment would be for palliation of pain and possible tumor shrinkage for improvement of quality of life but not for long term control or cure as that would require concurrent chemoRT and surgery for potential curative option.     He was given the opportunity to ask questions, which were answered to his apparent satisfaction. We will arrange likely arrange for outpatient treatment and if patient is agreeable to radiation therapy, a CT Simulation will be scheduled.     Raad Rodriguez, PGY5  Radiation Medicine  Teams or [email or web address]    Case d/w attending Dr. 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 Patient and family at bedside aware of diagnosis and given his age and overall functional status, they are unlikely to seek aggressive therapy options, and instead will be prioritizing quality of life. At this time he is not a candidate for chemotherapy, atleast not concurrent chemotherapy with radiation for what appears to be an esophageal primary based on CT imaging.      We discussed the use of palliative radiation in this setting, namely to improve quality of life through the reduction of symptoms. We discussed a 5-10 fraction course of palliative radiation. We talked about the risks, benefits, acute and long term side effects, as well as expected treatment outcomes. We discussed acutely the increased risk of tumor swelling after RT and worsening dysphagia and pain. The aim of treatment would be for palliation of pain and possible tumor shrinkage for improvement of quality of life but not for long term control or cure as that would require concurrent chemoRT and surgery for potential curative option.     He was given the opportunity to ask questions, which were answered to his apparent satisfaction. We will arrange likely arrange for outpatient treatment and if patient is agreeable to radiation therapy, a CT Simulation will be scheduled.     Raad Rodriguez, PGY5  Radiation Medicine  Teams or [email or web address]    Case d/w attending Dr. 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 He is admitted in the setting of aspiration PNA, now s/p extubation and discharge to floor from ICU. He is doing well overall. Per patient, he denies pain or sensation of food sticking but does report "mucus that makes me throw up my food" that has been progressing over the past weeks. He was evaluated by med onc and thoracic surgery prior to pathologic diagnosis of malignancy, with their recommendations pending final path and staging.    Definitive treatment for esophageal cancer involves multimodality therapy, with high-dose CRT or surgery. Randomized trials have demonstrated that RT alone is not a definitive option, even at escalated doses. Final surgical and med onc recs are pending, but given patient's age, it may be challenging to complete an aggressive treatment course. At this time, patient may be a candidate for palliative RT as an alternative option to alleviate obstructive symptoms and give him durable disease control. His performance status is moderate, and he is independent with his ADLs. His symptoms do not appear overwhelming at this time, though I suspect based on history and EGD that the recent vomiting he has experienced (and which may have contributed to his aspiration) may be sequelae of extrinsic esophageal compression. It is somewhat unusual that his SCC had no intraluminal presence, though the lesion also appears atypical for a shelia metastasis from another H&N subsite. Completion staging and final path are pending, and we will follow closely.    Given patient is doing well now, we will plan to follow up with him next week as an outpatient after touching base with Dr. Priest and Dr. Teixeira in med onc and Dr. Dorado in thoracic surgery. If palliative RT is deemed an appropriate next step, would recommend 30-40 Gy in 10-15 fx.    Patient amenable to plan.    Amiclar Pollard  Rad Onc

## 2023-08-23 NOTE — DIETITIAN NUTRITION RISK NOTIFICATION - TREATMENT: THE FOLLOWING DIET HAS BEEN RECOMMENDED
Diet, NPO after Midnight:      NPO Start Date: 22-Aug-2023,   NPO Start Time: 23:59 (08-22-23 @ 17:02) [Active]

## 2023-08-23 NOTE — PROGRESS NOTE ADULT - PROBLEM SELECTOR PLAN 1
Possibly 2/2 aspiration PNA vs. neck mass effect on trachea. CT pointing towards most likely bilateral aspiration PNA, but cannot r/o increased mass effect compromising airway.  - s/p extubation, now satting well on room air, weaned of NC successfully  - c/w ceftriaxone 2g IV q24  - c/w methylprednisolone 40mg IVP Q12 New diagnosis, oncology f/u outpt had described a differential of (primary esophogeal, thyroid, lymphoma) and was scheduled for a L supraclavicular node biopsy on 8/18. Oncology consulted, verbally let us know that he has never had the mass or enlarged lymph nodes biopsied. A biopsy would be needed to determine route of care (if solid cancer would lean radiation, if lymphoma would lean medical/chemo management). Core biopsy IR procedure order from 08/18 cancelled 2/2 respiratory distress, patient now s/p core needle biopsy on 08/22, now consistent with metastatic LN+ carcinoma. EGD without visible mass.     Plan:   - palliative, GI, and heme/onc on-board, appreciate recs   - radiation oncology consulted re: palliative RT options  - ongoing GOC discussions with family

## 2023-08-23 NOTE — PROGRESS NOTE ADULT - PROBLEM SELECTOR PLAN 7
Can't rule out transient intrinsic insult s/p CTA with contrast. However FeNa 0.1% suggesting pre renal etiology possibly 2/2 to poor PO intake prior to admission. Cr normalized on 08/22 to 1.29.    - CTM BUN/Cr   - avoid nephrotoxic meds if able Can't rule out transient intrinsic insult s/p CTA with contrast. However FeNa 0.1% suggesting pre renal etiology possibly 2/2 to poor PO intake prior to admission. Cr normalized on 08/22 to 1.29. RESOLVED.    Plan:   - CTM BUN/Cr   - avoid nephrotoxic meds if able

## 2023-08-23 NOTE — CONSULT NOTE ADULT - SUBJECTIVE AND OBJECTIVE BOX
Admission HPI:  89 yo M PMH recently diagnosed esophageal tumor, aspirations and vocal cord paralysis who presented to the ED for 1 day of worsening dyspnea. Pt recently presented to [facility] on 08/08 for 3 months of hoarseness and difficulty swallowing, evaluated by ENT, found to have esophageal mass/cancer with R vocal cord paralysis. This AM pt presented to the ED for worsening shortness of breath and increasing lethargy/fatigue. On arrival pt was found to be hypoxic to 90% breathing at 27/min. BiPAP was initiated however pt became more lethargic w/ AMS and worsening CO2 retention. Pt was intubated and admitted to the MICU.  Imaging: CTA chest: New, bibasilar patchy lung opacities since prior CT chest 8/8/2023, most suggestive of pneumonia. Stable upper thoracic esophageal mass and stable lymphadenopathy, likely metastatic. Negative for pulmonary embolus.    In the MICU patient arrived intubated on propofol, in NAD, and was transitioned to zosyn. Patient's family was called to let them know pt was in the hospital and to collect collateral on the patient. The patient's wife Shilpi noted that Mr. Frances was scheduled to get a biopsy of his left supraclavicular node that was noted to be enlarged, biopsy was scheduled for tomorrow 8/18. Spoke with Mr. Frances's granddaughter as well who worked as a palliative care nurse, she stated that they had family discussions prior to this admission/event regarding Mr. Frances's goals of care and health plans moving forward. She noted that Mr. Frances believed he was "indio to be alive" and was open to continued discussions about how he wanted his life/health care plans to play out.     Radiation Medicine: Patient seen at bedside with son and brother also present. He is aware of diagnosis of cancer, and per him and family prioritizes QOL. Patient seen with Medical Oncology at bedside also.     Allergies  No Known Allergies  Intolerances    ROS: [  ] Fever  [  ] Chills  [  ]Chest Pain [  ] SOB  [  ]Cough [  ] N/V  [  ] Diarrhea [  ]Constipation [  ]Other ROS:  [  ] ROS otherwise negative    PAST MEDICAL & SURGICAL HISTORY:  HTN (hypertension)  History of intracranial aneurysm  Esophageal mass    MEDICATIONS  (STANDING):  albuterol/ipratropium for Nebulization 3 milliLiter(s) Nebulizer every 6 hours  cefTRIAXone   IVPB 2000 milliGRAM(s) IV Intermittent every 24 hours  dextrose 5% + lactated ringers. 1000 milliLiter(s) (60 mL/Hr) IV Continuous <Continuous>  enalaprilat Injectable 2.5 milliGRAM(s) IV Push every 6 hours  methylPREDNISolone sodium succinate Injectable 30 milliGRAM(s) IV Push every 12 hours    MEDICATIONS  (PRN):  HYDROmorphone  Injectable 0.5 milliGRAM(s) IV Push every 1 hour PRN Dyspnea, Increased work of breathing, anxiety    PHYSICAL EXAM  Vital Signs Last 24 Hrs  T(C): 37.2 (23 Aug 2023 14:10), Max: 37.2 (23 Aug 2023 14:10)  T(F): 99 (23 Aug 2023 14:10), Max: 99 (23 Aug 2023 14:10)  HR: 79 (23 Aug 2023 14:10) (79 - 97)  BP: 162/76 (23 Aug 2023 14:10) (161/74 - 174/91)  BP(mean): 99 (23 Aug 2023 12:05) (99 - 102)  RR: 18 (23 Aug 2023 14:10) (18 - 18)  SpO2: 96% (23 Aug 2023 14:10) (95% - 96%)  Parameters below as of 23 Aug 2023 14:10  Patient On (Oxygen Delivery Method): room air    KPS 60  General: no acute distress, patient alert and aware, speaking full sentances with hoarse voice.  HEENT: NC/AT; EOMI, PERRL, mucous membranes moist  CV: NR, RR  Lungs: no increased work of breathing  Abdomen: soft, NTND  Neuro: alert and aware, moving all extremities   Psych: Full affect; mood congruent  Skin: no visible rashes on limited examination    IMAGING/LABS/PATHOLOGY: I have personally reviewed the relevant labs, pathology, and imaging as noted in the HPI.  8/22/23 - Biopsy of left SCV node showing metastatic carcinoma, favor squamous cell carcinoma.  8/23/23 - EGD showing no mass  8/17/23 - CT Chest there is a stable intraluminal mass of the upper esophagus with adjacent fat fatty soft tissue infiltration. Stable 1.1 cm left upper paratracheal stable mildly enlarged bilateral hilar lymph nodes. 1.4 cm short axis subcarinal lymph node.  8/8/23 - CT Neck A 3.9 cm proximal esophagus mass is most concerning for malignancy. There is a right paratracheal node adjacent to it with gross extranodal spread invading the thyroid gland and possibly the recurrent laryngeal nerve. Correlate for a right vocal palsy.

## 2023-08-24 PROBLEM — K22.89 OTHER SPECIFIED DISEASE OF ESOPHAGUS: Chronic | Status: ACTIVE | Noted: 2023-01-01

## 2023-08-24 PROBLEM — Z86.79 PERSONAL HISTORY OF OTHER DISEASES OF THE CIRCULATORY SYSTEM: Chronic | Status: ACTIVE | Noted: 2023-01-01

## 2023-08-24 PROBLEM — I10 ESSENTIAL (PRIMARY) HYPERTENSION: Chronic | Status: ACTIVE | Noted: 2023-01-01

## 2023-08-24 NOTE — PROGRESS NOTE ADULT - SUBJECTIVE AND OBJECTIVE BOX
Pt seen and examined at bedside this morning. He states he is doing well with no complaints. Has been consuming liquid and ice cream diet with no issues. Denies nausea, vomiting, shortness of breath.    Allergies    No Known Allergies    Intolerances      MEDICATIONS:  MEDICATIONS  (STANDING):  albuterol/ipratropium for Nebulization 3 milliLiter(s) Nebulizer every 6 hours  atenolol  Tablet 50 milliGRAM(s) Oral every 12 hours  enalaprilat Injectable 5 milliGRAM(s) IV Push every 6 hours  iohexol 300 mG (iodine)/mL Oral Solution 30 milliLiter(s) Oral once  methylPREDNISolone sodium succinate Injectable 20 milliGRAM(s) IV Push every 12 hours  pantoprazole   Suspension 40 milliGRAM(s) Oral daily    MEDICATIONS  (PRN):  HYDROmorphone  Injectable 0.5 milliGRAM(s) IV Push every 1 hour PRN Dyspnea, Increased work of breathing, anxiety    Vital Signs Last 24 Hrs  T(C): 36.3 (24 Aug 2023 10:56), Max: 37.2 (23 Aug 2023 14:10)  T(F): 97.3 (24 Aug 2023 10:56), Max: 99 (23 Aug 2023 14:10)  HR: 86 (24 Aug 2023 10:56) (79 - 97)  BP: 158/85 (24 Aug 2023 10:56) (150/71 - 165/85)  BP(mean): --  RR: 18 (24 Aug 2023 10:56) (18 - 20)  SpO2: 100% (24 Aug 2023 10:56) (96% - 100%)    Parameters below as of 24 Aug 2023 10:56  Patient On (Oxygen Delivery Method): room air        08-23 @ 07:01  -  08-24 @ 07:00  --------------------------------------------------------  IN: 720 mL / OUT: 0 mL / NET: 720 mL      PHYSICAL EXAM:    General: Well developed; well nourished; in no acute distress  HEENT: MMM, conjunctiva and sclera clear  Respiratory: No respiratory distress. No intercostal retractions  Gastrointestinal: Soft non-tender non-distended; No rebound or guarding  Skin: Warm and dry.  Neuro: A&O x 3.    LABS:                        8.9    8.11  )-----------( 131      ( 24 Aug 2023 07:58 )             29.8     08-24    144  |  108  |  26<H>  ----------------------------<  129<H>  4.2   |  29  |  1.06    Ca    8.8      24 Aug 2023 07:58  Phos  2.5     08-24  Mg     2.3     08-24            Urinalysis Basic - ( 24 Aug 2023 07:58 )    Color: x / Appearance: x / SG: x / pH: x  Gluc: 129 mg/dL / Ketone: x  / Bili: x / Urobili: x   Blood: x / Protein: x / Nitrite: x   Leuk Esterase: x / RBC: x / WBC x   Sq Epi: x / Non Sq Epi: x / Bacteria: x                RADIOLOGY & ADDITIONAL STUDIES:

## 2023-08-24 NOTE — PROGRESS NOTE ADULT - PROBLEM SELECTOR PLAN 4
Patient is known to Dr. Priest and patient wants to proceed with a biopsy to know what he has, but at the same time he wants to be comfortable at home.  Home hospice was discussed and patient and son are amenable to a referral. Appreciate Oncology involvement. Patient to go home with home hospice.

## 2023-08-24 NOTE — PROGRESS NOTE ADULT - PROBLEM SELECTOR PLAN 5
Home atenolol and losartan. Using IV medications given dysphagia. Now okay to resume some PO (crushed) as patient is willing to accept aspiration risk.    Plan:   - Resume home atenolol (can be crushed)  - Increased to IV enalipril 5mg q6 for persistenty elevated BPs

## 2023-08-24 NOTE — PROGRESS NOTE ADULT - PROBLEM SELECTOR PLAN 1
New diagnosis, oncology f/u outpt had described a differential of (primary esophogeal, thyroid, lymphoma) and was scheduled for a L supraclavicular node biopsy on 8/18. Oncology consulted, verbally let us know that he has never had the mass or enlarged lymph nodes biopsied. A biopsy would be needed to determine route of care (if solid cancer would lean radiation, if lymphoma would lean medical/chemo management). Core biopsy IR procedure order from 08/18 cancelled 2/2 respiratory distress, patient now s/p core needle biopsy on 08/22, now consistent with metastatic LN+ carcinoma. EGD without visible mass.     Plan:   - palliative, GI, and heme/onc on-board, appreciate recs   - radiation oncology - can offer outpatient palliative radiation  - ongoing GOC discussions with family

## 2023-08-24 NOTE — PROGRESS NOTE ADULT - PROBLEM SELECTOR PLAN 5
Patient confirmed his wishes to be kept comfortable and to remain a DNR/DNI. He does not want nay more invasive procedures likes a feeding tube because he does not want to prolong his suffering. Plan for home hospice. Patient and family do not want any more procedures. Emotional support was provided.   - Scientology/Spiritual practice: Unknown   - Coping: [x ] well [ ] with difficulty [ ] poor coping    - Support system: [ x] strong [ ] adequate [ ] inadequate  - All questions answered, emotional support provided  -  primary team   - Please contact Palliative Medicine 24/7 at [phone] for any acute symptoms or further questions  - Will continue to follow with you.

## 2023-08-24 NOTE — PROGRESS NOTE ADULT - NS ATTEST RISK PROBLEM GEN_ALL_CORE FT
Prescription Of Parenteral Controlled Substances
Aspiration Pneumonia  #Hypernatremia  #BRETT  #Esophageal cancer
Actively Dying  Acute Illness That Poses A Threat To Life  Prescription Of Parenteral Controlled Substances

## 2023-08-24 NOTE — PROGRESS NOTE ADULT - PROBLEM SELECTOR PLAN 2
Pt presented on 8/8 with complaints of difficulty swallowing, as a result of his neck mass. He was discharged on thick liquids. He then presented again on this latest admission with B/L aspiration pneumonia. S&S recommended NPO diet.     Plan  - Should be NPO for aspiration risk, but patient understanding of aspiration risk and wants liquid diet  - Can stop MIVF

## 2023-08-24 NOTE — PROGRESS NOTE ADULT - PROBLEM SELECTOR PLAN 1
Patient with some SOB at this time.  Would recommend MSIR 1mg q2h IV PRN, at discharge would send MSIR 2.5mg q3h PO PRN.

## 2023-08-24 NOTE — PROGRESS NOTE ADULT - SUBJECTIVE AND OBJECTIVE BOX
RAMANDEEP CALDERA             MRN-[id number]    CC: Acute Hypoxic Respiratory Failure    HPI:  89 yo M PMH recently diagnosed esophageal cancer, aspirations and vocal cord paralysis who presented to the ED for 1 day of worsening dyspnea. Pt recently presented to [facility] on 08/08 for 3 months of hoarseness and difficulty swallowing, evaluated by ENT, found to have esophageal mass/cancer with R vocal cord paralysis. This AM pt presented to the ED for worsening shortness of breath and increasing lethargy/fatigue. On arrival pt was found to be hypoxic to 90% breathing at 27/min. BiPAP was initiated however pt became more lethargic w/ AMS and worsening CO2 retention. Pt was intubated and admitted to the MICU.     In the ED:  Initial vital signs: T: 98.3 HR: 101, BP: 191/98, R: 27, SpO2: 90% on RA  Labs: significant for WBC 6.2, Hgb 9.1, Trop 256 -> 194, Na 141, K 4.0, Cr 1.31, lactate 1.8, BNP 1780, VpH 7.29 -> 7.08 -> 7.17  Imaging: CTA chest: New, bibasilar patchy lung opacities since prior CT chest 8/8/2023, most suggestive of pneumonia. Stable upper thoracic esophageal mass and stable lymphadenopathy, likely metastatic. Negative for pulmonary embolus.  EKG: NSR, LBBB   Medications: CTX 2g,  Azithro 500, Mg 2g, Solumedrol 125mg, duonebs    In the MICU patient arrived intubated on propofol, in NAD, and was transitioned to zosyn.   Patient's family was called to let them know pt was in the hospital and to collect collateral on the patient. The patient's wife Shilpi noted that Mr. Caldera  was scheduled to get a biopsy of his left supraclavicular node that was noted to be enlarged, biopsy was scheduled for tomorrow 8/18. Spoke with Mr. Caldera's granddaughter as well who worked as a palliative care nurse, she stated that they had family discussions prior to this admission/event regarding Mr. Caldera's goals of care and health plans moving forward. She noted that Mr. Caldera believed he was "indio to be alive" and was open to continued discussions about how he wanted his life/health care plans to play out.  (17 Aug 2023 10:18)    SUBJECTIVE: Patient resting in bed in no distress. No family at bedside.    ROS:  DYSPNEA (Linda): Y / N	  NAUS/VOM: Y / N	  SECRETIONS: Y / N	  AGITATION: Y / N  Pain (Y/N):       -Provocation/Palliation:  -Quality/Quantity:  -Radiating:  -Severity:  -Timing/Frequency:  -Impact on ADLs:    OTHER REVIEW OF SYSTEMS:  UNABLE TO OBTAIN  due to:    PEx:  T(C): 36.3 (08-24-23 @ 10:56), Max: 37.2 (08-23-23 @ 14:10)  HR: 86 (08-24-23 @ 10:56) (79 - 97)  BP: 158/85 (08-24-23 @ 10:56) (150/71 - 165/85)  RR: 18 (08-24-23 @ 10:56) (18 - 20)  SpO2: 100% (08-24-23 @ 10:56) (96% - 100%)  Wt(kg): --    GENERAL:  [ ]Alert  [ ]Oriented x   [ ]Lethargic due to sedation  [ ]Cachexia [ ]Verbal  [ ]Non-Verbal  Behavioral:   [ ] Anxiety  [ ] Delirium [ ] Agitation [ ] Other  HEENT:  [ ]Normal   [ ]Dry mouth   [ ]ET Tube  [ ]Oral lesions  PULMONARY:   [ ]Clear  [ ]Tachypnea  [ ]Audible excessive secretions   [ ]Rhonchi     [ ]Right [ ]Left [ ]Bilateral  [ ]Crackles        [ ]Right [ ]Left [ ]Bilateral  [ ]Wheezing     [ ]Right [ ]Left [ ]Bilateral  CARDIOVASCULAR:    [ ]Regular [ ]Irregular [ ]Tachy  [ ]Tree [ ]Murmur [ ]Other  GASTROINTESTINAL:  [ ]Soft  [ ]Distended   [ ]+BS  [ ]Non tender [ ]Tender  [ ]PEG [ ]OGT/NGT  [] flexiseal with output  Last BM:   GENITOURINARY:  [ ]Normal [ ] Incontinent   [ ]Oliguria/Anuria   [ ]Mulligan  MUSCULOSKELETAL:   [ ]Normal   [ ]Weakness  [ ]Bed/Wheelchair bound [ ]Edema  NEUROLOGIC:   [ ]No focal deficits  [ ] Cognitive impairment  [ ] Dysphagia [ ]Dysarthria [ ] Paresis [  ]Other   SKIN:   [ ]Normal   [ ]Pressure ulcer(s)  [ ]Rash       ALLERGIES: No Known Allergies      OPIATE NAÏVE (Y/N):    MEDICATIONS: REVIEWED  MEDICATIONS  (STANDING):  albuterol/ipratropium for Nebulization 3 milliLiter(s) Nebulizer every 6 hours  atenolol  Tablet 50 milliGRAM(s) Oral every 12 hours  enalaprilat Injectable 5 milliGRAM(s) IV Push every 6 hours  iohexol 300 mG (iodine)/mL Oral Solution 30 milliLiter(s) Oral once  methylPREDNISolone sodium succinate Injectable 20 milliGRAM(s) IV Push every 12 hours  pantoprazole   Suspension 40 milliGRAM(s) Oral daily    MEDICATIONS  (PRN):  HYDROmorphone  Injectable 0.5 milliGRAM(s) IV Push every 1 hour PRN Dyspnea, Increased work of breathing, anxiety      LABS: REVIEWED  CBC:                        8.9    8.11  )-----------( 131      ( 24 Aug 2023 07:58 )             29.8     CMP:    08-24    144  |  108  |  26<H>  ----------------------------<  129<H>  4.2   |  29  |  1.06    Ca    8.8      24 Aug 2023 07:58  Phos  2.5     08-24  Mg     2.3     08-24        IMAGING: REVIEWED    ADVANCED DIRECTIVES:            FULL CODE            DNR DNI            LIVING WILL            DPOA       HCP       MOLST    DECISION MAKER:   LEGAL SURROGATE:    PSYCHOSOCIAL-SPIRITUAL ASSESSMENT:       Reviewed       Care plan unchanged       Care plan adjusted as above	    GOALS OF CARE DISCUSSION       Palliative care info/counseling provided	           Family meeting       Advanced Directives addressed please see Advance Care Planning Note	           See previous Palliative Medicine Note       Documentation of GOC:   	      AGENCY CHOICE DISCUSSED:           Homecare        Hospice        [facility]        Other:         REFERRALS:	        Palliative Med        Unit SW/Case Mgmt              Speech/Swallow       Patient/Family Support       Massage Therapy       Music Therapy       Pet Therapy       Hospice       Nutrition       Dietician       PT/OT CARLOS CALDERA             MRN-[id number]    CC: Acute Hypoxic Respiratory Failure    HPI:  89 yo M PMH recently diagnosed esophageal cancer, aspirations and vocal cord paralysis who presented to the ED for 1 day of worsening dyspnea. Pt recently presented to [facility] on 08/08 for 3 months of hoarseness and difficulty swallowing, evaluated by ENT, found to have esophageal mass/cancer with R vocal cord paralysis. This AM pt presented to the ED for worsening shortness of breath and increasing lethargy/fatigue. On arrival pt was found to be hypoxic to 90% breathing at 27/min. BiPAP was initiated however pt became more lethargic w/ AMS and worsening CO2 retention. Pt was intubated and admitted to the MICU.     In the ED:  Initial vital signs: T: 98.3 HR: 101, BP: 191/98, R: 27, SpO2: 90% on RA  Labs: significant for WBC 6.2, Hgb 9.1, Trop 256 -> 194, Na 141, K 4.0, Cr 1.31, lactate 1.8, BNP 1780, VpH 7.29 -> 7.08 -> 7.17  Imaging: CTA chest: New, bibasilar patchy lung opacities since prior CT chest 8/8/2023, most suggestive of pneumonia. Stable upper thoracic esophageal mass and stable lymphadenopathy, likely metastatic. Negative for pulmonary embolus.  EKG: NSR, LBBB   Medications: CTX 2g,  Azithro 500, Mg 2g, Solumedrol 125mg, duonebs    In the MICU patient arrived intubated on propofol, in NAD, and was transitioned to zosyn.   Patient's family was called to let them know pt was in the hospital and to collect collateral on the patient. The patient's wife Shilpi noted that Mr. Caldera  was scheduled to get a biopsy of his left supraclavicular node that was noted to be enlarged, biopsy was scheduled for tomorrow 8/18. Spoke with Mr. Caldera's granddaughter as well who worked as a palliative care nurse, she stated that they had family discussions prior to this admission/event regarding Mr. Caldera's goals of care and health plans moving forward. She noted that Mr. Caldera believed he was "indio to be alive" and was open to continued discussions about how he wanted his life/health care plans to play out.  (17 Aug 2023 10:18)    SUBJECTIVE: Patient resting in bed, appears to have some SOB, son at bedside. Goal is for patient to go home with hospice and have no more additional testing.,     ROS:  DYSPNEA (Linda): 1  NAUS/VOM: N	  SECRETIONS: N	  AGITATION: N  Pain (Y/N):     N  -Provocation/Palliation: n/a   -Quality/Quantity: n/a   -Radiating: n/a   -Severity: n/a   -Timing/Frequency: n/a    -Impact on ADLs: n/a     OTHER REVIEW OF SYSTEMS: + weakness    UNABLE TO OBTAIN  due to: n/a     PEx:  T(C): 36.3 (08-24-23 @ 10:56), Max: 37.2 (08-23-23 @ 14:10)  HR: 86 (08-24-23 @ 10:56) (79 - 97)  BP: 158/85 (08-24-23 @ 10:56) (150/71 - 165/85)  RR: 18 (08-24-23 @ 10:56) (18 - 20)  SpO2: 100% (08-24-23 @ 10:56) (96% - 100%)  Wt(kg): 78.9kg    GENERAL:  [x ]Alert  [ x]Oriented x  3 [ ]Lethargic   [ ]Cachexia [x ]Verbal  [ ]Non-Verbal  Behavioral:   [ ] Anxiety  [ ] Delirium [ ] Agitation [x ] Other - calm   HEENT:  [ ]Normal   [x ]Dry mouth   [ ]ET Tube  [ ]Oral lesions   PULMONARY:   [ x]Clear  [ ]Tachypnea  [ ]Audible excessive secretions     [ ]Rhonchi     [ ]Right [ ]Left [ ]Bilateral  [ ]Crackles        [ ]Right [ ]Left [ ]Bilateral  [ ]Wheezing     [ ]Right [ ]Left [ ]Bilateral  CARDIOVASCULAR:    [x ]Regular [ ]Irregular [ ]Tachy  [ ]Tree [ ]Murmur [ ]Other  GASTROINTESTINAL:  [ x]Soft  [ ]Distended   [ ]+BS  [x ]Non tender [ ]Tender  [ ]PEG [ ]OGT/NGT  [] flexiseal with output  Last BM:   GENITOURINARY:  [x ]Normal [ ] Incontinent   [ ]Oliguria/Anuria   [ ]Mulligan  MUSCULOSKELETAL:   [ ]Normal   [x ]Weakness  [ ]Bed/Wheelchair bound [ ]Edema  NEUROLOGIC:   [x ]No focal deficits  [ ] Cognitive impairment  [ ] Dysphagia [ ]Dysarthria [ ] Paresis [  ]Other   SKIN:   [ x]Normal   [ ]Pressure ulcer(s)  [ ]Rash     ALLERGIES: No Known Allergies    OPIATE NAÏVE (Y/N): Y    MEDICATIONS: REVIEWED  MEDICATIONS  (STANDING):  albuterol/ipratropium for Nebulization 3 milliLiter(s) Nebulizer every 6 hours  atenolol  Tablet 50 milliGRAM(s) Oral every 12 hours  enalaprilat Injectable 5 milliGRAM(s) IV Push every 6 hours  iohexol 300 mG (iodine)/mL Oral Solution 30 milliLiter(s) Oral once  methylPREDNISolone sodium succinate Injectable 20 milliGRAM(s) IV Push every 12 hours  pantoprazole   Suspension 40 milliGRAM(s) Oral daily    MEDICATIONS  (PRN):  HYDROmorphone  Injectable 0.5 milliGRAM(s) IV Push every 1 hour PRN Dyspnea, Increased work of breathing, anxiety    LABS: REVIEWED  CBC:                        8.9    8.11  )-----------( 131      ( 24 Aug 2023 07:58 )             29.8     CMP:    08-24    144  |  108  |  26<H>  ----------------------------<  129<H>  4.2   |  29  |  1.06    Ca    8.8      24 Aug 2023 07:58  Phos  2.5     08-24  Mg     2.3     08-24    IMAGING: REVIEWED    ADVANCED DIRECTIVES:            DNR DNI            MOLST      DECISION MAKER:  Patient is able to make decisions for himself   LEGAL SURROGATE:Wife/Caregiver:   Shilpi Caldera  Phone: [phone]    Granddaughter/Emergency Contact (lives in [place]): Nanette Soto  Phone: [phone]    Son (lives in [place]): Carlos Caldera  Phone: [phone]    Brother (lives in [place]): Britton Caldera  Phone: [phone]    Granddaughter (lives in [place]): Agnieszka Cuadra  Phone: [phone]    PSYCHOSOCIAL-SPIRITUAL ASSESSMENT:       Reviewed       Care plan unchanged    GOALS OF CARE DISCUSSION       Palliative care info/counseling provided	           Family meeting       Advanced Directives addressed please see Advance Care Planning Note	           See previous Palliative Medicine Note       Documentation of GOC: DNR/DNI  	      AGENCY CHOICE DISCUSSED:     Home hospice         REFERRALS:	        Palliative Med        Hospice       PT/OT

## 2023-08-24 NOTE — PROGRESS NOTE ADULT - SUBJECTIVE AND OBJECTIVE BOX
OVERNIGHT EVENTS: CAMRYN, VSS.    SUBJECTIVE / INTERVAL HPI: Patient seen and examined at bedside. Seen by rad onc yesterday with discussion for outpatient radiation. Liberalized to liquid diet per patient wishes (understands aspiration risk). Able to tolerate orange juice, ice cream. Planned for CTAP with IV and oral contrast today (will discuss with radiology if possible to concentrate oral contrast). Continuing with steroid taper.     Spoke to pharmacy, all med pills are able to be crushed including: atenolol, amitriptyline diazepam, losartan, methylprednisone.       PHYSICAL EXAM:    General: Well-appearing, NAD. Hoarse voice  HEENT: NC/AT; PERRL, anicteric sclera; MMM  Neck: Large frontal neck mass  Cardiovascular: +S1/S2; RRR  Respiratory: Mild inspiratory stridor. CTA B/L; no W/R/R  Gastrointestinal: soft, NT/ND; +BSx4  Extremities: WWP; no edema, clubbing or cyanosis  Vascular: 2+ radial, DP/PT pulses B/L  Neurological: AAOx3; no focal deficits    VITAL SIGNS:  Vital Signs Last 24 Hrs  T(C): 36.4 (24 Aug 2023 05:31), Max: 37.2 (23 Aug 2023 14:10)  T(F): 97.6 (24 Aug 2023 05:31), Max: 99 (23 Aug 2023 14:10)  HR: 85 (24 Aug 2023 05:31) (79 - 97)  BP: 165/85 (24 Aug 2023 05:31) (150/71 - 169/81)  BP(mean): 99 (23 Aug 2023 12:05) (99 - 99)  RR: 18 (24 Aug 2023 05:31) (18 - 20)  SpO2: 100% (24 Aug 2023 05:31) (95% - 100%)    Parameters below as of 23 Aug 2023 21:30  Patient On (Oxygen Delivery Method): room air          MEDICATIONS:  MEDICATIONS  (STANDING):  albuterol/ipratropium for Nebulization 3 milliLiter(s) Nebulizer every 6 hours  dextrose 5% + lactated ringers. 1000 milliLiter(s) (60 mL/Hr) IV Continuous <Continuous>  enalaprilat Injectable 2.5 milliGRAM(s) IV Push every 6 hours  iohexol 300 mG (iodine)/mL Oral Solution 30 milliLiter(s) Oral once  methylPREDNISolone sodium succinate Injectable 20 milliGRAM(s) IV Push every 12 hours    MEDICATIONS  (PRN):  HYDROmorphone  Injectable 0.5 milliGRAM(s) IV Push every 1 hour PRN Dyspnea, Increased work of breathing, anxiety      ALLERGIES:  Allergies    No Known Allergies    Intolerances        LABS:                        8.9    8.11  )-----------( 131      ( 24 Aug 2023 07:58 )             29.8     08-24    144  |  108  |  26<H>  ----------------------------<  129<H>  4.2   |  29  |  1.06    Ca    8.8      24 Aug 2023 07:58  Phos  2.5     08-24  Mg     2.3     08-24        Urinalysis Basic - ( 24 Aug 2023 07:58 )    Color: x / Appearance: x / SG: x / pH: x  Gluc: 129 mg/dL / Ketone: x  / Bili: x / Urobili: x   Blood: x / Protein: x / Nitrite: x   Leuk Esterase: x / RBC: x / WBC x   Sq Epi: x / Non Sq Epi: x / Bacteria: x      CAPILLARY BLOOD GLUCOSE          RADIOLOGY & ADDITIONAL TESTS: Reviewed. OVERNIGHT EVENTS: CAMRYN, VSS.    SUBJECTIVE / INTERVAL HPI: Patient seen and examined at bedside. Seen by rad onc yesterday with discussion for outpatient radiation. Liberalized to liquid diet per patient wishes (understands aspiration risk). Able to tolerate orange juice, ice cream. Planned for CTAP with IV and oral contrast today (will discuss with radiology if possible to concentrate oral contrast). Continuing with steroid taper.     Spoke to pharmacy, all med pills are able to be crushed including: atenolol, amitriptyline diazepam, losartan, methylprednisone.     PHYSICAL EXAM:    General: Well-appearing, NAD. Hoarse voice  HEENT: NC/AT; PERRL, anicteric sclera; MMM  Neck: Large frontal neck mass  Cardiovascular: +S1/S2; RRR  Respiratory: Mild inspiratory stridor. CTA B/L; no W/R/R  Gastrointestinal: soft, NT/ND; +BSx4  Extremities: WWP; no edema, clubbing or cyanosis  Vascular: 2+ radial, DP/PT pulses B/L  Neurological: AAOx3; no focal deficits    VITAL SIGNS:  Vital Signs Last 24 Hrs  T(C): 36.4 (24 Aug 2023 05:31), Max: 37.2 (23 Aug 2023 14:10)  T(F): 97.6 (24 Aug 2023 05:31), Max: 99 (23 Aug 2023 14:10)  HR: 85 (24 Aug 2023 05:31) (79 - 97)  BP: 165/85 (24 Aug 2023 05:31) (150/71 - 169/81)  BP(mean): 99 (23 Aug 2023 12:05) (99 - 99)  RR: 18 (24 Aug 2023 05:31) (18 - 20)  SpO2: 100% (24 Aug 2023 05:31) (95% - 100%)    Parameters below as of 23 Aug 2023 21:30  Patient On (Oxygen Delivery Method): room air          MEDICATIONS:  MEDICATIONS  (STANDING):  albuterol/ipratropium for Nebulization 3 milliLiter(s) Nebulizer every 6 hours  dextrose 5% + lactated ringers. 1000 milliLiter(s) (60 mL/Hr) IV Continuous <Continuous>  enalaprilat Injectable 2.5 milliGRAM(s) IV Push every 6 hours  iohexol 300 mG (iodine)/mL Oral Solution 30 milliLiter(s) Oral once  methylPREDNISolone sodium succinate Injectable 20 milliGRAM(s) IV Push every 12 hours    MEDICATIONS  (PRN):  HYDROmorphone  Injectable 0.5 milliGRAM(s) IV Push every 1 hour PRN Dyspnea, Increased work of breathing, anxiety      ALLERGIES:  Allergies    No Known Allergies    Intolerances        LABS:                        8.9    8.11  )-----------( 131      ( 24 Aug 2023 07:58 )             29.8     08-24    144  |  108  |  26<H>  ----------------------------<  129<H>  4.2   |  29  |  1.06    Ca    8.8      24 Aug 2023 07:58  Phos  2.5     08-24  Mg     2.3     08-24        Urinalysis Basic - ( 24 Aug 2023 07:58 )    Color: x / Appearance: x / SG: x / pH: x  Gluc: 129 mg/dL / Ketone: x  / Bili: x / Urobili: x   Blood: x / Protein: x / Nitrite: x   Leuk Esterase: x / RBC: x / WBC x   Sq Epi: x / Non Sq Epi: x / Bacteria: x      CAPILLARY BLOOD GLUCOSE          RADIOLOGY & ADDITIONAL TESTS: Reviewed.

## 2023-08-24 NOTE — PROGRESS NOTE ADULT - ASSESSMENT
89 yo M PMH recently diagnosed esophageal cancer, aspirations, and vocal cord paralysis presented to the ED for 1 day of worsening dyspnea, found to be hypoxic to 90% w/ AMS and worsening CO2 retention, subsequently intubated and admitted to the MICU, now extubated. S/p LN biopsy with metastatic carcinoma, now made DNR/DNI undergoing continued GOC re: palliative treatment discussions vs. home hospice.

## 2023-08-24 NOTE — PROGRESS NOTE ADULT - PROBLEM SELECTOR PLAN 7
F: po  E: Replete as necessary K>4 Mg>2  N: Full liquids  DVT Prophylaxis: None  GI prophylaxis: None   CODE STATUS: DNR/DNI

## 2023-08-24 NOTE — PROGRESS NOTE ADULT - SUBJECTIVE AND OBJECTIVE BOX
Physical Medicine and Rehabilitation Progress Note :       Patient is a 89y old  Male who presents with a chief complaint of Acute Hypoxic Respiratory Failure (24 Aug 2023 12:36)      HPI:  89 yo M PMH recently diagnosed esophageal cancer, aspirations and vocal cord paralysis who presented to the ED for 1 day of worsening dyspnea. Pt recently presented to [facility] on 08/08 for 3 months of hoarseness and difficulty swallowing, evaluated by ENT, found to have esophageal mass/cancer with R vocal cord paralysis. This AM pt presented to the ED for worsening shortness of breath and increasing lethargy/fatigue. On arrival pt was found to be hypoxic to 90% breathing at 27/min. BiPAP was initiated however pt became more lethargic w/ AMS and worsening CO2 retention. Pt was intubated and admitted to the MICU.     In the ED:  Initial vital signs: T: 98.3 HR: 101, BP: 191/98, R: 27, SpO2: 90% on RA  Labs: significant for WBC 6.2, Hgb 9.1, Trop 256 -> 194, Na 141, K 4.0, Cr 1.31, lactate 1.8, BNP 1780, VpH 7.29 -> 7.08 -> 7.17  Imaging: CTA chest: New, bibasilar patchy lung opacities since prior CT chest 8/8/2023, most suggestive of pneumonia. Stable upper thoracic esophageal mass and stable lymphadenopathy, likely metastatic. Negative for pulmonary embolus.  EKG: NSR, LBBB   Medications: CTX 2g,  Azithro 500, Mg 2g, Solumedrol 125mg, duonebs    In the MICU patient arrived intubated on propofol, in NAD, and was transitioned to zosyn.   Patient's family was called to let them know pt was in the hospital and to collect collateral on the patient. The patient's wife Shilpi noted that Mr. Frances  was scheduled to get a biopsy of his left supraclavicular node that was noted to be enlarged, biopsy was scheduled for tomorrow 8/18. Spoke with Mr. Frances's granddaughter as well who worked as a palliative care nurse, she stated that they had family discussions prior to this admission/event regarding Mr. Frances's goals of care and health plans moving forward. She noted that Mr. Frances believed he was "indio to be alive" and was open to continued discussions about how he wanted his life/health care plans to play out.  (17 Aug 2023 10:18)                            8.9    8.11  )-----------( 131      ( 24 Aug 2023 07:58 )             29.8       08-24    144  |  108  |  26<H>  ----------------------------<  129<H>  4.2   |  29  |  1.06    Ca    8.8      24 Aug 2023 07:58  Phos  2.5     08-24  Mg     2.3     08-24      Vital Signs Last 24 Hrs  T(C): 36.4 (24 Aug 2023 12:43), Max: 37.2 (23 Aug 2023 14:10)  T(F): 97.6 (24 Aug 2023 12:43), Max: 99 (23 Aug 2023 14:10)  HR: 70 (24 Aug 2023 12:43) (70 - 97)  BP: 172/86 (24 Aug 2023 12:43) (150/71 - 172/86)  BP(mean): --  RR: 18 (24 Aug 2023 12:43) (18 - 20)  SpO2: 98% (24 Aug 2023 12:43) (96% - 100%)    Parameters below as of 24 Aug 2023 12:43  Patient On (Oxygen Delivery Method): room air        MEDICATIONS  (STANDING):  albuterol/ipratropium for Nebulization 3 milliLiter(s) Nebulizer every 6 hours  atenolol  Tablet 50 milliGRAM(s) Oral every 12 hours  enalaprilat Injectable 5 milliGRAM(s) IV Push every 6 hours  iohexol 300 mG (iodine)/mL Oral Solution 30 milliLiter(s) Oral once  methylPREDNISolone sodium succinate Injectable 20 milliGRAM(s) IV Push every 12 hours  pantoprazole   Suspension 40 milliGRAM(s) Oral daily    MEDICATIONS  (PRN):  HYDROmorphone  Injectable 0.5 milliGRAM(s) IV Push every 1 hour PRN Dyspnea, Increased work of breathing, anxiety          Initial Functional Status Assessment :       Previous Level of Function:     · Ambulation Skills	independent  · Transfer Skills	independent  · ADL Skills	independent  · Work/Leisure Activity	independent  · Additional Comments	Prior to admission pt lives in an elevator building with family. Family at bedside reports that someone will be with pt 24/7 but they are also looking into A services. Pt reports having shower chair and commode. Prior to admission pt was Indep with ADLs and mobility without AD.    Cognitive Status Examination:   · Orientation	oriented to person, place, time and situation  · Level of Consciousness	alert  · Follows Commands and Answers Questions	100% of the time  · Personal Safety and Judgment	intact  · Short Term Memory	intact    Range of Motion Exam:   · Range of Motion Examination	bilateral lower extremity ROM was WFL (within functional limits); bilateral upper extremity ROM was WFL (within functional limits)    Manual Muscle Testing:   · Manual Muscle Testing Results	no strength deficits were identified    Bed Mobility: Rolling/Turning:     · Level of Jasper	independent    Bed Mobility: Sit to Supine:     · Level of [place]	supervision  · Physical Assist/Nonphysical Assist	1 person assist    Bed Mobility: Supine to Sit:     · Level of [place]asper	supervision  · Physical Assist/Nonphysical Assist	1 person assist    Transfer: Sit to Stand:     · Level of [place]	contact guard; minimum assist (75% patients effort)  · Physical Assist/Nonphysical Assist	1 person assist  · Weight-Bearing Restrictions	weight-bearing as tolerated  · Assistive Device	rolling walker    Transfer: Stand to Sit:     · Level of [place]	contact guard; minimum assist (75% patients effort)  · Physical Assist/Nonphysical Assist	1 person assist  · Weight-Bearing Restrictions	weight-bearing as tolerated  · Assistive Device	rolling walker    Gait Skills:     · Level of J[place]	contact guard  · Physical Assist/Nonphysical Assist	1 person assist  · Weight-Bearing Restrictions	weight-bearing as tolerated  · Assistive Device	rolling walker  · Gait Distance	25 feet    Gait Analysis:     · Gait Pattern Used	3-point gait  · Gait Deviations Noted	increased time in double stance; decreased velocity of limb motion; decreased step length; decreased stride length; decreased weight-shifting ability  · Impairments Contributing to Gait Deviations	impaired balance; impaired motor control; impaired postural control; decreased strength    Stair Negotiation:     · Level of Jasper	unable to perform    Balance Skills Assessment:     · Sitting Balance: Static	good balance  · Sitting Balance: Dynamic	good balance  · Standing Balance: Static	good balance  · Standing Balance: Dynamic	fair balance  · Systems Impairment Contributing to Balance Disturbance	musculoskeletal  · Identified Impairments Contributing to Balance Disturbance	impaired motor control; decreased strength; impaired postural control    Sensory Examination:   Sensory Examination:    Grossly Intact:   · Gross Sensory Examination	Grossly Intact      Clinical Impressions:   · Criteria for Skilled Therapeutic Interventions	impairments found; rehab potential; anticipated equipment needs at discharge; therapy frequency; functional limitations in following categories; anticipated discharge recommendation; predicted duration of therapy intervention; risk reduction/prevention  · Impairments Found (describe specific impairments)	aerobic capacity/endurance; gait, locomotion, and balance; muscle strength; posture; ergonomics and body mechanics; gross motor; joint integrity and mobility  · Functional Limitations in Following Categories (describe specific limitations)	self-care; home management; community/leisure  · Risk Reduction/Prevention (Describe Specific Areas of risk reduction/prevention)	risk factors  · Risk Areas	fall            PM&R Impression : as above    Current Disposition Plan Recommendations :     d/c home with home physical therapy , ?ome hospice

## 2023-08-24 NOTE — PROGRESS NOTE ADULT - ATTENDING COMMENTS
Patient seen, examined, and discussed with FRANTZ Barlow and Dr. Sánchez. Agree with above. 89M with a h/o hypertension, esophageal mass with lymphadenopathy concerning for malignancy, aspirations and vocal cord paralysis who presented to the ED for 1 day of worsening dyspnea. Patient recently presented to [facility] on 08/08 for 3 months of hoarseness and difficulty swallowing, evaluated by ENT, found to have esophageal mass with R vocal cord paralysis. Presented to the ED on 8/17 for worsening shortness of breath. Found to be hypoxic and altered, CT showing new, bibasilar patchy lung opacities since prior CT chest 8/8/2023, most suggestive of pneumonia. He was intubated and admitted to MICU for acute hypoxic respiratory failure, likely secondary to aspiration pneumonia. He is now s/p extubation and solumedrol with improvement of respiratory status. GI consulted for further evaluation and assessment of treatment options for dysphagia. S/p EGD with no esophageal mass visualized. Liberalized diet with knowledge of aspiration risk. Appreciate Rad onc input. We will sign off, please call back with questions or concerns.     Denis Martinez MD  Gastroenterology

## 2023-08-24 NOTE — PROGRESS NOTE ADULT - PROBLEM SELECTOR PLAN 3
ENT following  #Inspiratory stridor  Likely 2/2 progressive vocal cord compression vs VCD vs post extubation edema. Laryngoscope shows known right sided vocal paresis w/ possible left sided edema 2/2 intubation    Plan:   - c/w methylprednisone 20mg IV BID, taper by 10mg a day (can transition to liquid prednisone outpatient)

## 2023-08-24 NOTE — PROGRESS NOTE ADULT - ASSESSMENT
89 yo M PMH hypertension, esophageal mass with lymphadenopathy concerning for malignancy, aspirations and vocal cord paralysis who presented to the ED for 1 day of worsening dyspnea. Patient recently presented to [facility] on 08/08 for 3 months of hoarseness and difficulty swallowing, evaluated by ENT, found to have esophageal mass with R vocal cord paralysis. Presented to the ED on 8/17 for worsening shortness of breath. Found to be hypoxic and altered, CT showing new, bibasilar patchy lung opacities since prior CT chest 8/8/2023, most suggestive of pneumonia. He was intubated and admitted to MICU for acute hypoxic respiratory failure, likely secondary to aspiration pneumonia. He is now s/p extubation and solumedrol with improvement of respiratory status. GI consulted for further evaluation and assessment of treatment options for dysphagia.    On EGD yesterday, no esophageal mass visualized. There was slight external compression, although this is not causing appreciable esophageal narrowing.    EGD (8/23/23):  - There was no esophageal mass visualized. There was slight external compression at approximately 24-26 cm from the incisors, but this did not cause any appreciable narrowing or difficulty in passing the scope. There was a small erosion at the site of compression. There was also a mucus like substance suggestive of stasis in the lower esophagus, possibly related to dysmotility.   - Mild gastritis  - Mild duodenitis     Recommendations:  -GOC discussion  -F/u CT    GI signing off. Please reconsult as needed or with any questions.

## 2023-08-24 NOTE — PROGRESS NOTE ADULT - PROVIDER SPECIALTY LIST ADULT
Heme/Onc
Heme/Onc
Internal Medicine
MICU
Gastroenterology
Internal Medicine
Palliative Care
Heme/Onc
Internal Medicine
Rehab Medicine
Internal Medicine
Internal Medicine
Palliative Care

## 2023-08-24 NOTE — PROGRESS NOTE ADULT - NUTRITIONAL ASSESSMENT
This patient has been assessed with a concern for Malnutrition and has been determined to have a diagnosis/diagnoses of Severe protein-calorie malnutrition.    This patient is being managed with:   Diet Full Liquid-  Entered: Aug 22 2023  9:13AM

## 2023-08-24 NOTE — PROGRESS NOTE ADULT - PROBLEM SELECTOR PLAN 6
pt with anemia on initial presentation to ED 8/8 and then again this visit. No signs of bleeding throughout hospitalization and appears stable for now, no need for immediate concern. DDx includes MELISSA, AOCD, Latest 8.9     Plan:  - CTM

## 2023-08-24 NOTE — PROGRESS NOTE ADULT - ASSESSMENT
I M    89 y o M PMH recently diagnosed esophageal cancer, aspirations, and vocal cord paralysis presented to the ED for 1 day of worsening dyspnea, found to be hypoxic to 90% w/ AMS and worsening CO2 retention, subsequently intubated and admitted to the MICU, now extubated. S/p LN biopsy with metastatic carcinoma, now made DNR/DNI undergoing continued GOC re: palliative treatment discussions vs. home hospice.    Problem/Plan - 1:  ·  Problem: Esophageal mass.   ·  Plan: New diagnosis, oncology f/u outpt had described a differential of (primary esophogeal, thyroid, lymphoma) and was scheduled for a L supraclavicular node biopsy on 8/18. Oncology consulted, verbally let us know that he has never had the mass or enlarged lymph nodes biopsied. A biopsy would be needed to determine route of care (if solid cancer would lean radiation, if lymphoma would lean medical/chemo management). Core biopsy IR procedure order from 08/18 cancelled 2/2 respiratory distress, patient now s/p core needle biopsy on 08/22, now consistent with metastatic LN+ carcinoma. EGD without visible mass.     Plan:   - palliative, GI, and heme/onc on-board, appreciate recs   - radiation oncology consulted re: palliative RT options  - ongoing GOC discussions with family.    Problem/Plan - 2:  ·  Problem: Esophageal dysphagia.   ·  Plan: Pt presented on 8/8 with complaints of difficulty swallowing, as a result of his neck mass. He was discharged on thick liquids. He then presented again on this latest admission with B/L aspiration pneumonia. S&S recommended NPO diet.     Plan  - Pt kept NPO per speech & swallow rec   - If advancing to full liquid diet, will need to be comfort feeds given risk of aspiration  - currently kept on IV fluids (D5W + LR @ 60cc/hr).    Problem/Plan - 3:  ·  Problem: Vocal cord paresis.   ·  Plan: ENT following  #Inspiratory stridor  Likely 2/2 progressive vocal cord compression vs VCD vs post extubation edema. Laryngoscope shows known right sided vocal paresis w/ possible left sided edema 2/2 intubation    Plan:   - c/w methylprednisone 40mg IV BID, taper starting 8/24.    Problem/Plan - 4:  ·  Problem: Acute respiratory failure with hypoxia.   ·  Plan: Possibly 2/2 aspiration PNA vs. neck mass effect on trachea. CT pointing towards most likely bilateral aspiration PNA, but cannot r/o increased mass effect compromising airway.    Plan:   - c/w ceftriaxone 2g IV q24 through 8/23.    Problem/Plan - 5:  ·  Problem: HTN (hypertension).   ·  Plan: On home atenolol, currently being held. SBP consistently 170s-180s    Plan:   - Pt unable to tolerate PO  - Increased to IV enalipril 2.5mg q6 for persistenty elevated BPs.    Problem/Plan - 6:  ·  Problem: Anemia.   ·  Plan: pt with anemia on initial presentation to ED 8/8 and then again this visit. No signs of bleeding throughout hospitalization and appears stable for now, no need for immediate concern. DDx includes MELISSA, AOCD, Latest 8.9     Plan:  -active T&S.    Problem/Plan - 7:  ·  Problem: BRETT (acute kidney injury).   ·  Plan: Can't rule out transient intrinsic insult s/p CTA with contrast. However FeNa 0.1% suggesting pre renal etiology possibly 2/2 to poor PO intake prior to admission. Cr normalized on 08/22 to 1.29. RESOLVED.    Plan:   - CTM BUN/Cr   - avoid nephrotoxic meds if able.    Problem/Plan - 8:  ·  Problem: Nutrition, metabolism, and development symptoms.   ·  Plan: F: D5W + LR @ 60cc/hr  E: Replete as necessary K>4 Mg>2  N: Full liquids  DVT Prophylaxis: None  GI prophylaxis: None   CODE STATUS: DNR/DNI.

## 2023-08-24 NOTE — PROGRESS NOTE ADULT - TIME BILLING
dysphagia
Emotional Support/Supportive Care and Clarification of Potential Disease Trajectory related to Esophageal cancer   Education and Monitoring of Opiates including titration and management of high risk medications  Discharge Facilitation with ongoing coordination    Time inclusive of chart review, medication ordering, discussion with primary team, clinical documentation, and communication with family/caregiver.
Emotional Support/Supportive Care and Clarification of Potential Disease Trajectory related to Esophageal cancer   Education and Monitoring of Opiates including titration and management of high risk medications  Discharge Facilitation with ongoing coordination    Time inclusive of chart review, medication ordering, discussion with primary team, clinical documentation, and communication with family/caregiver.
REveiwed labs, imaging, hospital course. Discussed case with house staff, palliative, SW, GI, heme, IR. Lengthy history/phsyical. Palliative care/goals of care discussion. Decision regarding further testing/workup/hospitalization.
Reviewed labs, imaging, vitals. Discussed with house staff, GI, heme onc, rad onc, pathology. Lengthy GOC discussion with patient/family. Consideration for further inpatient managemnt.

## 2023-08-24 NOTE — PROGRESS NOTE ADULT - REASON FOR ADMISSION
Acute Hypoxic Respiratory Failure

## 2023-08-24 NOTE — PROGRESS NOTE ADULT - PROBLEM SELECTOR PLAN 4
Possibly 2/2 aspiration PNA vs. neck mass effect on trachea. CT pointing towards most likely bilateral aspiration PNA, but cannot r/o increased mass effect compromising airway. Finished CTX course. Currently on RA.    Plan:   - CTM

## 2023-08-25 NOTE — DISCHARGE NOTE NURSING/CASE MANAGEMENT/SOCIAL WORK - NSDCPEFALRISK_GEN_ALL_CORE
For information on Fall & Injury Prevention, visit: [email or web address] OR  [email or web address] OR  https://www.cdc.gov/steadi/patient.html

## 2023-08-25 NOTE — DISCHARGE NOTE NURSING/CASE MANAGEMENT/SOCIAL WORK - PATIENT PORTAL LINK FT
You can access the FollowMyHealth Patient Portal offered by [facility] by registering at the following website: [email or web address]. By joining [employer]’s FollowMyHealth portal, you will also be able to view your health information using other applications (apps) compatible with our system.

## 2023-08-25 NOTE — PHARMACY COMMUNICATION NOTE - COMMENTS
Reviewed discharge meds with Dr. Rothman      Patient restarted on home medications (see pharmacy communication note- admission med rec)    MD added 2 new medications:   Pantoprazole Powder for suspension 40mg PO daily   Prednisone Solution 10mg PO BID (for 2 doses)    Palliative care added pain meds: morphine solution 
Primary Pharmacy correct (updated)    Medications  Albuterol 90mcg; 2 puffs every 4 hours as needed  Amitriptyline 10mg; 1 tablet by mouth twice daily  Atenolol 50mg; 1 tablet by mouth twice daily  Diazepam 5mg; 1 tablet by mouth every night as needed  Symbicort 160/4.5; 2 puffs by mouth twice daily  Dorzolamide-Timolol 2%/0.5% eyedrops; 1 drop each eye every morning and night time  Pilocarpine 4% eyedrops - 1 drop in the left eye 4 times daily (every morning, afternoon, night, and at bedtime)  Losartan 100mg; 1 tablet by mouth daily    Source of Information (Patient, Pharmacy)

## 2023-08-31 NOTE — H&P ADULT - PROBLEM SELECTOR PLAN 1
esophageal cancer, aspirations and vocal cord paralysis who presented to the ED for 3 day of worsening dyspnea and Shortness of breathe. Patient is presenting from home hospice, where he was no longer able to be managed. Patient has wheezing and was placed on BiPAP, Granddughter Ivory at bedside. Plan for full comfort care and to take off the BiPap mask once the brother gets to the hospital. Plan is symptom directed care. Pt with known hx of esophageal cancer, aspiration, vocal cord paralysis, presenting from home hospice for worsening stridor not able to be managed from home. Wheezing on arrival, received duonebs and placed on BiPAP. Per family, will withdraw BiPAP when family is able to say their goodbyes. Kwaku Aaron at bedside, along with several other family members.  - palliative care consulted, appreciate recs  - DNR/DNI  - symptom directed care

## 2023-08-31 NOTE — CONSULT NOTE ADULT - PROBLEM SELECTOR RECOMMENDATION 9
Patient in respiratory distress secondary to his Esophageal mass. Currently on BIPAP with plan to remove for comfort once patient brother arrive.  Please initiate MSIR 2mg q4H IV ATC with MSIR 2mg q1h IV PRN. Patient in respiratory distress secondary to his Esophageal mass. Currently on BIPAP with plan to remove for comfort once patient brother and other family members arrive.  Please initiate MSIR 2mg q4H IV ATC with MSIR 2mg q1h IV PRN.

## 2023-08-31 NOTE — H&P ADULT - HISTORY OF PRESENT ILLNESS
CC: "  HPI  Denies fevers, chills, cough, chest pain, dyspnea, nausea, headache, diarrhea, sick contactschange in urinary or bowel habits      In the ED:    - VS: Tmax: , HR:  , BP:  , RR:  , O2:    %     - Pertinent Labs:     - Imaging: CXR: CT: US: Cath: EKG:     - Treatment/interventions:     PMHx:   PSHx:  Meds: See med rec  Allergies:  Social: see below      89 yo M [facility] esophageal cancer, aspirations and vocal cord paralysis who was recently discharged from in hospital hospice to [facility] presents to the ED for 1 day of worsening stridor. Evaluated by ENT, found to have esophageal mass/cancer with R vocal cord paralysis, pt was intubated during his last visit.  This AM pt was noted to have worsening stridor and team at [facility] could not make him comfortable. On route to ED by EMS pt received Morphine  2mg IM, morphine 4 mg SQ, Dex 10 mg, Versed 1 mg. Pt still in distress with increase work of breathing upon arrival to the ED. Patient presents with a MOLST form.  Discussed MOST form with patient's niece who was present by the bedside.  Patient is DNR no intubation.  Comfort measures only.    In the ED:    - VS: Tmax: , HR:  , BP:  , RR:  , O2:    %     - Pertinent Labs:     - Imaging: CXR: CT: US: Cath: EKG:     - Treatment/interventions:     PMHx:   PSHx:  Meds: See med rec  Allergies:  Social: see below      Obtained by ED chart review:    89 yo M PMH esophageal cancer, aspirations and vocal cord paralysis who was recently discharged from in hospital hospice to [facility] presents to the ED for 1 day of worsening stridor. Evaluated by ENT, found to have esophageal mass/cancer with R vocal cord paralysis, pt was intubated during his last visit. This AM pt was noted to have worsening stridor and team at [facility] could not make him comfortable. On route to ED by EMS, pt received Morphine 2mg IM, morphine 4 mg SQ, Dex 10 mg, Versed 1 mg. Pt still in distress with increase work of breathing upon arrival to the ED. Patient presents with a MOLST form.  Discussed MOST form with patient's niece who was present by the bedside.  Patient is DNR no intubation.  Comfort measures only.    In the ED:    - VS: Tmax: 94, HR: 80, BP: 138/94, RR: 18, O2: 96% 2LNC     - Pertinent Labs: none    - Imaging: none    - Treatment/interventions: duoneb x3, versed 2mg, morphine 4mg + 2mg    - Consult: Palliative care

## 2023-08-31 NOTE — H&P ADULT - PROBLEM SELECTOR PLAN 2
- palliative following, appreciate recs    "Discussion had with granddaughter and as he was home with hospice and was still short of breathe, she does not believe that he will be able to come home. She does not want to prolong his suffering but would like his family to say good bye and they are on the way in. She understands that the BIPAP mask is keeping him alive at this time, and would like to ultimately remove it once the family arrives to say there goodbyes. She confirmed that patient remains a DNR/DNI." Per palliative note, "discussion had with granddaughter and as he was home with hospice and was still short of breathe, she does not believe that he will be able to come home. She does not want to prolong his suffering, but would like his family to say good bye and they are on the way in. She understands that the BIPAP mask is keeping him alive at this time, and would like to ultimately remove it once the family arrives to say there goodbyes. She confirmed that patient remains a DNR/DNI."    - palliative following, appreciate recs Per palliative note, "discussion had with granddaughter and as he was home with hospice and was still short of breathe, she does not believe that he will be able to come home. She does not want to prolong his suffering, but would like his family to say good bye and they are on the way in. She understands that the BIPAP mask is keeping him alive at this time, and would like to ultimately remove it once the family arrives to say there goodbyes. She confirmed that patient remains a DNR/DNI."  - palliative following, appreciate recs  - symptom directed care        - dulcolax 10mg suppository daily PRN constipation        - Ativan 0.5mg IV q4h PRN anxiety        - morphine 2mg IV q4h standing        - morphine 1mg IV q1h PRN tachypnea/sob

## 2023-08-31 NOTE — CONSULT NOTE ADULT - ASSESSMENT
89 year old man    Recommend patient get MSIR 2mg q1h IV PRN as well as MSIR 2mg IV q4h ATC.  Patient remains a DNR/DNI 89 year old man with respiratory failure, functional quadriplegia, Esophageal Cancer and encounter for palliative care       Recommend patient get MSIR 2mg q1h IV PRN as well as MSIR 2mg IV q4h ATC.  Patient remains a DNR/DNI 89 year old man with respiratory failure, functional quadriplegia, Esophageal Cancer, advance care planning and encounter for palliative care.

## 2023-08-31 NOTE — CONSULT NOTE ADULT - PROBLEM SELECTOR RECOMMENDATION 5
Patient remains a DNR/DNI. Please see GOC above for details. Plan for symptom directed care and to remove BIPAP once his brother and other family members arrive. Will continue to manage symptoms.   As discussed during the palliative IDT meeting and as identified during the patients PSSA screening the patient would benefit from: Chaplaincy  - Amish/Spiritual practice:  Evangelical   - Coping: [ ] well [ ] with difficulty [ ] poor coping [x] unable to obtain  - Support system: [ x] strong [ ] adequate [ ] inadequate  - All questions answered, emotional support provided  -  primary team   - Please contact Palliative Medicine 24/7 at [phone] for any acute symptoms or further questions  - Will continue to follow with you

## 2023-08-31 NOTE — ED ADULT NURSE NOTE - OBJECTIVE STATEMENT
pt arrived to the ER from hospice for SOB and hypoxia. Pt arrived via EMS with granddaughter. Pt moved to room with granddaughter and placed on BIPAP. Pt is noted to be DNR/DNI. MD at bedside. IV access established. pt arrived to the ER from hospice for SOB. stridor and hypoxia for the past 1xday. Pt arrived via EMS with granddaughter. Pt moved to room with granddaughter and placed on BIPAP. Pt is noted to be DNR/DNI. MD at bedside. IV access established. pt arrived to the ER from hospice for SOB. stridor and hypoxia for the past 1xday. Pt arrived via EMS with granddaughter. Pt moved to room with granddaughter and placed on BIPAP. Pt is noted to be DNR/DNI. MD at bedside. IV access established. Grand daughter (mel rivero- [phone])

## 2023-08-31 NOTE — H&P ADULT - ATTENDING COMMENTS
Patient is 89 yo man with esophageal cancer, obstruction of airways, vocal cord paralysis, s/p recent discharge from inpatient hospice to home, admitted for progressive difficulty breathing and audible stridor. Patient's family opted for the patient to be comfortable without invasive interventions. Pt is DNR DNI.   ----cont with oxygen supplementation via NC  ----on MSIR 2mg q4H IV ATC with MSIR 2mg q1h IV PRN as per Palliative care team input  ----pt is in private room with [facility] visiting by family

## 2023-08-31 NOTE — ED ADULT TRIAGE NOTE - CHIEF COMPLAINT QUOTE
Pt on hospice due to throat ca, called hospice due to worsening stridor.  Pt received 2mg morphine IM, midazolam 1mg IM, dexamethasone 10mg IM and than another 4mg morphine IM with EMS.  As per EMS pt is DNR DNI.  As per EMS off O2 patient is 80% O2 saturation.    [facility], Dr Jony Avilez  requesting a phone call.

## 2023-08-31 NOTE — CONSULT NOTE ADULT - CONVERSATION DETAILS
In addition to the EM visit, an advance care planning meeting was performed  Start time: 215pm  End time: 235pm  Total time:  A face to face meeting to discuss advance care planning was held today regarding: CARLOS CALDERA  Primary decision maker: Patient is unable to make decisions for himself  Alternate/surrogate: Carlos (son)   Discussed advance directives including, but not limited to, healthcare proxy and code status.  Decision regarding code status: DNR/DNI  Documentation completed today: SARAVANAN in alpha    Discussion had with granddaughter and as he was home with hospice and was still short of breathe, she does not believe that he will be able to come home. She does not want to prolong his suffering but would like his family to say good bye and they are on the way in. She understands that the BIPAP mask is keeping him alive at this time, and would like to ultimately remove it once the family arrives to say there goodbyes. She confirmed that patient remains a DNR/DNI.

## 2023-08-31 NOTE — H&P ADULT - ATTENDING SUPERVISION STATEMENT
Roomed by: Nate Worley LPN    NPT to Dermatology    HPI: 10year old male presents for evaluation of rash to anal area and buttocks. Patient has been treated for anal strep and yeast infections. No other symptoms, aggravating factors, or treatments noted. ROS: besides positives in HPI all other systems negative. The allergy and medication lists were reviewed in the electronic medical record today. Patient has no alcohol history on file. Patient reports that  has never smoked. he has never used smokeless tobacco. Patient's family history is not on file. PE: General: alert, WDWN, no respiratory distress, normal affect  Eyes: no injection  Focused skin exam significant for: pink, scaly patch located on the perianal skin  Face(including eyelids and lips)/Neck/Hands: no lesions that appear malignant    A/P:  1.  Intertrigo  - new problem to provider requiring prescription drug management  - diagnosis and treatment options discussed with patient  - rx given for triamcinolone--nystatin (Mycolog)    RTC: 2 weeks    Electronically Signed by: Rosina Joe MD, 3/21/2019 Resident

## 2023-08-31 NOTE — H&P ADULT - NSHPPHYSICALEXAM_GEN_ALL_CORE
General: on BiPAP, in no acute distress  Neck: Trachea midline  Cardiac: RRR  Lungs: Equal chest rise  Neurological: Somnolent, unarousable  Skin: Warm and dry. No obvious rash

## 2023-08-31 NOTE — CONSULT NOTE ADULT - PROBLEM SELECTOR RECOMMENDATION 3
Patient is coming from home hospice for his known cancer. Patient in respiratory distress and is actively dying. Plan is for symptom directed care. Granddaughter in agreement who is at bedside.

## 2023-08-31 NOTE — CONSULT NOTE ADULT - PROBLEM SELECTOR RECOMMENDATION 4
Patient remains a DNR/DNI. Plan for symptom directed care and to remove BIPAP once his brother arrives. Will continue to manage symptoms.  As discussed during the palliative IDT meeting and as identified during the patients PSSA screening the patient would benefit from: Chaplaincy  - Congregation/Spiritual practice:  Judaism   - Coping: [ ] well [ ] with difficulty [ ] poor coping [x] unable to obtain  - Support system: [ x] strong [ ] adequate [ ] inadequate  - All questions answered, emotional support provided  -  primary team   - Please contact Palliative Medicine 24/7 at [phone] for any acute symptoms or further questions  - Will continue to follow with you 20 minutes was spent discussing advance care planning face to face the granddaughter.

## 2023-08-31 NOTE — CONSULT NOTE ADULT - SUBJECTIVE AND OBJECTIVE BOX
CARLOS CALDERA          MRN-[id number]            (10/7/1932)    HPI:  89 yo M PMH recently diagnosed esophageal cancer, aspirations and vocal cord paralysis who presented to the ED for 3 day of worsening dyspnea and Shortness of breathe. Patient is presenting from home hospice, where he was no longer able to be managed. Patient has wheezing and was placed on BiPAP, Graddughter Ivory at bedside. Plan for full comfort care and to take off the BiPap mask once the brother gets to the hospital. Plan is symptom directed care.     PAST MEDICAL & SURGICAL HISTORY:  HTN (hypertension)    History of intracranial aneurysm    Esophageal mass    FAMILY HISTORY: unable to obtain due to mental status        SOCIAL HISTORY:   unable to obtain due to mental status    ROS:    Unable to attain due to:  actively dying                      Dyspnea (Linda 0-10):  4                     N/V (Y/N):        N                      Secretions (Y/N) : N               Agitation(Y/N): N  Pain (Y/N):   N    -Provocation/Palliation: n/a   -Quality/Quantity:  n/a   -Radiating:  n/a   -Severity:  n/a   -Timing/Frequency:  n/a   -Impact on ADLs: n/a     General:  unable to obtain  HEENT:     unable to obtain  Neck:   unable to obtain  CVS:  unable to obtain  Resp:   unable to obtain  GI:  unable to obtain  :   unable to obtain  Musc:   unable to obtain  Neuro:   unable to obtain  Psych:  unable to obtain  Skin:  unable to obtain  Lymph:   unable to obtain    ALLERGIES:  Allergies    No Known Allergies    Intolerances    Opiate Naive (Y/N): Y  -iStop reviewed (Y/N): Y (Ref#:      [id number]      )  A	N	Y	O	2023	morphine sulf 100 mg/5 ml concMEDICATIONS:      MEDICATIONS  (STANDING):    MEDICATIONS  (PRN):      LABS:    CBC:    CMP:        IMAGING:  Reviewed    PHYSICAL EXAM:  T(C): --  HR: 92 (23 @ 12:39) (80 - 97)  BP: 182/84 (23 @ 12:39) (138/94 - 194/94)  RR: 24 (23 @ 12:39) (18 - 25)  SpO2: 95% (23 @ 12:39) (88% - 96%)  Weight (kg): 78.9 (23 Aug 2023 12:05)  Daily   CAPILLARY BLOOD GLUCOSE        I&O's Summary      GENERAL:  [ ]Alert  [ ]Oriented x   [x ]Lethargic   [ ]Cachexia [ ]Verbal  [ ]Non-Verbal  Behavioral:   [ ] Anxiety  [ ] Delirium [ ] Agitation [x ] Other - calm   HEENT:  [ ]Normal   [ ]Dry mouth   [ ]ET Tube  [ ]Oral lesions [x] bipap mask on   PULMONARY:   [ ]Clear  [x ]Tachypnea  [ ]Audible excessive secretions   [ ]Rhonchi     [ ]Right [ ]Left [ ]Bilateral  [ ]Crackles        [ ]Right [ ]Left [ ]Bilateral  [x ]Wheezing     [ ]Right [ ]Left [ ]Bilateral  CARDIOVASCULAR:    [ ]Regular [ ]Irregular [x ]Tachy  [ ]Tree [ ]Murmur [ ]Other  GASTROINTESTINAL:  [x ]Soft  [ ]Distended   [ ]+BS  [ x]Non tender [ ]Tender  [ ]PEG [ ]OGT/NGT  [] flexiseal with output  Last BM:   GENITOURINARY:  [ ]Normal [x ] Incontinent   [ ]Oliguria/Anuria   [ ]Mulligan  MUSCULOSKELETAL:   [ ]Normal   [ ]Weakness  [x ]Bed/Wheelchair bound [ ]Edema  NEUROLOGIC:   [ ]No focal deficits  [x ] Cognitive impairment  [ ] Dysphagia [ ]Dysarthria [ ] Paresis [  ]Other   SKIN:   [ x]Normal   [ ]Pressure ulcer(s)  [ ]Rash     Preadmit Karnofsky:  50%           Current Karnofsky:  10   %  Cachexia (Y/N): N  BMI (kg/m2): 28.1 (31 Aug 2023 11:43)    ADVANCED DIRECTIVES:     DNR/DNI     No documented HCP form found on Alpha     No Living will / POA / Advance directives found on [place] / Alpha.     [facility] notes on [facility]    DECISION MAKER: The patient is able to participate in symptomatic assessment but may not be able to make complex medical decisions  LEGAL SURROGATE: No documented HCP in paper chart / [place] / Alpha.  Alternate surrogate:   Wife/Caregiver: Shilpi Caldera  Phone: [phone]    Granddaughter/Emergency Contact (lives in [place]): Nanette Ariasuez  Phone: [phone]    Son (lives in [place]): Carlos Caldera  Phone: [phone]    Brother (lives in [place]): Britton Caldera  Phone: [phone]    Granddaughter (lives in [place]): Agnieszka Cuadra  Phone: [phone]      GOALS OF CARE DISCUSSION:       Palliative care info/counseling provided	           Family meeting       Advanced Directives addressed please see Advance Care Planning Note	           See previous Palliative Medicine Note       Documentation of GOC: 	          REFERRALS:	        Palliative Med        Unit SW/Case Mgmt              Speech/Swallow       Patient/Family Support       Massage Therapy       Music Therapy       Pet Therapy       Hospice       Nutrition       Dietician       PT/OT CARLOS CALDERA          MRN-[id number]            (10/7/1932)    HPI:  89 yo M PMH recently diagnosed esophageal cancer, aspirations and vocal cord paralysis who presented to the ED for 3 day of worsening dyspnea and Shortness of breathe. Patient is presenting from home hospice, where he was no longer able to be managed. Patient has wheezing and was placed on BiPAP, Granddughter Ivory at bedside. Plan for full comfort care and to take off the BiPap mask once the brother gets to the hospital. Plan is symptom directed care.     PAST MEDICAL & SURGICAL HISTORY:  HTN (hypertension)    History of intracranial aneurysm    Esophageal mass    FAMILY HISTORY: unable to obtain due to mental status        SOCIAL HISTORY:   unable to obtain due to mental status    ROS:    Unable to attain due to:  actively dying                      Dyspnea (Linda 0-10):  4                     N/V (Y/N):        N                      Secretions (Y/N) : N               Agitation(Y/N): N  Pain (Y/N):   N    -Provocation/Palliation: n/a   -Quality/Quantity:  n/a   -Radiating:  n/a   -Severity:  n/a   -Timing/Frequency:  n/a   -Impact on ADLs: n/a     General:  unable to obtain  HEENT:     unable to obtain  Neck:   unable to obtain  CVS:  unable to obtain  Resp:   unable to obtain  GI:  unable to obtain  :   unable to obtain  Musc:   unable to obtain  Neuro:   unable to obtain  Psych:  unable to obtain  Skin:  unable to obtain  Lymph:   unable to obtain    ALLERGIES:  Allergies    No Known Allergies    Intolerances    Opiate Naive (Y/N): Y  -iStop reviewed (Y/N): Y (Ref#:      [id number]      )  A	N	Y	O	2023	morphine sulf 100 mg/5 ml concMEDICATIONS:      MEDICATIONS  (STANDING):    MEDICATIONS  (PRN):      LABS:    CBC:    CMP:        IMAGING:  Reviewed    PHYSICAL EXAM:  T(C): --  HR: 92 (23 @ 12:39) (80 - 97)  BP: 182/84 (23 @ 12:39) (138/94 - 194/94)  RR: 24 (23 @ 12:39) (18 - 25)  SpO2: 95% (23 @ 12:39) (88% - 96%)  Weight (kg): 78.9 (23 Aug 2023 12:05)  Daily   CAPILLARY BLOOD GLUCOSE        I&O's Summary      GENERAL:  [ ]Alert  [ ]Oriented x   [x ]Lethargic   [ ]Cachexia [ ]Verbal  [ ]Non-Verbal  Behavioral:   [ ] Anxiety  [ ] Delirium [ ] Agitation [x ] Other - calm   HEENT:  [ ]Normal   [ ]Dry mouth   [ ]ET Tube  [ ]Oral lesions [x] bipap mask on   PULMONARY:   [ ]Clear  [x ]Tachypnea  [ ]Audible excessive secretions   [ ]Rhonchi     [ ]Right [ ]Left [ ]Bilateral  [ ]Crackles        [ ]Right [ ]Left [ ]Bilateral  [x ]Wheezing     [ ]Right [ ]Left [ ]Bilateral  CARDIOVASCULAR:    [ ]Regular [ ]Irregular [x ]Tachy  [ ]Tree [ ]Murmur [ ]Other  GASTROINTESTINAL:  [x ]Soft  [ ]Distended   [ ]+BS  [ x]Non tender [ ]Tender  [ ]PEG [ ]OGT/NGT  [] flexiseal with output  Last BM:   GENITOURINARY:  [ ]Normal [x ] Incontinent   [ ]Oliguria/Anuria   [ ]Mulligan  MUSCULOSKELETAL:   [ ]Normal   [ ]Weakness  [x ]Bed/Wheelchair bound [ ]Edema  NEUROLOGIC:   [ ]No focal deficits  [x ] Cognitive impairment  [ ] Dysphagia [ ]Dysarthria [ ] Paresis [  ]Other   SKIN:   [ x]Normal   [ ]Pressure ulcer(s)  [ ]Rash     Preadmit Karnofsky:  50%           Current Karnofsky:  10   %  Cachexia (Y/N): N  BMI (kg/m2): 28.1 (31 Aug 2023 11:43)    ADVANCED DIRECTIVES:     DNR/DNI     No documented HCP form found on [facility]     No Living will / POA / Advance directives found on [place] / Alpha.     [facility] notes on [facility]    DECISION MAKER: The patient is able to participate in symptomatic assessment but may not be able to make complex medical decisions  LEGAL SURROGATE: No documented HCP in paper chart / [place] / Alpha.  Alternate surrogate:   Wife/Caregiver: Shilpi Caldera  Phone: [phone]    Granddaughter/Emergency Contact (lives in [place]): Nanette Ariasuez  Phone: [phone]    Son (lives in [place]): Carlos Caldera  Phone: [phone]    Brother (lives in [place]): Britton Caldera  Phone: [phone]    Granddaughter (lives in [place]): Agnieszka Cuadra  Phone: [phone]    GOALS OF CARE DISCUSSION:       Palliative care info/counseling provided	           Family meeting       Advanced Directives addressed please see Advance Care Planning Note	           See previous Palliative Medicine Note       Documentation of GOC: DNR/DNI	          REFERRALS:	        Palliative Med        Unit SW/Case Mgmt              Speech/Swallow       Patient/Family Support       Massage Therapy       Music Therapy       Pet Therapy       Hospice       Nutrition       Dietician       PT/OT

## 2023-08-31 NOTE — ED PROVIDER NOTE - CLINICAL SUMMARY MEDICAL DECISION MAKING FREE TEXT BOX
patient presents with end-of-life symptoms related to esophageal cancer patient significantly stridorous.  Supporting patient with BiPAP ventilation albuterol Atrovent nebs patient is noted with decreasing oxygen level.  at 12:29 PM still noted to have increased work of breathing given Versed 2 mg morphine 2 mg IV.  Consulted with palliative care team which is by the bedside and agree with management.   Plan discussed with niece who agrees with the current management states that her uncle reiterated this morning that he is "ready". patient presents with end-of-life symptoms related to esophageal cancer patient significantly stridorous.  Supporting patient with BiPAP ventilation albuterol Atrovent nebs patient is noted with decreasing oxygen level.  at 12:29 PM still noted to have increased work of breathing given Versed 2 mg morphine 2 mg IV.  Consulted with palliative care team which is by the bedside and agree with management.   Plan discussed with niece who agrees with the current management states that her uncle reiterated this morning that he is "ready". Patient will require palliative care admission.

## 2023-08-31 NOTE — ED ADULT NURSE REASSESSMENT NOTE - NS ED NURSE REASSESS COMMENT FT1
pt safely moved to assigned floor bed at 7WO  Inpatient provider and team made aware of pt's status.

## 2023-08-31 NOTE — ED PROVIDER NOTE - OBJECTIVE STATEMENT
89 yo M PM esophageal cancer, aspirations and vocal cord paralysis who was recently discharged from in hospital hospice to [facility] presents to the ED for 1 day of worsening stridor. Evaluated by ENT, found to have esophageal mass/cancer with R vocal cord paralysis, pt was intubated during his last visit.  This AM pt was noted to have worsening stridor and team at [facility] could not make him comfortable. On route to ED by EMS pt received Morphine  2mg IM, morphine 4 mg SQ, Dex 10 mg, Versed 1 mg. Pt still in distress with increase work of breathing upon arrival to the ED. Patient presents with a MOLST form.  Discussed MOST form with patient's niece who was present by the bedside.  Patient is DNR no intubation.  Comfort measures only.

## 2023-08-31 NOTE — ED CLERICAL - NS ED CLERK NOTE PRE-ARRIVAL INFORMATION; ADDITIONAL PRE-ARRIVAL INFORMATION
This patient is enrolled in the COPD/PNA STARs readmission reduction program and has active care navigation. This patient can be followed up by the care navigation team within 24 hours. To arrange close follow-up or to obtain additional clinical information about this patient, please call the contact number above and please contact the on call pulmonary fellow (Long range pager is [phone]). Please also alert either Dr. Lowery or the hospitalist on call [phone] PRIOR to admission. Dr. Lowery can be reached at [phone].

## 2023-08-31 NOTE — CONSULT NOTE ADULT - NS ATTEST RISK PROBLEM GEN_ALL_CORE FT
Actively Dying  Acute Illness That Poses A Threat To Life  Prescription Of Parenteral Controlled Substances

## 2023-08-31 NOTE — H&P ADULT - ASSESSMENT
89 yo M [facility] esophageal cancer, aspirations and vocal cord paralysis who was recently discharged from in hospital hospice to [facility] presents to the ED for 1 day of worsening stridor, designated as DNR/DNI with comfort measures only, admitted for symptom directed care.

## 2023-09-01 NOTE — DISCHARGE NOTE FOR THE EXPIRED PATIENT - HOSPITAL COURSE
89 yo M PM esophageal cancer, aspirations and vocal cord paralysis who was recently discharged from in hospital hospice to [facility] presents to the ED for 1 day of worsening stridor. Evaluated by ENT, found to have esophageal mass/cancer with R vocal cord paralysis, pt was intubated during his last visit. On arrival pt was noted to have worsening stridor and team at [facility] could not make him comfortable. On route to ED by EMS, pt received Morphine 2mg IM, morphine 4 mg SQ, Dex 10 mg, Versed 1 mg. Pt still in distress with increase work of breathing upon arrival to the ED. Patient presents with a MOLST form.  Discussed MOST form with patient's niece who was present by the bedside.  Patient is DNR no intubation.  Placed on BIPAP while waiting family members to arrive, then removed 8/31 evening. Pronounced 9/1 at 11:20am.

## 2023-09-01 NOTE — HISTORY OF PRESENT ILLNESS
[FreeTextEntry1] : Mr. Frances is a 89 yo M with hx of intracranial aneurysm with newly diagnosed L supraclavicular node positive for malignancy and suspicious esophageal mass, cTxN2, Stage III. He was admitted to [facility] on 8/17/2023 for hypoxic respiratory failure and was found to have carcinoma s/p L supraclavicular node FNA on 8/22/2023. Radiation Oncology consulted during admission and patient was discharged on 8/24/2023 with plan for home hospice care.   9/1/2023: Consultation  Oncologic History:  8/22/2023 FNA of LEFT SUPRACLAVICULAR lymph node was positive for malignant cells, favor squamous cell carcinoma. Pembrolizumab Combined Positive Score: 1 (CPS<10 / No PD-L1 Expression)

## 2023-09-05 DIAGNOSIS — Z51.5 ENCOUNTER FOR PALLIATIVE CARE: ICD-10-CM

## 2023-09-05 DIAGNOSIS — J38.01 PARALYSIS OF VOCAL CORDS AND LARYNX, UNILATERAL: ICD-10-CM

## 2023-09-05 DIAGNOSIS — R53.2 FUNCTIONAL QUADRIPLEGIA: ICD-10-CM

## 2023-09-05 DIAGNOSIS — Z66 DO NOT RESUSCITATE: ICD-10-CM

## 2023-09-05 DIAGNOSIS — I10 ESSENTIAL (PRIMARY) HYPERTENSION: ICD-10-CM

## 2023-09-05 DIAGNOSIS — J96.01 ACUTE RESPIRATORY FAILURE WITH HYPOXIA: ICD-10-CM

## 2023-09-05 DIAGNOSIS — C15.9 MALIGNANT NEOPLASM OF ESOPHAGUS, UNSPECIFIED: ICD-10-CM

## 2023-09-05 NOTE — HISTORY OF PRESENT ILLNESS
[de-identified] : RAMANDEEP CALDERA is a 89 year old male here for hospital follow up of esophagus mass with lymphadenopathy, r/o malignancy. Here today for f/u.   Oncology history: -he was initially seen by Dr. Mcguire on 7/7/23 with 3-month history of hoarseness and dysphagia and plan was to have MRI brain, neck, and barium swallow. -presented to [facility] ED on 8/8/23 with hoarseness and worsening dysphagia x 5 days, he was unable to keep anything po down at that time.  -CT chest on 8/8/23 showed mass in the upper thoracic esophagus suspicious for neoplasm, 3 x 2.5 cm left supraclavicular lymphadenopathy. Left paratracheal lymph node measuring 1 cm in short axis. 2.5 x 2.2 cm retrocrural lymphadenopathy. Mildly enlarged bilateral hilar lymph nodes, nonspecific right adrenal nodule. Questionable 1.4 cm hypodense lesion in the upper pole of the right kidney. Consider follow-up MRI. 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   PMH: HTN, intracranial aneurysm(2016), mixed hearing loss, arthritis PSH: cataract surgery, thyroid bx in 9/2022, benign as per pt FM: no family history of cancer SH : former heavy smoker(stopped 40 years ago), former alcohol use (stopped 15 years ago), lives with his wife in [place]  [de-identified] : He presents to clinic with his granddaughter. He has lost over 20 lbs in the past 2 years. He is able to eat soft foods and continues to have hoarseness, has labored breathing. Denies chest pain, nausea, vomiting, diarrhea, constipation, dizziness, headaches, or abdominal pain.  He is able to perform ADLs with some assistance.

## 2023-09-05 NOTE — PHYSICAL EXAM
[Ambulatory and capable of all self care but unable to carry out any work activities] : Status 2- Ambulatory and capable of all self care but unable to carry out any work activities. Up and about more than 50% of waking hours [Thin] : thin [Normal] : affect appropriate [de-identified] : no acute distress  [de-identified] : supple.  + LAD.  R side thyroid enlargement [de-identified] : + lymphadenopathy - large 3-4 cm node l lower cervical/supraclavicular region

## 2023-09-08 ENCOUNTER — APPOINTMENT (OUTPATIENT)
Dept: PALLIATIVE MEDICINE | Facility: CLINIC | Age: 88
End: 2023-09-08

## 2024-07-30 NOTE — PATIENT PROFILE ADULT - FALL HARM RISK - CONCLUSION
Fall Risk Initiate Treatment: .\\n\\nORAL\\n-doxycycline hyclate 100 mg capsule: Take one capsule by mouth twice daily for 10 days with food and full glass of water. Avoid taking with dairy.\\n\\n. Detail Level: Zone Render In Strict Bullet Format?: No

## 2024-11-05 NOTE — ED ADULT TRIAGE NOTE - CCCP TRG CHIEF CMPLNT
Called patient let them know that his MRI is approved and that he can call to get it scheduled.     Approval for MRI: of  hip   Approval Letter in Media   Approved Facility [facility]   Approval Dates: 11.05.24 to 01.05.25   Auth #: [id number]    shortness of breath